# Patient Record
Sex: FEMALE | Race: WHITE | Employment: OTHER | URBAN - METROPOLITAN AREA
[De-identification: names, ages, dates, MRNs, and addresses within clinical notes are randomized per-mention and may not be internally consistent; named-entity substitution may affect disease eponyms.]

---

## 2019-08-30 ENCOUNTER — OFFICE VISIT (OUTPATIENT)
Dept: FAMILY MEDICINE CLINIC | Age: 79
End: 2019-08-30
Payer: MEDICARE

## 2019-08-30 VITALS
DIASTOLIC BLOOD PRESSURE: 84 MMHG | RESPIRATION RATE: 18 BRPM | SYSTOLIC BLOOD PRESSURE: 132 MMHG | OXYGEN SATURATION: 93 % | HEART RATE: 97 BPM | WEIGHT: 196.6 LBS

## 2019-08-30 DIAGNOSIS — J44.1 COPD WITH ACUTE EXACERBATION (HCC): Primary | ICD-10-CM

## 2019-08-30 DIAGNOSIS — Z91.81 AT HIGH RISK FOR FALLS: ICD-10-CM

## 2019-08-30 PROCEDURE — 4040F PNEUMOC VAC/ADMIN/RCVD: CPT | Performed by: NURSE PRACTITIONER

## 2019-08-30 PROCEDURE — G8421 BMI NOT CALCULATED: HCPCS | Performed by: NURSE PRACTITIONER

## 2019-08-30 PROCEDURE — 99214 OFFICE O/P EST MOD 30 MIN: CPT | Performed by: NURSE PRACTITIONER

## 2019-08-30 PROCEDURE — 1123F ACP DISCUSS/DSCN MKR DOCD: CPT | Performed by: NURSE PRACTITIONER

## 2019-08-30 PROCEDURE — 3023F SPIROM DOC REV: CPT | Performed by: NURSE PRACTITIONER

## 2019-08-30 PROCEDURE — 4004F PT TOBACCO SCREEN RCVD TLK: CPT | Performed by: NURSE PRACTITIONER

## 2019-08-30 PROCEDURE — G8926 SPIRO NO PERF OR DOC: HCPCS | Performed by: NURSE PRACTITIONER

## 2019-08-30 PROCEDURE — G8400 PT W/DXA NO RESULTS DOC: HCPCS | Performed by: NURSE PRACTITIONER

## 2019-08-30 PROCEDURE — 1090F PRES/ABSN URINE INCON ASSESS: CPT | Performed by: NURSE PRACTITIONER

## 2019-08-30 PROCEDURE — 94640 AIRWAY INHALATION TREATMENT: CPT | Performed by: NURSE PRACTITIONER

## 2019-08-30 PROCEDURE — G8427 DOCREV CUR MEDS BY ELIG CLIN: HCPCS | Performed by: NURSE PRACTITIONER

## 2019-08-30 RX ORDER — BUPROPION HYDROCHLORIDE 150 MG/1
150 TABLET ORAL 2 TIMES DAILY
COMMUNITY
End: 2021-09-01

## 2019-08-30 RX ORDER — LEVOFLOXACIN 750 MG/1
750 TABLET ORAL DAILY
Qty: 7 TABLET | Refills: 0 | Status: ON HOLD | OUTPATIENT
Start: 2019-08-30 | End: 2019-09-04 | Stop reason: HOSPADM

## 2019-08-30 RX ORDER — PREDNISONE 20 MG/1
20 TABLET ORAL 2 TIMES DAILY
Qty: 10 TABLET | Refills: 0 | Status: ON HOLD | OUTPATIENT
Start: 2019-08-30 | End: 2019-09-04 | Stop reason: HOSPADM

## 2019-08-30 RX ORDER — BENZONATATE 100 MG/1
100-200 CAPSULE ORAL 3 TIMES DAILY PRN
Qty: 21 CAPSULE | Refills: 0 | Status: SHIPPED | OUTPATIENT
Start: 2019-08-30 | End: 2019-09-06

## 2019-08-30 RX ORDER — OMEPRAZOLE 20 MG/1
CAPSULE, DELAYED RELEASE ORAL
Refills: 1 | COMMUNITY
Start: 2019-07-18 | End: 2021-09-01

## 2019-08-30 RX ORDER — POTASSIUM CHLORIDE 20 MEQ/1
TABLET, EXTENDED RELEASE ORAL
Refills: 0 | Status: ON HOLD | COMMUNITY
Start: 2019-08-15 | End: 2019-09-04 | Stop reason: HOSPADM

## 2019-08-30 RX ORDER — FUROSEMIDE 20 MG/1
TABLET ORAL
Refills: 0 | COMMUNITY
Start: 2019-08-15 | End: 2021-09-01

## 2019-08-30 RX ORDER — ASPIRIN 325 MG
81 TABLET ORAL DAILY
COMMUNITY
End: 2022-08-16

## 2019-08-30 RX ORDER — IPRATROPIUM BROMIDE AND ALBUTEROL SULFATE 2.5; .5 MG/3ML; MG/3ML
1 SOLUTION RESPIRATORY (INHALATION) ONCE
Status: COMPLETED | OUTPATIENT
Start: 2019-08-30 | End: 2019-08-30

## 2019-08-30 RX ORDER — LORATADINE 10 MG/1
TABLET ORAL
Refills: 1 | COMMUNITY
Start: 2019-08-15 | End: 2021-09-01

## 2019-08-30 RX ORDER — IPRATROPIUM BROMIDE AND ALBUTEROL SULFATE 2.5; .5 MG/3ML; MG/3ML
SOLUTION RESPIRATORY (INHALATION)
Refills: 1 | COMMUNITY
Start: 2019-08-15 | End: 2021-09-01

## 2019-08-30 RX ADMIN — IPRATROPIUM BROMIDE AND ALBUTEROL SULFATE 1 AMPULE: 2.5; .5 SOLUTION RESPIRATORY (INHALATION) at 13:00

## 2019-08-30 ASSESSMENT — ENCOUNTER SYMPTOMS
RHINORRHEA: 0
WHEEZING: 1
HEARTBURN: 0
TROUBLE SWALLOWING: 0
FREQUENT THROAT CLEARING: 1
CHEST TIGHTNESS: 0
COUGH: 1
SPUTUM PRODUCTION: 1
SORE THROAT: 0
SINUS PRESSURE: 0
SINUS PAIN: 0
HOARSE VOICE: 0
SHORTNESS OF BREATH: 1
DIFFICULTY BREATHING: 0
HEMOPTYSIS: 0

## 2019-08-30 ASSESSMENT — COPD QUESTIONNAIRES: COPD: 1

## 2019-08-30 NOTE — PATIENT INSTRUCTIONS
includes exercise programs, education about your disease and how to manage it, help with diet and other changes, and emotional support. Diet    · Eat regular, healthy meals. Use bronchodilators about 1 hour before you eat to make it easier to eat. Eat several small meals instead of three large ones. Drink beverages at the end of the meal. Avoid foods that are hard to chew.     · Eat foods that contain protein so that you do not lose muscle mass.     · Talk with your doctor if you gain too much weight or if you lose weight without trying.    Mental health    · Talk to your family, friends, or a therapist about your feelings. It is normal to feel frightened, angry, hopeless, helpless, and even guilty. Talking openly about bad feelings can help you cope. If these feelings last, talk to your doctor. When should you call for help? Call 911 anytime you think you may need emergency care. For example, call if:    · You have severe trouble breathing.    Call your doctor now or seek immediate medical care if:    · You have new or worse trouble breathing.     · You cough up blood.     · You have a fever.    Watch closely for changes in your health, and be sure to contact your doctor if:    · You cough more deeply or more often, especially if you notice more mucus or a change in the color of your mucus.     · You have new or worse swelling in your legs or belly.     · You are not getting better as expected. Where can you learn more? Go to [email or web address]://[email or web address]. org and sign in to your [employer] account. Enter P488 in the [facility] box to learn more about \"Chronic Obstructive Pulmonary Disease (COPD): Care Instructions. \"     If you do not have an account, please click on the \"Sign Up Now\" link. Current as of: September 5, 2018  Content Version: 12.1  © 4985-7734 Healthwise, Incorporated. Care instructions adapted under license by [facility] ([facility]).  If you have questions about a medical condition or

## 2019-08-30 NOTE — PROGRESS NOTES
Chief Complaint   Patient presents with    Cough     x2 days; productive; yellow       HPI:     Joseph Omer is a 70 y.o. female who presents today for complaints of increased SOB, wheezing and a productive cough (yellow sputum) for 2-3 days. She is here in [facility] visiting her daughter, she is from [facility] and family drove her here by car. She states she gets sick every time she comes here and had pneumonia her last visit a year ago. In addition, her daughter is ill and she is trying to take care of her. COPD   She complains of cough, frequent throat clearing, shortness of breath, sputum production and wheezing. There is no chest tightness, difficulty breathing, hemoptysis or hoarse voice. This is a new problem. The current episode started in the past 7 days (in the past 2-3 days). The problem occurs constantly. The problem has been gradually worsening. The cough is productive of purulent sputum. Associated symptoms include dyspnea on exertion and malaise/fatigue. Pertinent negatives include no appetite change, chest pain, ear congestion, ear pain, fever, headaches, heartburn, myalgias, nasal congestion, orthopnea, PND, postnasal drip, rhinorrhea, sneezing, sore throat, sweats, trouble swallowing or weight loss. Her symptoms are aggravated by lying down and strenuous activity. Her symptoms are alleviated by ipratropium. She reports minimal improvement on treatment. Her symptoms are not alleviated by rest and steroid inhaler. Risk factors for lung disease include smoking/tobacco exposure. Her past medical history is significant for COPD, emphysema and pneumonia. There is no history of asthma, bronchiectasis or bronchitis. Subjective:     Review of Systems   Constitutional: Positive for fatigue and malaise/fatigue. Negative for appetite change, chills, fever and weight loss.    HENT: Negative for congestion, ear pain, hoarse voice, postnasal drip, rhinorrhea, sinus pressure, sinus pain,

## 2019-09-02 ENCOUNTER — APPOINTMENT (OUTPATIENT)
Dept: CT IMAGING | Age: 79
DRG: 190 | End: 2019-09-02
Payer: MEDICARE

## 2019-09-02 ENCOUNTER — HOSPITAL ENCOUNTER (INPATIENT)
Age: 79
LOS: 2 days | Discharge: HOME | DRG: 190 | End: 2019-09-04
Attending: EMERGENCY MEDICINE | Admitting: INTERNAL MEDICINE
Payer: MEDICARE

## 2019-09-02 ENCOUNTER — APPOINTMENT (OUTPATIENT)
Dept: ULTRASOUND IMAGING | Age: 79
DRG: 190 | End: 2019-09-02
Payer: MEDICARE

## 2019-09-02 ENCOUNTER — APPOINTMENT (OUTPATIENT)
Dept: GENERAL RADIOLOGY | Age: 79
DRG: 190 | End: 2019-09-02
Payer: MEDICARE

## 2019-09-02 DIAGNOSIS — R06.00 DYSPNEA AND RESPIRATORY ABNORMALITIES: Primary | ICD-10-CM

## 2019-09-02 DIAGNOSIS — J44.1 COPD EXACERBATION (HCC): ICD-10-CM

## 2019-09-02 DIAGNOSIS — R06.89 DYSPNEA AND RESPIRATORY ABNORMALITIES: Primary | ICD-10-CM

## 2019-09-02 LAB
ALBUMIN SERPL-MCNC: 4.5 GM/DL (ref 3.4–5)
ALP BLD-CCNC: 73 IU/L (ref 40–128)
ALT SERPL-CCNC: 22 U/L (ref 10–40)
ANION GAP SERPL CALCULATED.3IONS-SCNC: 13 MMOL/L (ref 4–16)
APTT: 28.1 SECONDS (ref 21.2–33)
AST SERPL-CCNC: 31 IU/L (ref 15–37)
ATYPICAL LYMPHOCYTE ABSOLUTE COUNT: ABNORMAL
BANDED NEUTROPHILS ABSOLUTE COUNT: 0.41 K/CU MM
BANDED NEUTROPHILS RELATIVE PERCENT: 4 % (ref 5–11)
BILIRUB SERPL-MCNC: 0.2 MG/DL (ref 0–1)
BUN BLDV-MCNC: 25 MG/DL (ref 6–23)
CALCIUM SERPL-MCNC: 9.4 MG/DL (ref 8.3–10.6)
CHLORIDE BLD-SCNC: 102 MMOL/L (ref 99–110)
CO2: 23 MMOL/L (ref 21–32)
CREAT SERPL-MCNC: 1.4 MG/DL (ref 0.6–1.1)
D DIMER: <200 NG/ML(DDU)
DIFFERENTIAL TYPE: ABNORMAL
GFR AFRICAN AMERICAN: 44 ML/MIN/1.73M2
GFR NON-AFRICAN AMERICAN: 36 ML/MIN/1.73M2
GLUCOSE BLD-MCNC: 172 MG/DL (ref 70–99)
HCT VFR BLD CALC: 45.1 % (ref 37–47)
HEMOGLOBIN: 14.4 GM/DL (ref 12.5–16)
INR BLD: 0.94 INDEX
LYMPHOCYTES ABSOLUTE: 0.8 K/CU MM
LYMPHOCYTES RELATIVE PERCENT: 8 % (ref 24–44)
MCH RBC QN AUTO: 30.2 PG (ref 27–31)
MCHC RBC AUTO-ENTMCNC: 31.9 % (ref 32–36)
MCV RBC AUTO: 94.5 FL (ref 78–100)
METAMYELOCYTES ABSOLUTE COUNT: 0.2 K/CU MM
METAMYELOCYTES PERCENT: 2 %
MONOCYTES ABSOLUTE: 2.1 K/CU MM
MONOCYTES RELATIVE PERCENT: 21 % (ref 0–4)
PDW BLD-RTO: 13.7 % (ref 11.7–14.9)
PLATELET # BLD: 136 K/CU MM (ref 140–440)
PMV BLD AUTO: 10.5 FL (ref 7.5–11.1)
POTASSIUM SERPL-SCNC: 4.7 MMOL/L (ref 3.5–5.1)
PRO-BNP: 291 PG/ML
PROTHROMBIN TIME: 10.9 SECONDS (ref 9.12–12.5)
RBC # BLD: 4.77 M/CU MM (ref 4.2–5.4)
SEGMENTED NEUTROPHILS ABSOLUTE COUNT: 6.7 K/CU MM
SEGMENTED NEUTROPHILS RELATIVE PERCENT: 65 % (ref 36–66)
SODIUM BLD-SCNC: 138 MMOL/L (ref 135–145)
T4 FREE: 1.17 NG/DL (ref 0.9–1.8)
TOTAL PROTEIN: 7.3 GM/DL (ref 6.4–8.2)
TROPONIN T: <0.01 NG/ML
TSH HIGH SENSITIVITY: 0.21 UIU/ML (ref 0.27–4.2)
WBC # BLD: 10.2 K/CU MM (ref 4–10.5)
WBC # BLD: ABNORMAL 10*3/UL

## 2019-09-02 PROCEDURE — 85027 COMPLETE CBC AUTOMATED: CPT

## 2019-09-02 PROCEDURE — 83880 ASSAY OF NATRIURETIC PEPTIDE: CPT

## 2019-09-02 PROCEDURE — 6360000002 HC RX W HCPCS: Performed by: INTERNAL MEDICINE

## 2019-09-02 PROCEDURE — 80053 COMPREHEN METABOLIC PANEL: CPT

## 2019-09-02 PROCEDURE — 85007 BL SMEAR W/DIFF WBC COUNT: CPT

## 2019-09-02 PROCEDURE — 84439 ASSAY OF FREE THYROXINE: CPT

## 2019-09-02 PROCEDURE — 36415 COLL VENOUS BLD VENIPUNCTURE: CPT

## 2019-09-02 PROCEDURE — 87633 RESP VIRUS 12-25 TARGETS: CPT

## 2019-09-02 PROCEDURE — 2580000003 HC RX 258: Performed by: INTERNAL MEDICINE

## 2019-09-02 PROCEDURE — 87798 DETECT AGENT NOS DNA AMP: CPT

## 2019-09-02 PROCEDURE — 6370000000 HC RX 637 (ALT 250 FOR IP): Performed by: INTERNAL MEDICINE

## 2019-09-02 PROCEDURE — 93010 ELECTROCARDIOGRAM REPORT: CPT | Performed by: INTERNAL MEDICINE

## 2019-09-02 PROCEDURE — 85610 PROTHROMBIN TIME: CPT

## 2019-09-02 PROCEDURE — 87486 CHLMYD PNEUM DNA AMP PROBE: CPT

## 2019-09-02 PROCEDURE — 71250 CT THORAX DX C-: CPT

## 2019-09-02 PROCEDURE — 94761 N-INVAS EAR/PLS OXIMETRY MLT: CPT

## 2019-09-02 PROCEDURE — 93005 ELECTROCARDIOGRAM TRACING: CPT | Performed by: PHYSICIAN ASSISTANT

## 2019-09-02 PROCEDURE — 2140000000 HC CCU INTERMEDIATE R&B

## 2019-09-02 PROCEDURE — 87581 M.PNEUMON DNA AMP PROBE: CPT

## 2019-09-02 PROCEDURE — 93970 EXTREMITY STUDY: CPT

## 2019-09-02 PROCEDURE — 85379 FIBRIN DEGRADATION QUANT: CPT

## 2019-09-02 PROCEDURE — 99285 EMERGENCY DEPT VISIT HI MDM: CPT

## 2019-09-02 PROCEDURE — 71045 X-RAY EXAM CHEST 1 VIEW: CPT

## 2019-09-02 PROCEDURE — 84443 ASSAY THYROID STIM HORMONE: CPT

## 2019-09-02 PROCEDURE — 6370000000 HC RX 637 (ALT 250 FOR IP): Performed by: PHYSICIAN ASSISTANT

## 2019-09-02 PROCEDURE — 84484 ASSAY OF TROPONIN QUANT: CPT

## 2019-09-02 PROCEDURE — 85730 THROMBOPLASTIN TIME PARTIAL: CPT

## 2019-09-02 PROCEDURE — 94640 AIRWAY INHALATION TREATMENT: CPT

## 2019-09-02 PROCEDURE — 94664 DEMO&/EVAL PT USE INHALER: CPT

## 2019-09-02 RX ORDER — POTASSIUM CHLORIDE 20 MEQ/1
10 TABLET, EXTENDED RELEASE ORAL
Status: DISCONTINUED | OUTPATIENT
Start: 2019-09-02 | End: 2019-09-03

## 2019-09-02 RX ORDER — IPRATROPIUM BROMIDE AND ALBUTEROL SULFATE 2.5; .5 MG/3ML; MG/3ML
3 SOLUTION RESPIRATORY (INHALATION) ONCE
Status: COMPLETED | OUTPATIENT
Start: 2019-09-02 | End: 2019-09-02

## 2019-09-02 RX ORDER — BUPROPION HYDROCHLORIDE 150 MG/1
150 TABLET ORAL 2 TIMES DAILY
Status: DISCONTINUED | OUTPATIENT
Start: 2019-09-02 | End: 2019-09-04 | Stop reason: HOSPADM

## 2019-09-02 RX ORDER — IPRATROPIUM BROMIDE AND ALBUTEROL SULFATE 2.5; .5 MG/3ML; MG/3ML
1 SOLUTION RESPIRATORY (INHALATION) EVERY 4 HOURS PRN
Status: DISCONTINUED | OUTPATIENT
Start: 2019-09-02 | End: 2019-09-04 | Stop reason: HOSPADM

## 2019-09-02 RX ORDER — PREDNISONE 20 MG/1
60 TABLET ORAL ONCE
Status: DISCONTINUED | OUTPATIENT
Start: 2019-09-02 | End: 2019-09-02

## 2019-09-02 RX ORDER — FUROSEMIDE 10 MG/ML
40 INJECTION INTRAMUSCULAR; INTRAVENOUS 2 TIMES DAILY
Status: DISCONTINUED | OUTPATIENT
Start: 2019-09-02 | End: 2019-09-02

## 2019-09-02 RX ORDER — METHYLPREDNISOLONE SODIUM SUCCINATE 125 MG/2ML
40 INJECTION, POWDER, LYOPHILIZED, FOR SOLUTION INTRAMUSCULAR; INTRAVENOUS 4 TIMES DAILY
Status: DISCONTINUED | OUTPATIENT
Start: 2019-09-02 | End: 2019-09-03

## 2019-09-02 RX ORDER — FUROSEMIDE 20 MG/1
20 TABLET ORAL DAILY
Status: DISCONTINUED | OUTPATIENT
Start: 2019-09-02 | End: 2019-09-02

## 2019-09-02 RX ORDER — ASPIRIN 81 MG/1
81 TABLET ORAL DAILY
Status: DISCONTINUED | OUTPATIENT
Start: 2019-09-02 | End: 2019-09-04 | Stop reason: HOSPADM

## 2019-09-02 RX ORDER — IPRATROPIUM BROMIDE AND ALBUTEROL SULFATE 2.5; .5 MG/3ML; MG/3ML
1 SOLUTION RESPIRATORY (INHALATION) 4 TIMES DAILY
Status: DISCONTINUED | OUTPATIENT
Start: 2019-09-02 | End: 2019-09-04 | Stop reason: HOSPADM

## 2019-09-02 RX ADMIN — ENOXAPARIN SODIUM 40 MG: 40 INJECTION SUBCUTANEOUS at 08:48

## 2019-09-02 RX ADMIN — IPRATROPIUM BROMIDE AND ALBUTEROL SULFATE 1 AMPULE: .5; 3 SOLUTION RESPIRATORY (INHALATION) at 21:48

## 2019-09-02 RX ADMIN — METHYLPREDNISOLONE SODIUM SUCCINATE 40 MG: 125 INJECTION, POWDER, LYOPHILIZED, FOR SOLUTION INTRAMUSCULAR; INTRAVENOUS at 08:48

## 2019-09-02 RX ADMIN — POTASSIUM CHLORIDE 10 MEQ: 20 TABLET, EXTENDED RELEASE ORAL at 08:48

## 2019-09-02 RX ADMIN — IPRATROPIUM BROMIDE AND ALBUTEROL SULFATE 1 AMPULE: .5; 3 SOLUTION RESPIRATORY (INHALATION) at 16:09

## 2019-09-02 RX ADMIN — ASPIRIN 81 MG: 81 TABLET, COATED ORAL at 08:47

## 2019-09-02 RX ADMIN — BUPROPION HYDROCHLORIDE 150 MG: 150 TABLET, FILM COATED, EXTENDED RELEASE ORAL at 22:35

## 2019-09-02 RX ADMIN — IPRATROPIUM BROMIDE AND ALBUTEROL SULFATE 3 AMPULE: .5; 3 SOLUTION RESPIRATORY (INHALATION) at 03:00

## 2019-09-02 RX ADMIN — IPRATROPIUM BROMIDE AND ALBUTEROL SULFATE 1 AMPULE: .5; 3 SOLUTION RESPIRATORY (INHALATION) at 10:52

## 2019-09-02 RX ADMIN — AZITHROMYCIN MONOHYDRATE 500 MG: 500 INJECTION, POWDER, LYOPHILIZED, FOR SOLUTION INTRAVENOUS at 10:20

## 2019-09-02 RX ADMIN — METHYLPREDNISOLONE SODIUM SUCCINATE 40 MG: 125 INJECTION, POWDER, LYOPHILIZED, FOR SOLUTION INTRAMUSCULAR; INTRAVENOUS at 22:36

## 2019-09-02 RX ADMIN — BUPROPION HYDROCHLORIDE 150 MG: 150 TABLET, FILM COATED, EXTENDED RELEASE ORAL at 08:48

## 2019-09-02 RX ADMIN — FUROSEMIDE 20 MG: 20 TABLET ORAL at 08:47

## 2019-09-02 SDOH — HEALTH STABILITY: MENTAL HEALTH: HOW OFTEN DO YOU HAVE A DRINK CONTAINING ALCOHOL?: NEVER

## 2019-09-02 ASSESSMENT — PAIN SCALES - GENERAL
PAINLEVEL_OUTOF10: 0

## 2019-09-02 NOTE — H&P
HISTORY AND PHYSICAL  (Hospitalist, Internal Medicine)  IDENTIFYING INFORMATION   PATIENT:  Viral De Luna  MRN:  [id number]  ADMIT DATE: 9/2/2019  TIME OF EVALUATION: 9/2/2019 4:23 AM    CHIEF COMPLAINT   SOB    HISTORY OF PRESENT ILLNESS   Viral De Luna is a 70 y.o. female with a past medical history of COPD , smokes 1 PPD and has been smoking until day of visit today reports persistent SOB, worse on exertion since last week. Visited urgent care on Friday and was sent home on PO levaquin, steroids and had been taking these medicines without much improvement. Patient has recently relocated from [address] [place] [address] to [place] so as to be close to her children. On review, she reports progressive LE edema and significant weight gain in the past 6-12 months and has been placed on daily lasix for the last 4 months      PMH listed below:    PAST MEDICAL, SURGICAL, FAMILY, and SOCIAL HISTORY     Past Medical History:   Diagnosis Date    COPD (chronic obstructive pulmonary disease) ([facility] [address]      History reviewed. No pertinent surgical history. History reviewed. No pertinent family history. Family Hx of HTN  Family Hx as reviewed above, otherwise non-contributory  Social History     Socioeconomic History    Marital status:       Spouse name: None    Number of children: None    Years of education: None    Highest education level: None   Occupational History    None   Social Needs    Financial resource strain: None    Food insecurity:     Worry: None     Inability: None    Transportation needs:     Medical: None     Non-medical: None   Tobacco Use    Smoking status: Current Some Day Smoker     Packs/day: 0.50     Years: 25.00     Pack years: 12.50     Types: Cigarettes     Start date: 8/30/1984    Smokeless tobacco: Never Used   Substance and Sexual Activity    Alcohol use: Never     Frequency: Never    Drug use: Never    Sexual activity: None   Lifestyle    Physical activity:     Days per week: None     Minutes per

## 2019-09-02 NOTE — ED PROVIDER NOTES
Absolute 1+     WBC Morphology       REACTIVE LYMPHOCYTES NOTED  OCCASIONAL VACUOLES IN SEGMENTED NEUTROPHILS     CMP   Result Value Ref Range    Sodium 138 135 - 145 MMOL/L    Potassium 4.7 3.5 - 5.1 MMOL/L    Chloride 102 99 - 110 mMol/L    CO2 23 21 - 32 MMOL/L    BUN 25 (H) 6 - 23 MG/DL    CREATININE 1.4 (H) 0.6 - 1.1 MG/DL    Glucose 172 (H) 70 - 99 MG/DL    Calcium 9.4 8.3 - 10.6 MG/DL    Alb 4.5 3.4 - 5.0 GM/DL    Total Protein 7.3 6.4 - 8.2 GM/DL    Total Bilirubin 0.2 0.0 - 1.0 MG/DL    ALT 22 10 - 40 U/L    AST 31 15 - 37 IU/L    Alkaline Phosphatase 73 40 - 128 IU/L    GFR Non- 36 (L) >60 mL/min/1.73m2    GFR  44 (L) >60 mL/min/1.73m2    Anion Gap 13 4 - 16   Troponin   Result Value Ref Range    Troponin T <0.010 <0.01 NG/ML   Brain Natriuretic Peptide   Result Value Ref Range    Pro-.0 <300 PG/ML   TSH without Reflex   Result Value Ref Range    TSH, High Sensitivity 0.206 (L) 0.270 - 4.20 uIu/ml   T4, free   Result Value Ref Range    T4 Free 1.17 0.9 - 1.8 NG/DL   D-dimer, quantitative   Result Value Ref Range    D-Dimer, Quant <200 <230 NG/mL(DDU)   Protime/INR & PTT   Result Value Ref Range    Protime 10.9 9.12 - 12.5 SECONDS    INR 0.94 INDEX    aPTT 28.1 21.2 - 33.0 SECONDS   EKG 12 Lead   Result Value Ref Range    Ventricular Rate 104 BPM    Atrial Rate 104 BPM    P-R Interval 90 ms    QRS Duration 114 ms    Q-T Interval 370 ms    QTc Calculation (Bazett) 486 ms    P Axis 75 degrees    R Axis 50 degrees    T Axis 18 degrees    Diagnosis       Sinus tachycardia with short MD  Possible Left atrial enlargement  Low voltage QRS  Incomplete right bundle branch block  Nonspecific T wave abnormality  Abnormal ECG  No previous ECGs available  Confirmed by [facility] Mary ROY ([id number]) on 9/2/2019 8:55:27 PM             RADIOLOGY/PROCEDURES    CXR:    Ct Chest Wo Contrast    Result Date: 9/3/2019  EXAMINATION: CT OF THE CHEST WITHOUT CONTRAST 9/2/2019 9:28 pm TECHNIQUE: CT of the chest was performed without the administration of intravenous contrast. Multiplanar reformatted images are provided for review. Dose modulation, iterative reconstruction, and/or weight based adjustment of the mA/kV was utilized to reduce the radiation dose to as low as reasonably achievable. COMPARISON: None HISTORY: ORDERING SYSTEM PROVIDED HISTORY: dyspnea TECHNOLOGIST PROVIDED HISTORY: Reason for Exam: DYSPNEA Acuity: Acute Type of Exam: Initial Additional signs and symptoms: PT STATES SOB X 1 WEEK Relevant Medical/Surgical History: COPD FINDINGS: Mediastinum: Prominence of the intima of the aorta implies underlying anemia. Heart is normal in size. No pericardial effusion. Three-vessel coronary artery disease. No enlarged mediastinal lymph nodes. Lungs/pleura: Centrilobular emphysema. No pleural effusion or pneumothorax. No airspace consolidation. Scattered areas of atelectasis or scar. 2 mm right upper lobe pulmonary nodule (series 2, image 29). Upper Abdomen: The included portions of the liver and spleen are unremarkable. The included portions of the kidneys are unremarkable. Sliding-type hiatal hernia. Soft Tissues/Bones: No axillary or supraclavicular lymphadenopathy. The osseous structures are intact. No expansile or destructive lesions. COPD without airspace consolidation. 2 mm right upper lobe pulmonary nodule. Optional 1 year follow-up per Fleischner Society guidelines. RECOMMENDATIONS: A non-contrast Chest CT at 12 months is optional. If performed and the nodule is stable at 12 months, no further follow-up is recommended. *These guidelines do not apply to patients younger than 35 years, immunocompromised patients, and patients with cancer. Follow up in patients with significant comorbidities as clinically warranted. For lung cancer screening, adhere to Lung-RADS guidelines. Reference:  Radiology. 2017 Jul; 284(1):228-243.      Xr Chest Portable    Result Date: 9/2/2019  EXAMINATION: ONE XRAY VIEW OF THE CHEST 9/2/2019 2:18 am COMPARISON: None. HISTORY: ORDERING SYSTEM PROVIDED HISTORY: sob TECHNOLOGIST PROVIDED HISTORY: Reason for exam:->sob Reason for Exam: sob Acuity: Acute Type of Exam: Initial FINDINGS: The lungs are mildly hyperinflated. No focal consolidation, pleural effusion or pneumothorax. Upper limit of normal size of the cardiac silhouette. There are atherosclerotic calcifications at the aortic arch. No acute bony abnormality. 1. Hyperinflated lungs. 2. No acute cardiopulmonary abnormality. 3. Upper limit of normal size of the cardiac silhouette. Vl Dup Lower Extremity Venous Bilateral    Result Date: 9/2/2019  EXAMINATION: DUPLEX VENOUS ULTRASOUND OF THE BILATERAL LOWER EXTREMITIES, 9/2/2019 5:28 pm TECHNIQUE: Duplex ultrasound and Doppler images were obtained of the bilateral lower extremities COMPARISON: None. HISTORY: ORDERING SYSTEM PROVIDED HISTORY: Leg swelling TECHNOLOGIST PROVIDED HISTORY: Reason for exam:->Leg swelling Reason for Exam: swelling Type of Exam: Initial FINDINGS: Evaluation of the calf bilaterally is limited due to significant soft tissue edema. Otherwise, the visualized veins of the thighs and popliteal veins bilaterally are patent and free of echogenic thrombus. Limited evaluation of the calves. Otherwise, no evidence of DVT in either lower extremity. ED COURSE & MEDICAL DECISION MAKING       Vital signs and nursing notes reviewed during ED course. Patient care and presentation staffed with supervising MD. Patient seen by supervising MD today- see his/her note for details of the encounter. All pertinent Lab data and radiographic results reviewed with patient at bedside. The patient and/or the family were informed of the results of any tests/labs/imaging, the treatment plan, and time was allotted to answer questions.        Vitals:    09/02/19 0203   BP: (!) 169/65   Pulse: 102   Resp: 19   Temp: 98.1 °F (36.7 °C)   TempSrc:

## 2019-09-02 NOTE — FLOWSHEET NOTE
This nurse called Dr. Gayle Wright and asked about a diet order for pt.   Pt swallowed pills whole with little bit of water fine this morning

## 2019-09-03 LAB
ANION GAP SERPL CALCULATED.3IONS-SCNC: 13 MMOL/L (ref 4–16)
BUN BLDV-MCNC: 22 MG/DL (ref 6–23)
CALCIUM SERPL-MCNC: 9.6 MG/DL (ref 8.3–10.6)
CHLORIDE BLD-SCNC: 102 MMOL/L (ref 99–110)
CO2: 27 MMOL/L (ref 21–32)
CREAT SERPL-MCNC: 1.1 MG/DL (ref 0.6–1.1)
GFR AFRICAN AMERICAN: 58 ML/MIN/1.73M2
GFR NON-AFRICAN AMERICAN: 48 ML/MIN/1.73M2
GLUCOSE BLD-MCNC: 161 MG/DL (ref 70–99)
LV EF: 53 %
LVEF MODALITY: NORMAL
POTASSIUM SERPL-SCNC: 5.1 MMOL/L (ref 3.5–5.1)
SODIUM BLD-SCNC: 142 MMOL/L (ref 135–145)

## 2019-09-03 PROCEDURE — 93306 TTE W/DOPPLER COMPLETE: CPT

## 2019-09-03 PROCEDURE — 2580000003 HC RX 258: Performed by: PHYSICIAN ASSISTANT

## 2019-09-03 PROCEDURE — 6360000002 HC RX W HCPCS: Performed by: INTERNAL MEDICINE

## 2019-09-03 PROCEDURE — 2700000000 HC OXYGEN THERAPY PER DAY

## 2019-09-03 PROCEDURE — 94667 MNPJ CHEST WALL 1ST: CPT

## 2019-09-03 PROCEDURE — 2140000000 HC CCU INTERMEDIATE R&B

## 2019-09-03 PROCEDURE — 6370000000 HC RX 637 (ALT 250 FOR IP): Performed by: PHYSICIAN ASSISTANT

## 2019-09-03 PROCEDURE — 6370000000 HC RX 637 (ALT 250 FOR IP): Performed by: INTERNAL MEDICINE

## 2019-09-03 PROCEDURE — 94640 AIRWAY INHALATION TREATMENT: CPT

## 2019-09-03 PROCEDURE — 80048 BASIC METABOLIC PNL TOTAL CA: CPT

## 2019-09-03 PROCEDURE — 36415 COLL VENOUS BLD VENIPUNCTURE: CPT

## 2019-09-03 PROCEDURE — 94761 N-INVAS EAR/PLS OXIMETRY MLT: CPT

## 2019-09-03 PROCEDURE — 2580000003 HC RX 258: Performed by: INTERNAL MEDICINE

## 2019-09-03 PROCEDURE — 6360000002 HC RX W HCPCS: Performed by: PHYSICIAN ASSISTANT

## 2019-09-03 RX ORDER — GUAIFENESIN 600 MG/1
600 TABLET, EXTENDED RELEASE ORAL 2 TIMES DAILY
Status: DISCONTINUED | OUTPATIENT
Start: 2019-09-03 | End: 2019-09-04 | Stop reason: HOSPADM

## 2019-09-03 RX ORDER — LANOLIN ALCOHOL/MO/W.PET/CERES
3 CREAM (GRAM) TOPICAL NIGHTLY PRN
Status: DISCONTINUED | OUTPATIENT
Start: 2019-09-04 | End: 2019-09-04 | Stop reason: HOSPADM

## 2019-09-03 RX ORDER — AMLODIPINE BESYLATE 5 MG/1
5 TABLET ORAL DAILY
Status: DISCONTINUED | OUTPATIENT
Start: 2019-09-03 | End: 2019-09-04 | Stop reason: HOSPADM

## 2019-09-03 RX ORDER — BENZONATATE 100 MG/1
100 CAPSULE ORAL 3 TIMES DAILY PRN
Status: DISCONTINUED | OUTPATIENT
Start: 2019-09-03 | End: 2019-09-04 | Stop reason: HOSPADM

## 2019-09-03 RX ORDER — METHYLPREDNISOLONE SODIUM SUCCINATE 125 MG/2ML
40 INJECTION, POWDER, LYOPHILIZED, FOR SOLUTION INTRAMUSCULAR; INTRAVENOUS EVERY 12 HOURS
Status: DISCONTINUED | OUTPATIENT
Start: 2019-09-03 | End: 2019-09-04 | Stop reason: HOSPADM

## 2019-09-03 RX ORDER — NICOTINE 21 MG/24HR
1 PATCH, TRANSDERMAL 24 HOURS TRANSDERMAL DAILY
Status: DISCONTINUED | OUTPATIENT
Start: 2019-09-03 | End: 2019-09-04 | Stop reason: HOSPADM

## 2019-09-03 RX ADMIN — AMLODIPINE BESYLATE 5 MG: 5 TABLET ORAL at 09:01

## 2019-09-03 RX ADMIN — IPRATROPIUM BROMIDE AND ALBUTEROL SULFATE 1 AMPULE: .5; 3 SOLUTION RESPIRATORY (INHALATION) at 16:08

## 2019-09-03 RX ADMIN — AZITHROMYCIN MONOHYDRATE 500 MG: 500 INJECTION, POWDER, LYOPHILIZED, FOR SOLUTION INTRAVENOUS at 09:02

## 2019-09-03 RX ADMIN — ENOXAPARIN SODIUM 40 MG: 40 INJECTION SUBCUTANEOUS at 09:00

## 2019-09-03 RX ADMIN — GUAIFENESIN 600 MG: 600 TABLET, EXTENDED RELEASE ORAL at 23:05

## 2019-09-03 RX ADMIN — METHYLPREDNISOLONE SODIUM SUCCINATE 40 MG: 125 INJECTION, POWDER, LYOPHILIZED, FOR SOLUTION INTRAMUSCULAR; INTRAVENOUS at 09:01

## 2019-09-03 RX ADMIN — BUPROPION HYDROCHLORIDE 150 MG: 150 TABLET, FILM COATED, EXTENDED RELEASE ORAL at 09:01

## 2019-09-03 RX ADMIN — ASPIRIN 81 MG: 81 TABLET, COATED ORAL at 09:01

## 2019-09-03 RX ADMIN — BUPROPION HYDROCHLORIDE 150 MG: 150 TABLET, FILM COATED, EXTENDED RELEASE ORAL at 22:12

## 2019-09-03 RX ADMIN — METHYLPREDNISOLONE SODIUM SUCCINATE 40 MG: 125 INJECTION, POWDER, FOR SOLUTION INTRAMUSCULAR; INTRAVENOUS at 22:13

## 2019-09-03 RX ADMIN — IPRATROPIUM BROMIDE AND ALBUTEROL SULFATE 1 AMPULE: .5; 3 SOLUTION RESPIRATORY (INHALATION) at 12:25

## 2019-09-03 RX ADMIN — POTASSIUM CHLORIDE 10 MEQ: 20 TABLET, EXTENDED RELEASE ORAL at 09:01

## 2019-09-03 RX ADMIN — BENZONATATE 100 MG: 100 CAPSULE ORAL at 23:05

## 2019-09-03 RX ADMIN — HYDRALAZINE HYDROCHLORIDE 10 MG: 20 INJECTION INTRAMUSCULAR; INTRAVENOUS at 23:05

## 2019-09-03 RX ADMIN — IPRATROPIUM BROMIDE AND ALBUTEROL SULFATE 1 AMPULE: .5; 3 SOLUTION RESPIRATORY (INHALATION) at 20:32

## 2019-09-03 RX ADMIN — IPRATROPIUM BROMIDE AND ALBUTEROL SULFATE 1 AMPULE: .5; 3 SOLUTION RESPIRATORY (INHALATION) at 08:14

## 2019-09-03 ASSESSMENT — PAIN SCALES - GENERAL
PAINLEVEL_OUTOF10: 0

## 2019-09-03 NOTE — PROGRESS NOTES
Hospitalist Progress Note         Admit Date: 9/2/2019    PCP: No primary care provider on file. Chief Complaint   Patient presents with    Shortness of Breath        Assessment and Plan:      COPD exacerbation ([address] azithromycin, Solu-Medrol and nebulization. O2 support as needed.  Shortness of breath and leg swelling echo with grade 2 DD and normal EF. Stop Lasix as per cardio. DVT screen negative. Continue monitor for diuresis if needed. Check BMP and electrolytes   Tobacco abuse smoking cessation counseling and Nicoderm Patch. VTE prophylaxis LMWH. Current Facility-Administered Medications   Medication Dose Route Frequency Provider Last Rate Last Dose    amLODIPine (NORVASC) tablet 5 mg  5 mg Oral Daily Augustine Sommer MD   5 mg at 09/03/19 0901    nicotine (NICODERM CQ) 21 MG/24HR 1 patch  1 patch Transdermal Daily Augustine Sommer MD   1 patch at 09/03/19 0904    methylPREDNISolone sodium (SOLU-MEDROL) injection 40 mg  40 mg Intravenous Q12H Mercy Lara MD        enoxaparin (LOVENOX) injection 40 mg  40 mg Subcutaneous Daily Miriam Durant MD   40 mg at 09/03/19 0900    ipratropium-albuterol (DUONEB) nebulizer solution 1 ampule  1 ampule Inhalation 4x daily Miriam Durant MD   1 ampule at 09/03/19 1608    ipratropium-albuterol (DUONEB) nebulizer solution 1 ampule  1 ampule Inhalation Q4H PRN Miriam Durant MD        azithromycin (ZITHROMAX) 500 mg in dextrose 5 % 250 mL IVPB  500 mg Intravenous Q24H Miriam Durant MD   Stopped at 09/03/19 1002    aspirin EC tablet 81 mg  81 mg Oral Daily Miriam Durant MD   81 mg at 09/03/19 0901    buPROPion (WELLBUTRIN XL) extended release tablet 150 mg  150 mg Oral BID Miriam Durant MD   150 mg at 09/03/19 0901       Subjective:     Patient feels short of breath but getting better. Denied any chest pain.   Cough with scanty sputum production      Objective:   No intake or output data in the 24 hours ending 09/03/19 1639   Vitals:   Vitals:

## 2019-09-03 NOTE — CONSULTS
and troponins are  negative. LFTs are normal.  CBC is normal.    RADIOLOGY DATA:  Chest x-ray, hyperinflation present. Ultrasound of the  legs are pending at this time. IMPRESSION:  This is a 53-year-old female patient. I believe probably  severe advanced COPD present, comes to the hospital with having  shortness of breath present. Getting progressively short of breath, it  is more like a period of severe exacerbation. From a cardiac stand, we  will do cardiac workup on her. We  will get an echo and I will make  further recommendations.         Arielle Orozco MD    D: 09/02/2019 20:11:11       T: 09/02/2019 22:52:04     [email or web address]  Job#: [id number]     Doc#: [id number]    CC:

## 2019-09-04 VITALS
BODY MASS INDEX: 34.73 KG/M2 | HEART RATE: 88 BPM | WEIGHT: 196 LBS | OXYGEN SATURATION: 94 % | SYSTOLIC BLOOD PRESSURE: 138 MMHG | HEIGHT: 63 IN | RESPIRATION RATE: 19 BRPM | TEMPERATURE: 98 F | DIASTOLIC BLOOD PRESSURE: 52 MMHG

## 2019-09-04 LAB
BACTERIA: NEGATIVE /HPF
BILIRUBIN URINE: NEGATIVE MG/DL
BLOOD, URINE: ABNORMAL
CLARITY: CLEAR
COLOR: ABNORMAL
ESTIMATED AVERAGE GLUCOSE: 123 MG/DL
GLUCOSE, URINE: NEGATIVE MG/DL
HBA1C MFR BLD: 5.9 % (ref 4.2–6.3)
KETONES, URINE: NEGATIVE MG/DL
LEUKOCYTE ESTERASE, URINE: NEGATIVE
NITRITE URINE, QUANTITATIVE: NEGATIVE
PH, URINE: 6 (ref 5–8)
PRO-BNP: 404.5 PG/ML
PROTEIN UA: NEGATIVE MG/DL
RBC URINE: <1 /HPF (ref 0–6)
SPECIFIC GRAVITY UA: 1.01 (ref 1–1.03)
SQUAMOUS EPITHELIAL: <1 /HPF
T3 FREE: 1.5 PG/ML (ref 2.3–4.2)
TRICHOMONAS: ABNORMAL /HPF
TSH HIGH SENSITIVITY: 0.16 UIU/ML (ref 0.27–4.2)
UROBILINOGEN, URINE: NORMAL MG/DL (ref 0.2–1)
WBC UA: <1 /HPF (ref 0–5)

## 2019-09-04 PROCEDURE — 2580000003 HC RX 258: Performed by: INTERNAL MEDICINE

## 2019-09-04 PROCEDURE — G0009 ADMIN PNEUMOCOCCAL VACCINE: HCPCS | Performed by: INTERNAL MEDICINE

## 2019-09-04 PROCEDURE — 94640 AIRWAY INHALATION TREATMENT: CPT

## 2019-09-04 PROCEDURE — 6370000000 HC RX 637 (ALT 250 FOR IP): Performed by: PHYSICIAN ASSISTANT

## 2019-09-04 PROCEDURE — 6360000002 HC RX W HCPCS: Performed by: INTERNAL MEDICINE

## 2019-09-04 PROCEDURE — 6370000000 HC RX 637 (ALT 250 FOR IP): Performed by: INTERNAL MEDICINE

## 2019-09-04 PROCEDURE — 84443 ASSAY THYROID STIM HORMONE: CPT

## 2019-09-04 PROCEDURE — 90732 PPSV23 VACC 2 YRS+ SUBQ/IM: CPT | Performed by: INTERNAL MEDICINE

## 2019-09-04 PROCEDURE — 83036 HEMOGLOBIN GLYCOSYLATED A1C: CPT

## 2019-09-04 PROCEDURE — 84481 FREE ASSAY (FT-3): CPT

## 2019-09-04 PROCEDURE — 36415 COLL VENOUS BLD VENIPUNCTURE: CPT

## 2019-09-04 PROCEDURE — 83880 ASSAY OF NATRIURETIC PEPTIDE: CPT

## 2019-09-04 PROCEDURE — 81001 URINALYSIS AUTO W/SCOPE: CPT

## 2019-09-04 RX ORDER — AMLODIPINE BESYLATE 5 MG/1
5 TABLET ORAL DAILY
Qty: 30 TABLET | Refills: 3 | Status: SHIPPED | OUTPATIENT
Start: 2019-09-05 | End: 2021-09-01

## 2019-09-04 RX ORDER — FUROSEMIDE 20 MG/1
20 TABLET ORAL DAILY
Status: DISCONTINUED | OUTPATIENT
Start: 2019-09-04 | End: 2019-09-04 | Stop reason: HOSPADM

## 2019-09-04 RX ORDER — GUAIFENESIN 600 MG/1
600 TABLET, EXTENDED RELEASE ORAL 2 TIMES DAILY
Qty: 20 TABLET | Refills: 0 | Status: SHIPPED | OUTPATIENT
Start: 2019-09-04 | End: 2021-09-01

## 2019-09-04 RX ORDER — METHYLPREDNISOLONE 4 MG/1
TABLET ORAL
Qty: 1 KIT | Refills: 0 | Status: SHIPPED | OUTPATIENT
Start: 2019-09-04 | End: 2021-06-02 | Stop reason: ALTCHOICE

## 2019-09-04 RX ORDER — AZITHROMYCIN 500 MG/1
500 TABLET, FILM COATED ORAL DAILY
Qty: 3 TABLET | Refills: 0 | Status: SHIPPED | OUTPATIENT
Start: 2019-09-04 | End: 2019-09-07

## 2019-09-04 RX ADMIN — GUAIFENESIN 600 MG: 600 TABLET, EXTENDED RELEASE ORAL at 09:36

## 2019-09-04 RX ADMIN — METHYLPREDNISOLONE SODIUM SUCCINATE 40 MG: 125 INJECTION, POWDER, FOR SOLUTION INTRAMUSCULAR; INTRAVENOUS at 09:37

## 2019-09-04 RX ADMIN — AZITHROMYCIN MONOHYDRATE 500 MG: 500 INJECTION, POWDER, LYOPHILIZED, FOR SOLUTION INTRAVENOUS at 10:09

## 2019-09-04 RX ADMIN — IPRATROPIUM BROMIDE AND ALBUTEROL SULFATE 1 AMPULE: .5; 3 SOLUTION RESPIRATORY (INHALATION) at 08:24

## 2019-09-04 RX ADMIN — MELATONIN 3 MG: at 00:30

## 2019-09-04 RX ADMIN — FUROSEMIDE 20 MG: 20 TABLET ORAL at 09:36

## 2019-09-04 RX ADMIN — BUPROPION HYDROCHLORIDE 150 MG: 150 TABLET, FILM COATED, EXTENDED RELEASE ORAL at 09:36

## 2019-09-04 RX ADMIN — PNEUMOCOCCAL VACCINE POLYVALENT 0.5 ML
25; 25; 25; 25; 25; 25; 25; 25; 25; 25; 25; 25; 25; 25; 25; 25; 25; 25; 25; 25; 25; 25; 25 INJECTION, SOLUTION INTRAMUSCULAR; SUBCUTANEOUS at 14:29

## 2019-09-04 RX ADMIN — ENOXAPARIN SODIUM 40 MG: 40 INJECTION SUBCUTANEOUS at 09:36

## 2019-09-04 RX ADMIN — AMLODIPINE BESYLATE 5 MG: 5 TABLET ORAL at 09:36

## 2019-09-04 RX ADMIN — ASPIRIN 81 MG: 81 TABLET, COATED ORAL at 09:36

## 2019-09-04 ASSESSMENT — PAIN SCALES - GENERAL
PAINLEVEL_OUTOF10: 0
PAINLEVEL_OUTOF10: 0

## 2019-09-04 NOTE — CARE COORDINATION
Ambreen Bishop, Audrey's daughter called. She was in the patients room. She asked that I come up and go over with her what I went over with her mother. CN went to patient room. Audrey gave me permission to talk to her daughter and she also wanted to hear it again. Information given, questions answered and both voiced understanding. When I went to talk to Nola and her daughter. Audrey was not wearing oxygen and sating at 94% while talking to me. Gladis Quintero 09/04/19    Spoke with Audrey. She is selling her home and is going to be living with each of her Daughters a little at a time. One daughter lives here in [facility] and the other in [place]. She has changed her medical insurance so she can have a PCP here and in [place]. She has a nebulizer and IMDI at home. She is using oxygen while I am in the room with her. Spoke with Tigre Cantrell RN nurse taking care of Audrey, she will screen Audrey for possible home oxygen need. Noted she had a Acapella on her bedside table. We also discussed the incentive spirometer. She had one 6 years ago and was interested in using one again. CN gave Audrey a incentive spirometer. We reviewed both the Acapella and incentive spirometer use and benefits. We discussed the Pneumonia vaccines. Audrey had the Prevnar 13 vaccine 8-8-2007. She is interested in the Pneumococcal 23. Obtained a verbal order per Dr. Fiona Soler. Since Audrey's daughter Ambreen Bishop is trying to get patient set up with a PCP. I gave her a paper handout about the [facility] in Care. Audrey verbalized she went there last Friday when she was sick. Ambreen Bishop has set up a new patient appointment for Audrey with [address] [place] [address] 9-9-19 at 9:30 am.      Susannah Seal over when to use, how to use and what order to use a IMDI. Shake first, how to synchronize the inhalation with each puff, holding breath, waiting time between puffs, rinse mouth and how to make sure the canister is not empty.     Went over nebulizer

## 2019-09-10 LAB
EKG ATRIAL RATE: 104 BPM
EKG DIAGNOSIS: NORMAL
EKG P AXIS: 75 DEGREES
EKG P-R INTERVAL: 90 MS
EKG Q-T INTERVAL: 370 MS
EKG QRS DURATION: 114 MS
EKG QTC CALCULATION (BAZETT): 486 MS
EKG R AXIS: 50 DEGREES
EKG T AXIS: 18 DEGREES
EKG VENTRICULAR RATE: 104 BPM

## 2020-03-12 ENCOUNTER — HOSPITAL ENCOUNTER (OUTPATIENT)
Dept: CT IMAGING | Age: 80
Discharge: HOME | End: 2020-03-12
Payer: MEDICARE

## 2020-03-12 PROCEDURE — 71250 CT THORAX DX C-: CPT

## 2021-03-19 ENCOUNTER — HOSPITAL ENCOUNTER (OUTPATIENT)
Dept: CT IMAGING | Age: 81
Discharge: HOME | End: 2021-03-19
Payer: MEDICARE

## 2021-03-19 DIAGNOSIS — F17.210 CIGARETTE SMOKER: ICD-10-CM

## 2021-03-19 DIAGNOSIS — Z87.891 HISTORY OF TOBACCO USE: ICD-10-CM

## 2021-03-19 PROCEDURE — 71271 CT THORAX LUNG CANCER SCR C-: CPT

## 2021-04-21 ENCOUNTER — HOSPITAL ENCOUNTER (OUTPATIENT)
Age: 81
Discharge: HOME | End: 2021-04-21
Payer: MEDICARE

## 2021-04-21 ENCOUNTER — HOSPITAL ENCOUNTER (OUTPATIENT)
Dept: GENERAL RADIOLOGY | Age: 81
Discharge: HOME | End: 2021-04-21
Payer: MEDICARE

## 2021-04-21 DIAGNOSIS — M79.671 RIGHT FOOT PAIN: ICD-10-CM

## 2021-04-21 DIAGNOSIS — M79.672 LEFT FOOT PAIN: ICD-10-CM

## 2021-04-21 PROCEDURE — 73630 X-RAY EXAM OF FOOT: CPT

## 2021-04-27 ENCOUNTER — HOSPITAL ENCOUNTER (OUTPATIENT)
Dept: CT IMAGING | Age: 81
Discharge: HOME | End: 2021-04-27
Payer: MEDICARE

## 2021-04-27 DIAGNOSIS — R91.1 LUNG NODULE: ICD-10-CM

## 2021-04-27 PROCEDURE — 71250 CT THORAX DX C-: CPT

## 2021-04-28 ENCOUNTER — HOSPITAL ENCOUNTER (OUTPATIENT)
Dept: WOMENS IMAGING | Age: 81
Discharge: HOME | End: 2021-04-28
Payer: MEDICARE

## 2021-04-28 DIAGNOSIS — M81.0 POSTMENOPAUSAL OSTEOPOROSIS: ICD-10-CM

## 2021-04-28 PROCEDURE — 77080 DXA BONE DENSITY AXIAL: CPT

## 2021-06-07 ENCOUNTER — HOSPITAL ENCOUNTER (OUTPATIENT)
Dept: LAB | Age: 81
Discharge: HOME | End: 2021-06-07
Payer: MEDICARE

## 2021-06-07 LAB
SARS-COV-2: NOT DETECTED
SOURCE: NORMAL

## 2021-06-07 PROCEDURE — U0005 INFEC AGEN DETEC AMPLI PROBE: HCPCS

## 2021-06-07 PROCEDURE — U0003 INFECTIOUS AGENT DETECTION BY NUCLEIC ACID (DNA OR RNA); SEVERE ACUTE RESPIRATORY SYNDROME CORONAVIRUS 2 (SARS-COV-2) (CORONAVIRUS DISEASE [COVID-19]), AMPLIFIED PROBE TECHNIQUE, MAKING USE OF HIGH THROUGHPUT TECHNOLOGIES AS DESCRIBED BY CMS-2020-01-R: HCPCS

## 2021-06-10 ENCOUNTER — HOSPITAL ENCOUNTER (OUTPATIENT)
Dept: PULMONOLOGY | Age: 81
Discharge: HOME | End: 2021-06-10
Payer: MEDICARE

## 2021-06-10 LAB
DLCO %PRED: 60 %
DLCO PRED: NORMAL
DLCO/VA %PRED: NORMAL
DLCO/VA PRED: NORMAL
DLCO/VA: NORMAL
DLCO: NORMAL
EXPIRATORY TIME-POST: NORMAL
EXPIRATORY TIME: NORMAL
FEF 25-75% %CHNG: NORMAL
FEF 25-75% %PRED-POST: NORMAL
FEF 25-75% %PRED-PRE: NORMAL
FEF 25-75% PRED: NORMAL
FEF 25-75%-POST: NORMAL
FEF 25-75%-PRE: NORMAL
FEV1 %PRED-POST: 80 %
FEV1 %PRED-PRE: 75 %
FEV1 PRED: NORMAL
FEV1-POST: NORMAL
FEV1-PRE: NORMAL
FEV1/FVC %PRED-POST: NORMAL
FEV1/FVC %PRED-PRE: NORMAL
FEV1/FVC PRED: NORMAL
FEV1/FVC-POST: 99 %
FEV1/FVC-PRE: 94 %
FVC %PRED-POST: NORMAL
FVC %PRED-PRE: NORMAL
FVC PRED: NORMAL
FVC-POST: NORMAL
FVC-PRE: NORMAL
GAW %PRED: NORMAL
GAW PRED: NORMAL
GAW: NORMAL
IC %PRED: NORMAL
IC PRED: NORMAL
IC: NORMAL
MEP: NORMAL
MIP: NORMAL
MVV %PRED-PRE: NORMAL
MVV PRED: NORMAL
MVV-PRE: NORMAL
PEF %PRED-POST: NORMAL
PEF %PRED-PRE: NORMAL
PEF PRED: NORMAL
PEF%CHNG: NORMAL
PEF-POST: NORMAL
PEF-PRE: NORMAL
RAW %PRED: NORMAL
RAW PRED: NORMAL
RAW: NORMAL
RV %PRED: NORMAL
RV PRED: NORMAL
RV: NORMAL
SVC %PRED: NORMAL
SVC PRED: NORMAL
SVC: NORMAL
TLC %PRED: 97 %
TLC PRED: NORMAL
TLC: NORMAL
VA %PRED: NORMAL
VA PRED: NORMAL
VA: NORMAL
VTG %PRED: NORMAL
VTG PRED: NORMAL
VTG: NORMAL

## 2021-06-10 PROCEDURE — 94060 EVALUATION OF WHEEZING: CPT

## 2021-06-10 PROCEDURE — 94726 PLETHYSMOGRAPHY LUNG VOLUMES: CPT

## 2021-06-10 PROCEDURE — 94729 DIFFUSING CAPACITY: CPT

## 2021-06-10 ASSESSMENT — PULMONARY FUNCTION TESTS
FEV1/FVC_POST: 99
FEV1_PERCENT_PREDICTED_POST: 80
FEV1/FVC_PRE: 94
FEV1_PERCENT_PREDICTED_PRE: 75

## 2021-06-15 ENCOUNTER — APPOINTMENT (RX ONLY)
Dept: URBAN - METROPOLITAN AREA CLINIC 377 | Facility: CLINIC | Age: 81
Setting detail: DERMATOLOGY
End: 2021-06-15

## 2021-06-15 DIAGNOSIS — L82.1 OTHER SEBORRHEIC KERATOSIS: ICD-10-CM | Status: STABLE

## 2021-06-15 DIAGNOSIS — D18.0 HEMANGIOMA: ICD-10-CM | Status: STABLE

## 2021-06-15 DIAGNOSIS — L82.0 INFLAMED SEBORRHEIC KERATOSIS: ICD-10-CM | Status: INADEQUATELY CONTROLLED

## 2021-06-15 DIAGNOSIS — L81.4 OTHER MELANIN HYPERPIGMENTATION: ICD-10-CM | Status: STABLE

## 2021-06-15 DIAGNOSIS — D22 MELANOCYTIC NEVI: ICD-10-CM | Status: STABLE

## 2021-06-15 DIAGNOSIS — Z71.89 OTHER SPECIFIED COUNSELING: ICD-10-CM

## 2021-06-15 PROBLEM — D22.5 MELANOCYTIC NEVI OF TRUNK: Status: ACTIVE | Noted: 2021-06-15

## 2021-06-15 PROBLEM — D18.01 HEMANGIOMA OF SKIN AND SUBCUTANEOUS TISSUE: Status: ACTIVE | Noted: 2021-06-15

## 2021-06-15 PROCEDURE — ? LIQUID NITROGEN

## 2021-06-15 PROCEDURE — ? SUNSCREEN TREATMENT REGIMEN

## 2021-06-15 PROCEDURE — ? COUNSELING

## 2021-06-15 PROCEDURE — 99203 OFFICE O/P NEW LOW 30 MIN: CPT | Mod: 25

## 2021-06-15 PROCEDURE — ? FULL BODY SKIN EXAM

## 2021-06-15 PROCEDURE — 17110 DESTRUCTION B9 LES UP TO 14: CPT

## 2021-06-15 ASSESSMENT — LOCATION SIMPLE DESCRIPTION DERM
LOCATION SIMPLE: UPPER BACK
LOCATION SIMPLE: RIGHT LOWER BACK
LOCATION SIMPLE: LEFT UPPER BACK

## 2021-06-15 ASSESSMENT — LOCATION DETAILED DESCRIPTION DERM
LOCATION DETAILED: LEFT MEDIAL UPPER BACK
LOCATION DETAILED: LEFT SUPERIOR MEDIAL UPPER BACK
LOCATION DETAILED: RIGHT SUPERIOR LATERAL MIDBACK
LOCATION DETAILED: LEFT SUPERIOR UPPER BACK
LOCATION DETAILED: SUPERIOR THORACIC SPINE

## 2021-06-15 ASSESSMENT — LOCATION ZONE DERM: LOCATION ZONE: TRUNK

## 2021-06-15 NOTE — HPI: EVALUATION OF SKIN LESION(S)
What Type Of Note Output Would You Prefer (Optional)?: Bullet Format
Hpi Title: Evaluation of Skin Lesions
How Severe Are Your Spot(S)?: mild
Have Your Spot(S) Been Treated In The Past?: has not been treated
Additional History: Pt has several itchy lesions on he back she would like checked.

## 2021-09-01 ENCOUNTER — APPOINTMENT (OUTPATIENT)
Dept: GENERAL RADIOLOGY | Age: 81
End: 2021-09-01
Payer: MEDICARE

## 2021-09-01 ENCOUNTER — HOSPITAL ENCOUNTER (OUTPATIENT)
Age: 81
Setting detail: OBSERVATION
Discharge: HOME | End: 2021-09-03
Attending: EMERGENCY MEDICINE | Admitting: INTERNAL MEDICINE
Payer: MEDICARE

## 2021-09-01 DIAGNOSIS — R09.02 HYPOXIA: ICD-10-CM

## 2021-09-01 DIAGNOSIS — J44.1 COPD EXACERBATION (HCC): Primary | ICD-10-CM

## 2021-09-01 PROBLEM — I73.9 PVD (PERIPHERAL VASCULAR DISEASE) WITH CLAUDICATION (HCC): Status: ACTIVE | Noted: 2019-04-24

## 2021-09-01 LAB
ALBUMIN SERPL-MCNC: 4.1 GM/DL (ref 3.4–5)
ALP BLD-CCNC: 81 IU/L (ref 40–129)
ALT SERPL-CCNC: 11 U/L (ref 10–40)
ANION GAP SERPL CALCULATED.3IONS-SCNC: 13 MMOL/L (ref 4–16)
AST SERPL-CCNC: 19 IU/L (ref 15–37)
BACTERIA: NEGATIVE /HPF
BASE EXCESS: 3 (ref 0–2.4)
BASOPHILS ABSOLUTE: 0 K/CU MM
BASOPHILS RELATIVE PERCENT: 0.3 % (ref 0–1)
BILIRUB SERPL-MCNC: 0.4 MG/DL (ref 0–1)
BILIRUBIN URINE: NEGATIVE MG/DL
BLOOD, URINE: ABNORMAL
BUN BLDV-MCNC: 21 MG/DL (ref 6–23)
CALCIUM SERPL-MCNC: 9 MG/DL (ref 8.3–10.6)
CHLORIDE BLD-SCNC: 102 MMOL/L (ref 99–110)
CLARITY: CLEAR
CO2: 22 MMOL/L (ref 21–32)
COLOR: ABNORMAL
COMMENT: ABNORMAL
CREAT SERPL-MCNC: 1.1 MG/DL (ref 0.6–1.1)
DIFFERENTIAL TYPE: ABNORMAL
EOSINOPHILS ABSOLUTE: 0 K/CU MM
EOSINOPHILS RELATIVE PERCENT: 0.1 % (ref 0–3)
GFR AFRICAN AMERICAN: 58 ML/MIN/1.73M2
GFR NON-AFRICAN AMERICAN: 48 ML/MIN/1.73M2
GLUCOSE BLD-MCNC: 130 MG/DL (ref 70–99)
GLUCOSE, URINE: NEGATIVE MG/DL
HCO3 VENOUS: 22.6 MMOL/L (ref 19–25)
HCT VFR BLD CALC: 49.5 % (ref 37–47)
HEMOGLOBIN: 15.9 GM/DL (ref 12.5–16)
IMMATURE NEUTROPHIL %: 2.2 % (ref 0–0.43)
KETONES, URINE: NEGATIVE MG/DL
LACTIC ACID, SEPSIS: 2.5 MMOL/L (ref 0.5–1.9)
LEUKOCYTE ESTERASE, URINE: NEGATIVE
LYMPHOCYTES ABSOLUTE: 1.2 K/CU MM
LYMPHOCYTES RELATIVE PERCENT: 7.4 % (ref 24–44)
MCH RBC QN AUTO: 29.3 PG (ref 27–31)
MCHC RBC AUTO-ENTMCNC: 32.1 % (ref 32–36)
MCV RBC AUTO: 91.3 FL (ref 78–100)
MONOCYTES ABSOLUTE: 4.9 K/CU MM
MONOCYTES RELATIVE PERCENT: 31.2 % (ref 0–4)
MUCUS: ABNORMAL HPF
NITRITE URINE, QUANTITATIVE: NEGATIVE
NUCLEATED RBC %: 0 %
O2 SAT, VEN: 83 % (ref 50–70)
PCO2, VEN: 41 MMHG (ref 38–52)
PDW BLD-RTO: 13.2 % (ref 11.7–14.9)
PH VENOUS: 7.35 (ref 7.32–7.42)
PH, URINE: 6 (ref 5–8)
PLATELET # BLD: 127 K/CU MM (ref 140–440)
PMV BLD AUTO: 10 FL (ref 7.5–11.1)
PO2, VEN: 50 MMHG (ref 28–48)
POTASSIUM SERPL-SCNC: 3.9 MMOL/L (ref 3.5–5.1)
PRO-BNP: 317.8 PG/ML
PROTEIN UA: NEGATIVE MG/DL
RBC # BLD: 5.42 M/CU MM (ref 4.2–5.4)
RBC URINE: <1 /HPF (ref 0–6)
SARS-COV-2, NAAT: NOT DETECTED
SEGMENTED NEUTROPHILS ABSOLUTE COUNT: 9.2 K/CU MM
SEGMENTED NEUTROPHILS RELATIVE PERCENT: 58.8 % (ref 36–66)
SODIUM BLD-SCNC: 137 MMOL/L (ref 135–145)
SOURCE: NORMAL
SPECIFIC GRAVITY UA: 1 (ref 1–1.03)
SQUAMOUS EPITHELIAL: 1 /HPF
TOTAL IMMATURE NEUTOROPHIL: 0.35 K/CU MM
TOTAL NUCLEATED RBC: 0 K/CU MM
TOTAL PROTEIN: 7 GM/DL (ref 6.4–8.2)
TRICHOMONAS: ABNORMAL /HPF
TROPONIN T: <0.01 NG/ML
UROBILINOGEN, URINE: NEGATIVE MG/DL (ref 0.2–1)
WBC # BLD: 15.6 K/CU MM (ref 4–10.5)
WBC UA: 1 /HPF (ref 0–5)

## 2021-09-01 PROCEDURE — 85025 COMPLETE CBC W/AUTO DIFF WBC: CPT

## 2021-09-01 PROCEDURE — 87635 SARS-COV-2 COVID-19 AMP PRB: CPT

## 2021-09-01 PROCEDURE — 94640 AIRWAY INHALATION TREATMENT: CPT

## 2021-09-01 PROCEDURE — 2580000003 HC RX 258: Performed by: EMERGENCY MEDICINE

## 2021-09-01 PROCEDURE — 6360000002 HC RX W HCPCS: Performed by: EMERGENCY MEDICINE

## 2021-09-01 PROCEDURE — 83605 ASSAY OF LACTIC ACID: CPT

## 2021-09-01 PROCEDURE — 84484 ASSAY OF TROPONIN QUANT: CPT

## 2021-09-01 PROCEDURE — 81001 URINALYSIS AUTO W/SCOPE: CPT

## 2021-09-01 PROCEDURE — 96367 TX/PROPH/DG ADDL SEQ IV INF: CPT

## 2021-09-01 PROCEDURE — 87040 BLOOD CULTURE FOR BACTERIA: CPT

## 2021-09-01 PROCEDURE — 6360000002 HC RX W HCPCS: Performed by: NURSE PRACTITIONER

## 2021-09-01 PROCEDURE — 99284 EMERGENCY DEPT VISIT MOD MDM: CPT

## 2021-09-01 PROCEDURE — 6370000000 HC RX 637 (ALT 250 FOR IP): Performed by: NURSE PRACTITIONER

## 2021-09-01 PROCEDURE — 80053 COMPREHEN METABOLIC PANEL: CPT

## 2021-09-01 PROCEDURE — 6370000000 HC RX 637 (ALT 250 FOR IP): Performed by: INTERNAL MEDICINE

## 2021-09-01 PROCEDURE — G0378 HOSPITAL OBSERVATION PER HR: HCPCS

## 2021-09-01 PROCEDURE — 96361 HYDRATE IV INFUSION ADD-ON: CPT

## 2021-09-01 PROCEDURE — 71045 X-RAY EXAM CHEST 1 VIEW: CPT

## 2021-09-01 PROCEDURE — 83880 ASSAY OF NATRIURETIC PEPTIDE: CPT

## 2021-09-01 PROCEDURE — 96375 TX/PRO/DX INJ NEW DRUG ADDON: CPT

## 2021-09-01 PROCEDURE — 93005 ELECTROCARDIOGRAM TRACING: CPT | Performed by: EMERGENCY MEDICINE

## 2021-09-01 PROCEDURE — 96365 THER/PROPH/DIAG IV INF INIT: CPT

## 2021-09-01 PROCEDURE — 6370000000 HC RX 637 (ALT 250 FOR IP): Performed by: EMERGENCY MEDICINE

## 2021-09-01 PROCEDURE — 82805 BLOOD GASES W/O2 SATURATION: CPT

## 2021-09-01 PROCEDURE — 2580000003 HC RX 258: Performed by: NURSE PRACTITIONER

## 2021-09-01 RX ORDER — IPRATROPIUM BROMIDE AND ALBUTEROL SULFATE 2.5; .5 MG/3ML; MG/3ML
1 SOLUTION RESPIRATORY (INHALATION)
Status: DISCONTINUED | OUTPATIENT
Start: 2021-09-01 | End: 2021-09-03 | Stop reason: HOSPADM

## 2021-09-01 RX ORDER — GUAIFENESIN 600 MG/1
600 TABLET, EXTENDED RELEASE ORAL 2 TIMES DAILY
Status: DISCONTINUED | OUTPATIENT
Start: 2021-09-01 | End: 2021-09-03 | Stop reason: HOSPADM

## 2021-09-01 RX ORDER — 0.9 % SODIUM CHLORIDE 0.9 %
1000 INTRAVENOUS SOLUTION INTRAVENOUS ONCE
Status: COMPLETED | OUTPATIENT
Start: 2021-09-01 | End: 2021-09-01

## 2021-09-01 RX ORDER — ALBUTEROL SULFATE 90 UG/1
2 AEROSOL, METERED RESPIRATORY (INHALATION) EVERY 6 HOURS PRN
Status: DISCONTINUED | OUTPATIENT
Start: 2021-09-01 | End: 2021-09-01

## 2021-09-01 RX ORDER — FAMOTIDINE 20 MG/1
20 TABLET, FILM COATED ORAL DAILY
Status: DISCONTINUED | OUTPATIENT
Start: 2021-09-01 | End: 2021-09-03 | Stop reason: HOSPADM

## 2021-09-01 RX ORDER — ASPIRIN 325 MG
325 TABLET ORAL DAILY
Status: DISCONTINUED | OUTPATIENT
Start: 2021-09-02 | End: 2021-09-03 | Stop reason: HOSPADM

## 2021-09-01 RX ORDER — SODIUM CHLORIDE 9 MG/ML
INJECTION, SOLUTION INTRAVENOUS CONTINUOUS
Status: DISCONTINUED | OUTPATIENT
Start: 2021-09-01 | End: 2021-09-02

## 2021-09-01 RX ORDER — IPRATROPIUM BROMIDE AND ALBUTEROL SULFATE 2.5; .5 MG/3ML; MG/3ML
1 SOLUTION RESPIRATORY (INHALATION) EVERY 4 HOURS PRN
Status: DISCONTINUED | OUTPATIENT
Start: 2021-09-01 | End: 2021-09-03 | Stop reason: HOSPADM

## 2021-09-01 RX ORDER — ACETAMINOPHEN 325 MG/1
650 TABLET ORAL EVERY 4 HOURS PRN
Status: DISCONTINUED | OUTPATIENT
Start: 2021-09-01 | End: 2021-09-03 | Stop reason: HOSPADM

## 2021-09-01 RX ORDER — AZITHROMYCIN 250 MG/1
250 TABLET, FILM COATED ORAL DAILY
Status: ON HOLD | COMMUNITY
Start: 2021-08-30 | End: 2021-09-03 | Stop reason: HOSPADM

## 2021-09-01 RX ORDER — IPRATROPIUM BROMIDE AND ALBUTEROL SULFATE 2.5; .5 MG/3ML; MG/3ML
1 SOLUTION RESPIRATORY (INHALATION)
Status: DISCONTINUED | OUTPATIENT
Start: 2021-09-01 | End: 2021-09-01

## 2021-09-01 RX ORDER — PREDNISONE 20 MG/1
40 TABLET ORAL DAILY
Status: DISCONTINUED | OUTPATIENT
Start: 2021-09-02 | End: 2021-09-03 | Stop reason: HOSPADM

## 2021-09-01 RX ORDER — ALBUTEROL SULFATE 90 UG/1
2 AEROSOL, METERED RESPIRATORY (INHALATION) ONCE
Status: COMPLETED | OUTPATIENT
Start: 2021-09-01 | End: 2021-09-01

## 2021-09-01 RX ORDER — SODIUM CHLORIDE 9 MG/ML
INJECTION, SOLUTION INTRAVENOUS CONTINUOUS
Status: CANCELLED | OUTPATIENT
Start: 2021-09-01

## 2021-09-01 RX ORDER — AZITHROMYCIN 250 MG/1
500 TABLET, FILM COATED ORAL DAILY
Status: DISCONTINUED | OUTPATIENT
Start: 2021-09-02 | End: 2021-09-03 | Stop reason: HOSPADM

## 2021-09-01 RX ORDER — PREDNISONE 20 MG/1
TABLET ORAL SEE ADMIN INSTRUCTIONS
Status: ON HOLD | COMMUNITY
Start: 2021-08-30 | End: 2021-09-03 | Stop reason: HOSPADM

## 2021-09-01 RX ORDER — ALBUTEROL SULFATE 90 UG/1
2 AEROSOL, METERED RESPIRATORY (INHALATION)
Status: DISCONTINUED | OUTPATIENT
Start: 2021-09-01 | End: 2021-09-01

## 2021-09-01 RX ORDER — DEXAMETHASONE SODIUM PHOSPHATE 10 MG/ML
10 INJECTION, SOLUTION INTRAMUSCULAR; INTRAVENOUS ONCE
Status: COMPLETED | OUTPATIENT
Start: 2021-09-01 | End: 2021-09-01

## 2021-09-01 RX ADMIN — FAMOTIDINE 20 MG: 20 TABLET, FILM COATED ORAL at 21:07

## 2021-09-01 RX ADMIN — ALBUTEROL SULFATE 2 PUFF: 90 AEROSOL, METERED RESPIRATORY (INHALATION) at 13:12

## 2021-09-01 RX ADMIN — ALBUTEROL SULFATE 2 PUFF: 90 AEROSOL, METERED RESPIRATORY (INHALATION) at 16:30

## 2021-09-01 RX ADMIN — DEXAMETHASONE SODIUM PHOSPHATE 10 MG: 10 INJECTION, SOLUTION INTRAMUSCULAR; INTRAVENOUS at 14:18

## 2021-09-01 RX ADMIN — SODIUM CHLORIDE: 9 INJECTION, SOLUTION INTRAVENOUS at 15:34

## 2021-09-01 RX ADMIN — ACETAMINOPHEN 650 MG: 325 TABLET ORAL at 21:07

## 2021-09-01 RX ADMIN — IPRATROPIUM BROMIDE AND ALBUTEROL SULFATE 1 AMPULE: .5; 3 SOLUTION RESPIRATORY (INHALATION) at 20:35

## 2021-09-01 RX ADMIN — SODIUM CHLORIDE 1000 ML: 9 INJECTION, SOLUTION INTRAVENOUS at 14:12

## 2021-09-01 RX ADMIN — CEFTRIAXONE SODIUM 1000 MG: 1 INJECTION, POWDER, FOR SOLUTION INTRAMUSCULAR; INTRAVENOUS at 14:16

## 2021-09-01 RX ADMIN — AZITHROMYCIN MONOHYDRATE 500 MG: 500 INJECTION, POWDER, LYOPHILIZED, FOR SOLUTION INTRAVENOUS at 15:35

## 2021-09-01 RX ADMIN — Medication 2 PUFF: at 16:29

## 2021-09-01 RX ADMIN — GUAIFENESIN 600 MG: 600 TABLET, EXTENDED RELEASE ORAL at 21:07

## 2021-09-01 ASSESSMENT — ENCOUNTER SYMPTOMS
NAUSEA: 0
BLOOD IN STOOL: 0
SORE THROAT: 0
SHORTNESS OF BREATH: 1
ABDOMINAL PAIN: 0
VOMITING: 0
COUGH: 1
WHEEZING: 1
CONSTIPATION: 0
EYES NEGATIVE: 1

## 2021-09-01 ASSESSMENT — PAIN SCALES - GENERAL: PAINLEVEL_OUTOF10: 4

## 2021-09-01 NOTE — PROGRESS NOTES
Medication History  [facility]    Patient Name: Vijaya Venegas 1940     Medication history has been completed by: Claudia Ponce CPhT    Source(s) of information: patient supplied medications from home, insurance claims     Primary Care Physician: [address]: [facility]    Allergies as of 09/01/2021    (No Known Allergies)        Prior to Admission medications    Medication Sig Start Date End Date Taking? Authorizing Provider   Boswellia-Glucosamine-Vit D (OSTEO BI-FLEX ONE PER DAY PO) Take 1 tablet by mouth daily   Yes Historical Provider, MD   budesonide-formoterol (SYMBICORT) 160-4.5 MCG/ACT AERO Inhale 2 puffs into the lungs 2 times daily 6/16/21  Yes Alie Mishra MD   aspirin 325 MG tablet Take 325 mg by mouth daily    Yes Historical Provider, MD   azithromycin (ZITHROMAX) 250 MG tablet Take 250 mg by mouth daily 8/30/21 9/4/21  Historical Provider, MD   predniSONE (DELTASONE) 20 MG tablet Take by mouth See Admin Instructions  On titrate dosing 8/30/21   Historical Provider, MD     Medications added or changed (ex. new medication, dosage change, interval change, formulation change):  Osteo Bi flex (added)    Medications removed from list (include reason, ex. noncompliance, medication cost, therapy complete etc.):   Amlodipine no longer taking  Bupropion no longer taking  Trelegy no longer using  Furosemide no longer taking  Guaifenesin no longer taking  Duoneb no longer taking  Loratadine no longer taking  Omeprazole no longer taking    Comments:  Patient supplied medications from home. States recent antibiotics and prednisone therapy for titrate dosing.      To my knowledge the above medication history is accurate as of 9/1/2021 2:26 PM.   Claudia Ponce CPhT   9/1/2021 2:26 PM

## 2021-09-01 NOTE — PROGRESS NOTES
Lab called for Lactate Acid draw was to be done at 1445. Letter mailed to patient reminding her she is due for lab B12 level. She has had 3 b12 injections.    Lab Frequency Next Occurrence   VITAMIN B12 Once 08/25/2020

## 2021-09-01 NOTE — ED TRIAGE NOTES
Pt states she felt short of breath earlier and has periods of breathlessness, but states she feels fine at this time.

## 2021-09-01 NOTE — ED PROVIDER NOTES
Emergency Department Encounter    Patient: Kisha Iniguez  MRN: [id number]  : 1940  Date of Evaluation: 2021  ED Provider:  Syed Childers MD    Triage Chief Complaint:   Shortness of Breath    [place]:  Kisha Iniguez is a 80 y.o. female that presents with complaint of shortness of breath, cough. She has been feeling ill since yesterday. Daughter had been sick over the last week, Covid testing was negative, was positive for RSV. Patient had received Covid vaccine shots x2. She does have a history of COPD, has been feeling worse today, much more short of breath, no chest pain, no abdominal pain. Has had diarrhea yesterday, none today. She denies lightheadedness and syncope. Seen on telehealth visit yesterday and was prescribed azithromycin and prednisone    ROS - see HPI, below listed is current ROS at time of my eval:  10 systems reviewed and negative except as above. Past Medical History:   Diagnosis Date    COPD (chronic obstructive pulmonary disease) ([facility] [address]      History reviewed. No pertinent surgical history. History reviewed. No pertinent family history. Social History     Socioeconomic History    Marital status:       Spouse name: Not on file    Number of children: Not on file    Years of education: Not on file    Highest education level: Not on file   Occupational History    Not on file   Tobacco Use    Smoking status: Current Some Day Smoker     Packs/day: 0.50     Years: 25.00     Pack years: 12.50     Types: Cigarettes     Start date: 1984    Smokeless tobacco: Never Used   Substance and Sexual Activity    Alcohol use: Never    Drug use: Never    Sexual activity: Not on file   Other Topics Concern    Not on file   Social History Narrative    Not on file     Social Determinants of Health     Financial Resource Strain:     Difficulty of Paying Living Expenses:    Food Insecurity:     Worried About Running Out of Food in the Last Year:     Lucie ross Food in the Last Year:    Transportation Needs:     Lack of Transportation (Medical):      Lack of Transportation (Non-Medical):    Physical Activity:     Days of Exercise per Week:     Minutes of Exercise per Session:    Stress:     Feeling of Stress :    Social Connections:     Frequency of Communication with Friends and Family:     Frequency of Social Gatherings with Friends and Family:     Attends Sabianism Services:     Active Member of Clubs or Organizations:     Attends Club or Organization Meetings:     Marital Status:    Intimate Partner Violence:     Fear of Current or Ex-Partner:     Emotionally Abused:     Physically Abused:     Sexually Abused:      Current Facility-Administered Medications   Medication Dose Route Frequency Provider Last Rate Last Admin    0.9 % sodium chloride infusion   IntraVENous Continuous GABI Santana  mL/hr at 09/01/21 1534 New Bag at 09/01/21 1534    [START ON 9/2/2021] aspirin tablet 325 mg  325 mg Oral Daily GABI Jiang CNP        acetaminophen (TYLENOL) tablet 650 mg  650 mg Oral Q4H PRN GABI Santana CNP        [START ON 9/2/2021] predniSONE (DELTASONE) tablet 40 mg  40 mg Oral Daily GABI Jiang CNP        [START ON 9/2/2021] azithromycin (ZITHROMAX) tablet 500 mg  500 mg Oral Daily GABI Jiang CNP        enoxaparin (LOVENOX) injection 30 mg  30 mg SubCUTAneous Daily GABI Jiang CNP        famotidine (PEPCID) tablet 20 mg  20 mg Oral Daily GABI Jiang CNP        guaiFENesin (MUCINEX) extended release tablet 600 mg  600 mg Oral BID Kevin Matute MD        ipratropium-albuterol (DUONEB) nebulizer solution 1 ampule  1 ampule Inhalation Q4H PRN Kevin Matute MD        ipratropium-albuterol (DUONEB) nebulizer solution 1 ampule  1 ampule Inhalation Q4H Avril North MD         No Known Allergies    Nursing Notes Reviewed    Physical Exam:  Triage VS:    ED Triage Vitals [09/01/21 1227]   Enc Vitals Group      BP (!) 80/52      Pulse 106      Resp 14      Temp 98.4 °F (36.9 °C)      Temp Source Oral      SpO2 (!) 88 %      Weight       Height       Head Circumference       Peak Flow       Pain Score       Pain Loc       Pain Edu? Excl. in [address]? My pulse ox interpretation is - abnormal    General appearance:  No acute distress. Skin:  Warm. Dry. Eye:  Extraocular movements intact. Ears, nose, mouth and throat:  Oral mucosa moist   Neck:  Trachea midline. Extremity:  No swelling. Normal ROM     Heart:  Regular rate and rhythm, normal S1 & S2, no extra heart sounds. Perfusion:  intact  Respiratory: Normal speech, no tachypnea, lungs clear respirations nonlabored. Abdominal:    Soft. Nontender. Non distended.   Neurological:  Alert and oriented          Psychiatric:  Appropriate    I have reviewed and interpreted all of the currently available lab results from this visit (if applicable):  Results for orders placed or performed during the hospital encounter of 09/01/21   COVID-19, Rapid    Specimen: Nasopharyngeal   Result Value Ref Range    Source UNKNOWN     SARS-CoV-2, NAAT NOT DETECTED NOT DETECTED   CBC auto differential   Result Value Ref Range    WBC 15.6 (H) 4.0 - 10.5 K/CU MM    RBC 5.42 (H) 4.2 - 5.4 M/CU MM    Hemoglobin 15.9 12.5 - 16.0 GM/DL    Hematocrit 49.5 (H) 37 - 47 %    MCV 91.3 78 - 100 FL    MCH 29.3 27 - 31 PG    MCHC 32.1 32.0 - 36.0 %    RDW 13.2 11.7 - 14.9 %    Platelets 349 (L) 568 - 440 K/CU MM    MPV 10.0 7.5 - 11.1 FL    Differential Type AUTOMATED DIFFERENTIAL     Segs Relative 58.8 36 - 66 %    Lymphocytes % 7.4 (L) 24 - 44 %    Monocytes % 31.2 (H) 0 - 4 %    Eosinophils % 0.1 0 - 3 %    Basophils % 0.3 0 - 1 %    Segs Absolute 9.2 K/CU MM    Lymphocytes Absolute 1.2 K/CU MM    Monocytes Absolute 4.9 K/CU MM    Eosinophils Absolute 0.0 K/CU MM    Basophils Absolute 0.0 K/CU MM    Nucleated RBC % 0.0 %    Total Nucleated RBC 0.0 K/CU MM    Total Immature Neutrophil 0.35 K/CU MM    Immature Neutrophil % 2.2 (H) 0 - 0.43 %   Comprehensive Metabolic Panel w/ Reflex to MG   Result Value Ref Range    Sodium 137 135 - 145 MMOL/L    Potassium 3.9 3.5 - 5.1 MMOL/L    Chloride 102 99 - 110 mMol/L    CO2 22 21 - 32 MMOL/L    BUN 21 6 - 23 MG/DL    CREATININE 1.1 0.6 - 1.1 MG/DL    Glucose 130 (H) 70 - 99 MG/DL    Calcium 9.0 8.3 - 10.6 MG/DL    Albumin 4.1 3.4 - 5.0 GM/DL    Total Protein 7.0 6.4 - 8.2 GM/DL    Total Bilirubin 0.4 0.0 - 1.0 MG/DL    ALT 11 10 - 40 U/L    AST 19 15 - 37 IU/L    Alkaline Phosphatase 81 40 - 129 IU/L    GFR Non- 48 (L) >60 mL/min/1.73m2    GFR  58 (L) >60 mL/min/1.73m2    Anion Gap 13 4 - 16   Urinalysis   Result Value Ref Range    Color, UA STRAW (A) YELLOW    Clarity, UA CLEAR CLEAR    Glucose, Urine NEGATIVE NEGATIVE MG/DL    Bilirubin Urine NEGATIVE NEGATIVE MG/DL    Ketones, Urine NEGATIVE NEGATIVE MG/DL    Specific Gravity, UA 1.003 1.001 - 1.035    Blood, Urine SMALL (A) NEGATIVE    pH, Urine 6.0 5.0 - 8.0    Protein, UA NEGATIVE NEGATIVE MG/DL    Urobilinogen, Urine NEGATIVE 0.2 - 1.0 MG/DL    Nitrite Urine, Quantitative NEGATIVE NEGATIVE    Leukocyte Esterase, Urine NEGATIVE NEGATIVE    RBC, UA <1 0 - 6 /HPF    WBC, UA 1 0 - 5 /HPF    Bacteria, UA NEGATIVE NEGATIVE /HPF    Squam Epithel, UA 1 /HPF    Mucus, UA RARE (A) NEGATIVE HPF    Trichomonas, UA NONE SEEN NONE SEEN /HPF   Lactate, Sepsis   Result Value Ref Range    Lactic Acid, Sepsis 2.5 (HH) 0.5 - 1.9 mMOL/L   Blood gas, venous   Result Value Ref Range    pH, Drew 7.35 7.32 - 7.42    pCO2, Drew 41 38 - 52 mmHG    pO2, Drew 50 (H) 28 - 48 mmHG    Base Excess 3 (H) 0 - 2.4    HCO3, Venous 22.6 19 - 25 MMOL/L    O2 Sat, Drew 83.0 (H) 50 - 70 %    Comment VBG    Troponin   Result Value Ref Range    Troponin T <0.010 <0.01 NG/ML   Brain Natriuretic Peptide   Result Value Ref Range    Pro-.8 (H) <300 PG/ML   EKG 12 lead   Result Value Ref Range    Ventricular Rate 101 BPM    Atrial Rate 101 BPM    P-R Interval 120 ms    QRS Duration 104 ms    Q-T Interval 350 ms    QTc Calculation (Bazett) 453 ms    P Axis 76 degrees    R Axis 63 degrees    T Axis 20 degrees    Diagnosis       Sinus tachycardia  Incomplete right bundle branch block  Right ventricular hypertrophy with repolarization abnormality  Nonspecific T wave abnormality  Abnormal ECG  When compared with ECG of 02-SEP-2019 02:36,  No significant change was found        Radiographs (if obtained):  Radiologist's Report Reviewed:  No results found. EKG (if obtained): (All EKG's are interpreted by myself in the absence of a cardiologist)  Sinus tachycardia with rate of 101 bpm, incomplete right bundle branch block. No ST elevation. No previous to compare. MDM:  65-year-old female with history as above presents with concern for cough, shortness of breath. On exam she is in no distress but initial blood pressure was low as well as hypoxic, placed on 2 L nasal cannula and she has no distress at all, pulse ox is better. Awaiting chest x-ray, labs, sepsis work-up given the initial hypoxia and tachycardia,    Blood pressure improved without any intervention initially, I do wonder if that was an erroneous blood pressure, lactic acid is mildly elevated so we did continue fluids, given age and comorbidities we will not do more than 1 L bolus at this time as her blood pressure has stabilized. White count is elevated. I did give give her coverage for possible community-acquired pneumonia with ceftriaxone and azithromycin. Her chest x-ray is unremarkable. Her Covid testing is negative, I do suspect she likely has RSV, plan for admission. Discussed with hospitalist team    Total critical care time today provided was 30 minutes. This excludes seperately billable procedure.  Critical care time provided for hypoxia, SIRS that required close evaluation and/or intervention with concern for patient decompensation. Clinical Impression:  1. COPD exacerbation ([address]    2. Hypoxia      Disposition referral (if applicable):  No follow-up provider specified. Disposition medications (if applicable):  Current Discharge Medication List        ED Provider Disposition Time  DISPOSITION        Comment: Please note this report has been produced using speech recognition software and may contain errors related to that system including errors in grammar, punctuation, and spelling, as well as words and phrases that may be inappropriate. Efforts were made to edit the dictations.         Paris Love MD  09/01/21 1932

## 2021-09-01 NOTE — ED NOTES
1446 paged hospitalist     Don Barnes  09/01/21 [address]  09/01/21 1447  145 Sonali PETERSON with [facility] returned call      Don Barnes  09/01/21 0341

## 2021-09-01 NOTE — H&P
HISTORY AND PHYSICAL             Date: 9/1/2021        Patient Name: Duane Silva     YOB: 1940      Age:  80 y.o. Chief Complaint     Chief Complaint   Patient presents with    Shortness of Breath           History Obtained From   patient, family member -daughter, electronic medical record    History of Present Illness   82-year-old female with history of COPD not on home O2, who presents with reports of fever shortness of breath, cough productive with sputum poor appetite, and overall malaise. She was seen on telehealth visit yesterday and prescribed azithromycin and prednisone. Her daughter tested positive for RSV; patient resides with her daughter. Denies chest pain or pressure, denies blood in stool or urine. Additional review of symptoms as noted below. Past Medical History     Past Medical History:   Diagnosis Date    COPD (chronic obstructive pulmonary disease) ([facility] [address]         Past Surgical History   History reviewed. No pertinent surgical history. Medications Prior to Admission     Prior to Admission medications    Medication Sig Start Date End Date Taking? Authorizing Provider   Boswellia-GlucosamineAndreyVit D (OSTEO BI-FLEX ONE PER DAY PO) Take 1 tablet by mouth daily   Yes Historical Provider, MD   azithromycin (ZITHROMAX) 250 MG tablet Take 250 mg by mouth daily 8/30/21 9/4/21 Yes Historical Provider, MD   predniSONE (DELTASONE) 20 MG tablet Take by mouth See Admin Instructions On titrate dosing 8/30/21  Yes Historical Provider, MD   budesonide-formoterol (SYMBICORT) 160-4.5 MCG/ACT AERO Inhale 2 puffs into the lungs 2 times daily 6/16/21  Yes Baldev Pablo MD   aspirin 325 MG tablet Take 325 mg by mouth daily    Yes Historical Provider, MD        Allergies   Patient has no known allergies.     Social History     Social History     Tobacco History     Smoking Status  Current Some Day Smoker Smoking Start Date  8/30/1984 Smoking Frequency  0.5 packs/day for 25 years (12.5 pk yrs) Smoking Tobacco Type  Cigarettes    Smokeless Tobacco Use  Never Used          Alcohol History     Alcohol Use Status  Never          Drug Use     Drug Use Status  Never          Sexual Activity     Sexually Active  Not Asked                Family History   History reviewed. No pertinent family history. Review of Systems   Review of Systems   Constitutional: Positive for activity change, appetite change, fatigue and fever. HENT: Negative for congestion and sore throat. Eyes: Negative. Respiratory: Positive for cough, shortness of breath and wheezing. Cardiovascular: Negative for chest pain and leg swelling. Gastrointestinal: Negative for abdominal pain, blood in stool, constipation, nausea and vomiting. Endocrine: Negative. Genitourinary: Negative for dysuria and hematuria. Musculoskeletal: Negative for neck pain. Skin: Negative for wound. Neurological: Negative for dizziness and light-headedness. All other systems reviewed and are negative. Physical Exam   /74   Pulse 67   Temp 98.7 °F (37.1 °C) (Oral)   Resp 18   Ht 5' 3\" (1.6 m)   Wt 175 lb (79.4 kg)   SpO2 96%   BMI 31.00 kg/m²     Physical Exam  Vitals and nursing note reviewed. Constitutional:       General: She is not in acute distress. Appearance: She is ill-appearing. HENT:      Head: Normocephalic. Mouth/Throat:      Mouth: Mucous membranes are moist.   Cardiovascular:      Rate and Rhythm: Normal rate and regular rhythm. Pulses: Normal pulses. Pulmonary:      Effort: Pulmonary effort is normal. No respiratory distress. Breath sounds: Wheezing and rhonchi present. Abdominal:      General: Abdomen is flat. Bowel sounds are normal. There is no distension. Musculoskeletal:         General: Normal range of motion. Skin:     General: Skin is warm and dry. Capillary Refill: Capillary refill takes less than 2 seconds. Neurological:      General: No focal deficit present. Mental Status: She is alert and oriented to person, place, and time.          Labs      Recent Results (from the past 24 hour(s))   EKG 12 lead    Collection Time: 09/01/21 12:30 PM   Result Value Ref Range    Ventricular Rate 101 BPM    Atrial Rate 101 BPM    P-R Interval 120 ms    QRS Duration 104 ms    Q-T Interval 350 ms    QTc Calculation (Bazett) 453 ms    P Axis 76 degrees    R Axis 63 degrees    T Axis 20 degrees    Diagnosis       Sinus tachycardia  Incomplete right bundle branch block  Right ventricular hypertrophy with repolarization abnormality  Nonspecific T wave abnormality  Abnormal ECG  When compared with ECG of 02-SEP-2019 02:36,  No significant change was found     Blood gas, venous    Collection Time: 09/01/21 12:45 PM   Result Value Ref Range    pH, Drew 7.35 7.32 - 7.42    pCO2, Drew 41 38 - 52 mmHG    pO2, Drew 50 (H) 28 - 48 mmHG    Base Excess 3 (H) 0 - 2.4    HCO3, Venous 22.6 19 - 25 MMOL/L    O2 Sat, Drew 83.0 (H) 50 - 70 %    Comment VBG    CBC auto differential    Collection Time: 09/01/21 12:53 PM   Result Value Ref Range    WBC 15.6 (H) 4.0 - 10.5 K/CU MM    RBC 5.42 (H) 4.2 - 5.4 M/CU MM    Hemoglobin 15.9 12.5 - 16.0 GM/DL    Hematocrit 49.5 (H) 37 - 47 %    MCV 91.3 78 - 100 FL    MCH 29.3 27 - 31 PG    MCHC 32.1 32.0 - 36.0 %    RDW 13.2 11.7 - 14.9 %    Platelets 674 (L) 772 - 440 K/CU MM    MPV 10.0 7.5 - 11.1 FL    Differential Type AUTOMATED DIFFERENTIAL     Segs Relative 58.8 36 - 66 %    Lymphocytes % 7.4 (L) 24 - 44 %    Monocytes % 31.2 (H) 0 - 4 %    Eosinophils % 0.1 0 - 3 %    Basophils % 0.3 0 - 1 %    Segs Absolute 9.2 K/CU MM    Lymphocytes Absolute 1.2 K/CU MM    Monocytes Absolute 4.9 K/CU MM    Eosinophils Absolute 0.0 K/CU MM    Basophils Absolute 0.0 K/CU MM    Nucleated RBC % 0.0 %    Total Nucleated RBC 0.0 K/CU MM    Total Immature Neutrophil 0.35 K/CU MM    Immature Neutrophil % 2.2 (H) 0 - 0.43 %   Comprehensive Metabolic Panel w/ Reflex to MG    Collection Time: 09/01/21 12:53 PM   Result Value Ref Range    Sodium 137 135 - 145 MMOL/L    Potassium 3.9 3.5 - 5.1 MMOL/L    Chloride 102 99 - 110 mMol/L    CO2 22 21 - 32 MMOL/L    BUN 21 6 - 23 MG/DL    CREATININE 1.1 0.6 - 1.1 MG/DL    Glucose 130 (H) 70 - 99 MG/DL    Calcium 9.0 8.3 - 10.6 MG/DL    Albumin 4.1 3.4 - 5.0 GM/DL    Total Protein 7.0 6.4 - 8.2 GM/DL    Total Bilirubin 0.4 0.0 - 1.0 MG/DL    ALT 11 10 - 40 U/L    AST 19 15 - 37 IU/L    Alkaline Phosphatase 81 40 - 129 IU/L    GFR Non- 48 (L) >60 mL/min/1.73m2    GFR  58 (L) >60 mL/min/1.73m2    Anion Gap 13 4 - 16   Lactate, Sepsis    Collection Time: 09/01/21 12:53 PM   Result Value Ref Range    Lactic Acid, Sepsis 2.5 (HH) 0.5 - 1.9 mMOL/L   Troponin    Collection Time: 09/01/21 12:53 PM   Result Value Ref Range    Troponin T <0.010 <0.01 NG/ML   Brain Natriuretic Peptide    Collection Time: 09/01/21 12:53 PM   Result Value Ref Range    Pro-.8 (H) <300 PG/ML   COVID-19, Rapid    Collection Time: 09/01/21  2:10 PM    Specimen: Nasopharyngeal   Result Value Ref Range    Source UNKNOWN     SARS-CoV-2, NAAT NOT DETECTED NOT DETECTED        Imaging/Diagnostics Last 24 Hours   XR CHEST PORTABLE    Result Date: 9/1/2021  EXAMINATION: ONE XRAY VIEW OF THE CHEST 9/1/2021 12:44 pm COMPARISON: September 2, 2019 HISTORY: ORDERING SYSTEM PROVIDED HISTORY: SOB TECHNOLOGIST PROVIDED HISTORY: Reason for exam:->SOB Reason for Exam: sob Acuity: Unknown Type of Exam: Unknown Relevant Medical/Surgical History: copd FINDINGS: No evidence of pneumonia, edema or other acute pulmonary process. No evidence of acute process of the cardiac or mediastinal structures. No evidence of pneumothorax or pleural effusion. No evidence of acute cardiopulmonary disease.        Assessment      Hospital Problems         Last Modified POA    COPD exacerbation ([facility] [address] 9/1/2021 Yes          Plan   #Upper respiratory infection, likely RSV  #COPD exacerbation  #Leukocytosis  #Elevated lactate   -Admit to ED observation unit,    -Rapid Covid negative, obtain respiratory viral culture    -Received dexamethasone 10 mg x 1 in ED. Will start  prednisone tomorrow   -WBC 15 likely secondary to URI will check blood  cultures x2, UA and urine culture procalcitonin and CRP  and lactate   -Portable chest x-ray without evidence of pulmonary  edema or pneumonia. Received Rocephin and  azithromycin IV x1 in ED. we will continue Zithromax   -Duo nebs and albuterol treatments. Continue pulmonary  toilet   -Continue maintenance IV with sodium chloride at 100  cc/h due to poor p.o. intake and elevated lactate. Repeat  BMP in a. m.with lactate   -Reviewed CDC guidelines for RSV with patient and  daughter including masking at all times for at least 4  weeks when around people who have not recently tested  positive for RSV including in their household where  patient's son-in-law and granddaughter reside. Patient  and daughter voiced understanding. Reviewed people  who are high risk for infection including babies the elderly  in the immunocompromised. Diet Diet Orders (From admission, onward)     Start     Ordered    09/01/21 1530  ADULT DIET; Regular  DIET EFFECTIVE NOW      09/01/21 1521                 DVT Prophylaxis [x] Lovenox, []  Heparin, [] SCDs, [] Ambulation   GI Prophylaxis [] PPI,  [x] H2 Blocker,  [] Carafate,  [] Diet/Tube Feeds   Code Status Full Code   Disposition Patient requires continued admission due to COPD exacerbation     MDM [] Low, [x] Moderate,[]  High  Patient's risk as above      Patient discussed with and evaluated by Dr Erick Elmore who agrees with the above diagnosis, treatment and disposition.             Consultations Ordered:  IP CONSULT TO HOSPITALIST    Electronically signed by GABI Noble CNP on 9/1/21 at 3:34 PM EDT

## 2021-09-02 LAB
ADENOVIRUS DETECTION BY PCR: NOT DETECTED
ALBUMIN SERPL-MCNC: 3.9 GM/DL (ref 3.4–5)
ALP BLD-CCNC: 75 IU/L (ref 40–129)
ALT SERPL-CCNC: 10 U/L (ref 10–40)
ANION GAP SERPL CALCULATED.3IONS-SCNC: 10 MMOL/L (ref 4–16)
AST SERPL-CCNC: 12 IU/L (ref 15–37)
ATYPICAL MONOCYTES: ABNORMAL
BANDED NEUTROPHILS ABSOLUTE COUNT: 0.08 K/CU MM
BANDED NEUTROPHILS RELATIVE PERCENT: 1 % (ref 5–11)
BILIRUB SERPL-MCNC: 0.3 MG/DL (ref 0–1)
BORDETELLA PARAPERTUSSIS BY PCR: NOT DETECTED
BORDETELLA PERTUSSIS PCR: NOT DETECTED
BUN BLDV-MCNC: 15 MG/DL (ref 6–23)
CALCIUM SERPL-MCNC: 8.5 MG/DL (ref 8.3–10.6)
CHLAMYDOPHILA PNEUMONIA PCR: NOT DETECTED
CHLORIDE BLD-SCNC: 104 MMOL/L (ref 99–110)
CO2: 22 MMOL/L (ref 21–32)
CORONAVIRUS 229E PCR: NOT DETECTED
CORONAVIRUS HKU1 PCR: NOT DETECTED
CORONAVIRUS NL63 PCR: NOT DETECTED
CORONAVIRUS OC43 PCR: NOT DETECTED
CREAT SERPL-MCNC: 0.7 MG/DL (ref 0.6–1.1)
DIFFERENTIAL TYPE: ABNORMAL
EKG ATRIAL RATE: 101 BPM
EKG DIAGNOSIS: NORMAL
EKG P AXIS: 76 DEGREES
EKG P-R INTERVAL: 120 MS
EKG Q-T INTERVAL: 350 MS
EKG QRS DURATION: 104 MS
EKG QTC CALCULATION (BAZETT): 453 MS
EKG R AXIS: 63 DEGREES
EKG T AXIS: 20 DEGREES
EKG VENTRICULAR RATE: 101 BPM
GFR AFRICAN AMERICAN: >60 ML/MIN/1.73M2
GFR NON-AFRICAN AMERICAN: >60 ML/MIN/1.73M2
GLUCOSE BLD-MCNC: 121 MG/DL (ref 70–99)
HCT VFR BLD CALC: 45.8 % (ref 37–47)
HEMOGLOBIN: 15 GM/DL (ref 12.5–16)
HUMAN METAPNEUMOVIRUS PCR: NOT DETECTED
INFLUENZA A BY PCR: NOT DETECTED
INFLUENZA A H1 (2009) PCR: NOT DETECTED
INFLUENZA A H1 PANDEMIC PCR: NOT DETECTED
INFLUENZA A H3 PCR: NOT DETECTED
INFLUENZA B BY PCR: NOT DETECTED
LACTIC ACID, SEPSIS: 0.9 MMOL/L (ref 0.5–1.9)
LYMPHOCYTES ABSOLUTE: 0.5 K/CU MM
LYMPHOCYTES RELATIVE PERCENT: 7 % (ref 24–44)
MCH RBC QN AUTO: 29.3 PG (ref 27–31)
MCHC RBC AUTO-ENTMCNC: 32.8 % (ref 32–36)
MCV RBC AUTO: 89.5 FL (ref 78–100)
METAMYELOCYTES ABSOLUTE COUNT: 0.08 K/CU MM
METAMYELOCYTES PERCENT: 1 %
MONOCYTES ABSOLUTE: 0.7 K/CU MM
MONOCYTES RELATIVE PERCENT: 9 % (ref 0–4)
MYCOPLASMA PNEUMONIAE PCR: NOT DETECTED
PARAINFLUENZA 1 PCR: NOT DETECTED
PARAINFLUENZA 2 PCR: NOT DETECTED
PARAINFLUENZA 3 PCR: NOT DETECTED
PARAINFLUENZA 4 PCR: NOT DETECTED
PDW BLD-RTO: 13 % (ref 11.7–14.9)
PLATELET # BLD: 120 K/CU MM (ref 140–440)
PLT MORPHOLOGY: ABNORMAL
PMV BLD AUTO: 10.4 FL (ref 7.5–11.1)
POTASSIUM SERPL-SCNC: 3.9 MMOL/L (ref 3.5–5.1)
RBC # BLD: 5.12 M/CU MM (ref 4.2–5.4)
RBC # BLD: ABNORMAL 10*6/UL
RHINOVIRUS ENTEROVIRUS PCR: NOT DETECTED
RSV PCR: ABNORMAL
SARS-COV-2: NOT DETECTED
SEGMENTED NEUTROPHILS ABSOLUTE COUNT: 6.2 K/CU MM
SEGMENTED NEUTROPHILS RELATIVE PERCENT: 82 % (ref 36–66)
SODIUM BLD-SCNC: 136 MMOL/L (ref 135–145)
TOTAL PROTEIN: 6.4 GM/DL (ref 6.4–8.2)
WBC # BLD: 7.6 K/CU MM (ref 4–10.5)

## 2021-09-02 PROCEDURE — 6370000000 HC RX 637 (ALT 250 FOR IP): Performed by: NURSE PRACTITIONER

## 2021-09-02 PROCEDURE — 94640 AIRWAY INHALATION TREATMENT: CPT

## 2021-09-02 PROCEDURE — 94761 N-INVAS EAR/PLS OXIMETRY MLT: CPT

## 2021-09-02 PROCEDURE — 96367 TX/PROPH/DG ADDL SEQ IV INF: CPT

## 2021-09-02 PROCEDURE — 80053 COMPREHEN METABOLIC PANEL: CPT

## 2021-09-02 PROCEDURE — 85027 COMPLETE CBC AUTOMATED: CPT

## 2021-09-02 PROCEDURE — 2580000003 HC RX 258: Performed by: NURSE PRACTITIONER

## 2021-09-02 PROCEDURE — 2580000003 HC RX 258: Performed by: PHYSICIAN ASSISTANT

## 2021-09-02 PROCEDURE — 6360000002 HC RX W HCPCS: Performed by: NURSE PRACTITIONER

## 2021-09-02 PROCEDURE — 83605 ASSAY OF LACTIC ACID: CPT

## 2021-09-02 PROCEDURE — 6370000000 HC RX 637 (ALT 250 FOR IP): Performed by: INTERNAL MEDICINE

## 2021-09-02 PROCEDURE — G0378 HOSPITAL OBSERVATION PER HR: HCPCS

## 2021-09-02 PROCEDURE — 96361 HYDRATE IV INFUSION ADD-ON: CPT

## 2021-09-02 PROCEDURE — 96372 THER/PROPH/DIAG INJ SC/IM: CPT

## 2021-09-02 PROCEDURE — 0202U NFCT DS 22 TRGT SARS-COV-2: CPT

## 2021-09-02 PROCEDURE — 94667 MNPJ CHEST WALL 1ST: CPT

## 2021-09-02 PROCEDURE — 85007 BL SMEAR W/DIFF WBC COUNT: CPT

## 2021-09-02 PROCEDURE — 94150 VITAL CAPACITY TEST: CPT

## 2021-09-02 PROCEDURE — 93010 ELECTROCARDIOGRAM REPORT: CPT | Performed by: INTERNAL MEDICINE

## 2021-09-02 RX ORDER — SODIUM CHLORIDE, SODIUM LACTATE, POTASSIUM CHLORIDE, CALCIUM CHLORIDE 600; 310; 30; 20 MG/100ML; MG/100ML; MG/100ML; MG/100ML
INJECTION, SOLUTION INTRAVENOUS CONTINUOUS
Status: DISCONTINUED | OUTPATIENT
Start: 2021-09-02 | End: 2021-09-03 | Stop reason: HOSPADM

## 2021-09-02 RX ORDER — SODIUM CHLORIDE, SODIUM LACTATE, POTASSIUM CHLORIDE, AND CALCIUM CHLORIDE .6; .31; .03; .02 G/100ML; G/100ML; G/100ML; G/100ML
500 INJECTION, SOLUTION INTRAVENOUS ONCE
Status: COMPLETED | OUTPATIENT
Start: 2021-09-02 | End: 2021-09-02

## 2021-09-02 RX ADMIN — HYDRALAZINE HYDROCHLORIDE 10 MG: 20 INJECTION, SOLUTION INTRAMUSCULAR; INTRAVENOUS at 02:58

## 2021-09-02 RX ADMIN — SODIUM CHLORIDE, POTASSIUM CHLORIDE, SODIUM LACTATE AND CALCIUM CHLORIDE 500 ML: 600; 310; 30; 20 INJECTION, SOLUTION INTRAVENOUS at 15:04

## 2021-09-02 RX ADMIN — SODIUM CHLORIDE, POTASSIUM CHLORIDE, SODIUM LACTATE AND CALCIUM CHLORIDE: 600; 310; 30; 20 INJECTION, SOLUTION INTRAVENOUS at 15:04

## 2021-09-02 RX ADMIN — ASPIRIN 325 MG ORAL TABLET 325 MG: 325 PILL ORAL at 09:19

## 2021-09-02 RX ADMIN — ENOXAPARIN SODIUM 30 MG: 30 INJECTION SUBCUTANEOUS at 09:19

## 2021-09-02 RX ADMIN — PREDNISONE 40 MG: 20 TABLET ORAL at 09:18

## 2021-09-02 RX ADMIN — FAMOTIDINE 20 MG: 20 TABLET, FILM COATED ORAL at 09:19

## 2021-09-02 RX ADMIN — GUAIFENESIN 600 MG: 600 TABLET, EXTENDED RELEASE ORAL at 09:18

## 2021-09-02 RX ADMIN — GUAIFENESIN 600 MG: 600 TABLET, EXTENDED RELEASE ORAL at 20:25

## 2021-09-02 RX ADMIN — IPRATROPIUM BROMIDE AND ALBUTEROL SULFATE 1 AMPULE: .5; 3 SOLUTION RESPIRATORY (INHALATION) at 02:35

## 2021-09-02 RX ADMIN — IPRATROPIUM BROMIDE AND ALBUTEROL SULFATE 1 AMPULE: .5; 3 SOLUTION RESPIRATORY (INHALATION) at 15:55

## 2021-09-02 RX ADMIN — IPRATROPIUM BROMIDE AND ALBUTEROL SULFATE 1 AMPULE: .5; 3 SOLUTION RESPIRATORY (INHALATION) at 20:10

## 2021-09-02 RX ADMIN — IPRATROPIUM BROMIDE AND ALBUTEROL SULFATE 1 AMPULE: .5; 3 SOLUTION RESPIRATORY (INHALATION) at 07:54

## 2021-09-02 RX ADMIN — AZITHROMYCIN MONOHYDRATE 500 MG: 250 TABLET ORAL at 09:19

## 2021-09-02 RX ADMIN — IPRATROPIUM BROMIDE AND ALBUTEROL SULFATE 1 AMPULE: .5; 3 SOLUTION RESPIRATORY (INHALATION) at 11:47

## 2021-09-02 ASSESSMENT — PAIN SCALES - GENERAL
PAINLEVEL_OUTOF10: 0

## 2021-09-02 NOTE — PROGRESS NOTES
Talked to lab, the ED charge nurse, and RN supervisor multiple times regarding lab work that has not been done. All stated that this would get done once the  gets back from lunch. Will continue to monitor.

## 2021-09-02 NOTE — PLAN OF CARE
Problem: Skin Integrity:  Goal: Will show no infection signs and symptoms  Description: Will show no infection signs and symptoms  9/2/2021 0833 by Raquel Orlando RN  Outcome: Ongoing  9/2/2021 0512 by Walker Connelly RN  Outcome: Ongoing  Goal: Absence of new skin breakdown  Description: Absence of new skin breakdown  9/2/2021 0833 by Raquel Orlando RN  Outcome: Ongoing  9/2/2021 0512 by Walker Connelly RN  Outcome: Ongoing

## 2021-09-02 NOTE — PLAN OF CARE
Problem: Skin Integrity:  Goal: Will show no infection signs and symptoms  Description: Will show no infection signs and symptoms  Outcome: Ongoing  Goal: Absence of new skin breakdown  Description: Absence of new skin breakdown  Outcome: Ongoing     Problem: Falls - Risk of:  Goal: Will remain free from falls  Description: Will remain free from falls  Outcome: Ongoing  Goal: Absence of physical injury  Description: Absence of physical injury  Outcome: Ongoing     Problem: Discharge Planning:  Goal: Discharged to appropriate level of care  Description: Discharged to appropriate level of care  Outcome: Ongoing

## 2021-09-02 NOTE — CARE COORDINATION
Chart reviewed for possible needs at discharge. Pt is from home and was independent PTA. Pt has PCP and insurance with affordable RX copays. CM anticipates pt will discharge home w/ no needs. CM available should needs present. Per pt primary RN, pt is ambulating independently to bathroom.

## 2021-09-02 NOTE — PROGRESS NOTES
Hospitalist Progress Note      Name:  Monty Kaur /Age/Sex: 1940  (80 y.o. female)   MRN & CSN:  [id number] & [id number] Admission Date/Time: 2021 12:43 PM   Location:  OBS  PCP: [facility] Day: 2    Assessment and Plan:   Monty Kaur is a 80 y.o.  female  who presents with SOB, productive cough, malaise. URI, likely RSV  COPD exacerbation  -Presented with shortness of breath, productive cough, malaise, wheezing  -Patient's daughter is currently RSV positive  -Covid negative, BCx with no growth to date  -Chest x-ray with no acute process  -Still with wheezing today  -Continue duo nebs, prednisone, azithromycin, viral respiratory panel pending     Lactic acidosis  -Resolved with IV fluids    Leukocytosis  -Likely secondary to URI and dehydration, improved with IV fluids    Hypotension   Dehydration  -Intermittent hypotension that improves with fluids  -Continue IV fluids  -Monitor BP    Thrombocytopenia  - plts 120   - no signs or symptoms of bleeding   - monitor     Diet ADULT DIET; Regular   DVT Prophylaxis [x] Lovenox, []  Heparin, [] SCDs, [] Ambulation   GI Prophylaxis [] PPI,  [] H2 Blocker,  [] Carafate,  [x] Diet/Tube Feeds   Code Status Full Code   Disposition Patient requires continued admission due to wheezing, hypotension         History of Present Illness:     Chief Complaint: Monty Kaur is a 80 y.o.  female  who presents with shortness of breath, cough, malaise. Patient seen and examined at bedside. No acute events overnight. She states she is feeling tired and still coughing. Initially she did states she went to go home however she is still wheezing and her blood pressure did drop this afternoon. She is doing better on IV fluids. We discussed the plan of care and keeping her an additional night for observation. She was agreeable. Ten point ROS reviewed negative, unless as noted above    Objective:        Intake/Output Summary (Last 24 hours) at 9/2/2021 1607  Last data filed at 9/2/2021 0803  Gross per 24 hour   Intake --   Output 1000 ml   Net -1000 ml      Vitals:   Vitals:    09/02/21 1500   BP: (!) 98/48   Pulse: 105   Resp: 16   Temp: 98.8 °F (37.1 °C)   SpO2:      Physical Exam:     GEN Awake female, sitting upright in bed in no apparent distress. Appears given age. EYES Pupils are equally round. No scleral erythema, discharge, or conjunctivitis. HENT Mucous membranes are moist.  NECK Supple, no apparent thyromegaly or masses. RESP Bilateral diffuse wheezing. Mild tachypnea without overt respiratory distress. CARDIO/VASC   S1/S2 auscultated. Regular rate without appreciable murmurs, rubs, or gallops. Peripheral pulses equal bilaterally and palpable. No peripheral edema. GI Abdomen is soft without significant tenderness, masses, or guarding. Bowel sounds are normoactive.  No costovertebral angle tenderness. Carrillo catheter is not present. MSK No gross joint deformities. SKIN Normal coloration, warm, dry. NEURO Cranial nerves appear grossly intact, normal speech, no lateralizing weakness. PSYCH Awake, alert, oriented x 4. Affect appropriate.     Medications:   Medications:    aspirin  325 mg Oral Daily    predniSONE  40 mg Oral Daily    azithromycin  500 mg Oral Daily    enoxaparin  30 mg SubCUTAneous Daily    famotidine  20 mg Oral Daily    guaiFENesin  600 mg Oral BID    ipratropium-albuterol  1 ampule Inhalation Q4H WA      Infusions:    lactated ringers 100 mL/hr at 09/02/21 1504     PRN Meds: acetaminophen, 650 mg, Q4H PRN  ipratropium-albuterol, 1 ampule, Q4H PRN          Electronically signed by Alfonzo Cantrell PA-C on 9/2/2021 at 4:07 PM

## 2021-09-03 VITALS
BODY MASS INDEX: 31.01 KG/M2 | SYSTOLIC BLOOD PRESSURE: 180 MMHG | HEIGHT: 63 IN | DIASTOLIC BLOOD PRESSURE: 68 MMHG | RESPIRATION RATE: 23 BRPM | OXYGEN SATURATION: 92 % | WEIGHT: 175 LBS | HEART RATE: 100 BPM | TEMPERATURE: 97.8 F

## 2021-09-03 PROCEDURE — G0378 HOSPITAL OBSERVATION PER HR: HCPCS

## 2021-09-03 PROCEDURE — 94669 MECHANICAL CHEST WALL OSCILL: CPT

## 2021-09-03 PROCEDURE — 6370000000 HC RX 637 (ALT 250 FOR IP): Performed by: NURSE PRACTITIONER

## 2021-09-03 PROCEDURE — 6360000002 HC RX W HCPCS: Performed by: NURSE PRACTITIONER

## 2021-09-03 PROCEDURE — 6370000000 HC RX 637 (ALT 250 FOR IP): Performed by: INTERNAL MEDICINE

## 2021-09-03 PROCEDURE — 94150 VITAL CAPACITY TEST: CPT

## 2021-09-03 PROCEDURE — 2580000003 HC RX 258: Performed by: PHYSICIAN ASSISTANT

## 2021-09-03 PROCEDURE — 94640 AIRWAY INHALATION TREATMENT: CPT

## 2021-09-03 PROCEDURE — 96372 THER/PROPH/DIAG INJ SC/IM: CPT

## 2021-09-03 PROCEDURE — 94761 N-INVAS EAR/PLS OXIMETRY MLT: CPT

## 2021-09-03 RX ORDER — IPRATROPIUM BROMIDE AND ALBUTEROL SULFATE 2.5; .5 MG/3ML; MG/3ML
SOLUTION RESPIRATORY (INHALATION)
Qty: 360 ML | Refills: 1 | Status: SHIPPED | OUTPATIENT
Start: 2021-09-03

## 2021-09-03 RX ORDER — FAMOTIDINE 20 MG/1
20 TABLET, FILM COATED ORAL DAILY
Qty: 30 TABLET | Refills: 0 | Status: SHIPPED | OUTPATIENT
Start: 2021-09-04 | End: 2022-08-16 | Stop reason: CLARIF

## 2021-09-03 RX ORDER — AZITHROMYCIN 500 MG/1
500 TABLET, FILM COATED ORAL DAILY
Qty: 5 TABLET | Refills: 0 | Status: SHIPPED | OUTPATIENT
Start: 2021-09-04 | End: 2021-09-09

## 2021-09-03 RX ORDER — ALBUTEROL SULFATE 90 UG/1
2 AEROSOL, METERED RESPIRATORY (INHALATION) EVERY 6 HOURS PRN
Qty: 18 G | Refills: 0 | Status: SHIPPED | OUTPATIENT
Start: 2021-09-03

## 2021-09-03 RX ORDER — METHYLPREDNISOLONE 4 MG/1
TABLET ORAL
Qty: 21 TABLET | Refills: 0 | Status: SHIPPED | OUTPATIENT
Start: 2021-09-03 | End: 2021-09-09

## 2021-09-03 RX ORDER — GUAIFENESIN 600 MG/1
600 TABLET, EXTENDED RELEASE ORAL 2 TIMES DAILY
Qty: 60 TABLET | Refills: 0 | Status: SHIPPED | OUTPATIENT
Start: 2021-09-03 | End: 2022-08-16 | Stop reason: CLARIF

## 2021-09-03 RX ADMIN — IPRATROPIUM BROMIDE AND ALBUTEROL SULFATE 1 AMPULE: .5; 3 SOLUTION RESPIRATORY (INHALATION) at 07:05

## 2021-09-03 RX ADMIN — AZITHROMYCIN MONOHYDRATE 500 MG: 250 TABLET ORAL at 09:15

## 2021-09-03 RX ADMIN — GUAIFENESIN 600 MG: 600 TABLET, EXTENDED RELEASE ORAL at 09:15

## 2021-09-03 RX ADMIN — SODIUM CHLORIDE, POTASSIUM CHLORIDE, SODIUM LACTATE AND CALCIUM CHLORIDE: 600; 310; 30; 20 INJECTION, SOLUTION INTRAVENOUS at 05:13

## 2021-09-03 RX ADMIN — ENOXAPARIN SODIUM 30 MG: 30 INJECTION SUBCUTANEOUS at 09:15

## 2021-09-03 RX ADMIN — IPRATROPIUM BROMIDE AND ALBUTEROL SULFATE 1 AMPULE: .5; 3 SOLUTION RESPIRATORY (INHALATION) at 04:00

## 2021-09-03 RX ADMIN — PREDNISONE 40 MG: 20 TABLET ORAL at 09:15

## 2021-09-03 RX ADMIN — ASPIRIN 325 MG ORAL TABLET 325 MG: 325 PILL ORAL at 09:15

## 2021-09-03 RX ADMIN — FAMOTIDINE 20 MG: 20 TABLET, FILM COATED ORAL at 09:14

## 2021-09-03 ASSESSMENT — PAIN SCALES - GENERAL
PAINLEVEL_OUTOF10: 0
PAINLEVEL_OUTOF10: 0

## 2021-09-03 NOTE — CONSULTS
Discussed consult with primary nurse. Consult canceled. PIV placed by ED nurse. Please consult IV/PICC team if patient's needs change.

## 2021-09-03 NOTE — CARE COORDINATION
Chart reviewed. CM followed up with patient as she is going home from the observation unit today. She states that her daughter will pick her up. She confirms she still feels a bit SOB but she is feeling better. She states she \"gets like this sometimes\" she admits to smoking a pack a day of cigarettes. She states she does not smoke as much in the winter and she does not smoke when she is exacerbated. She admits that her daughter has tons of DME if needed including a shower chair, wheelchair, and walker. No other needs identified.

## 2021-09-03 NOTE — DISCHARGE SUMMARY
Sharita Monroy MD, [address]   Internal Medicine Hospitalist  Discharge Summary    Mansi Laura  :  1940  MRN:  [id number]    Admit date:  2021  Discharge date:  9/3/2021    Admitting Physician:  Vesta Gil MD    Discharge Diagnoses:    Patient Active Problem List   Diagnosis    COPD exacerbation ([facility] [address]    PVD (peripheral vascular disease) with claudication [facility])       Admission Condition:  fair    Discharged Condition:  stable    Hospital Course:     (copied from admission history)  79-year-old female with history of COPD not on home O2, who presents with reports of fever shortness of breath, cough productive with sputum poor appetite, and overall malaise. She was seen on telehealth visit yesterday and prescribed azithromycin and prednisone. Her daughter tested positive for RSV; patient resides with her daughter. Denies chest pain or pressure, denies blood in stool or urine. 9/3/2021-I saw her for the first time today she is in the observation unit. Was admitted with COPD exacerbation. She still has some wheezing but she feels not short of breath anymore has been able to ambulate her oxygen dropped to 90% and came up within a  minute to 93 %. . She wants to go home and feels confident doing so. Continues to smoke has been advised to quit smoking. She is positive for RSV virus. She was negative for SARS-CoV-2. Her lactic acid was 2.5 on admission came down to 0.9 the next day. Chest x-ray showed no evidence of acute cardiopulmonary disease. Her last platelet count was 921 needs to follow-up closely with her primary care physician. Her blood pressures fluctuate from higher to low to normal.  We will also need to follow-up with her primary care physician. It could be secondary to her breathing.     Hospital Course:  (copied from last soap)  URI, likely RSV  COPD exacerbation  -Presented with shortness of breath, productive cough, malaise, wheezing  -Patient's daughter is currently RSV positive  -Covid negative, BCx with no growth to date  -Chest x-ray with no acute process  -Still with wheezing today  -Continue duo nebs, prednisone, azithromycin, viral respiratory panel pending      Lactic acidosis  -Resolved with IV fluids     Leukocytosis  -Likely secondary to URI and dehydration, improved with IV fluids     Hypotension   Dehydration  -Intermittent hypotension that improves with fluids  -Continue IV fluids  -Monitor BP     Thrombocytopenia  - plts 120   - no signs or symptoms of bleeding   - monitor     Follow up appointment / plans: Needs to be seen within 7 days by his/her physician, patient to call for an appointment. On examination today  Heart is RRR, Lungs are CTA, abdomen is soft and non tender, CNS is grossly intact. Please see detailed consultation notes and radiology dictations as below. Vitals: Blood pressure (!) 180/68, pulse 100, temperature 97.8 °F (36.6 °C), temperature source Oral, resp. rate 23, height 5' 3\" (1.6 m), weight 175 lb (79.4 kg), SpO2 92 %. Discharge Medications:        Medication List      START taking these medications    albuterol sulfate  (90 Base) MCG/ACT inhaler  Commonly known as: Proventil HFA  Inhale 2 puffs into the lungs every 6 hours as needed for Wheezing or Shortness of Breath     famotidine 20 MG tablet  Commonly known as: PEPCID  Take 1 tablet by mouth daily  Start taking on: September 4, 2021     guaiFENesin 600 MG extended release tablet  Commonly known as: MUCINEX  Take 1 tablet by mouth 2 times daily     methylPREDNISolone 4 MG tablet  Commonly known as: MEDROL DOSEPACK  Take by mouth.         CHANGE how you take these medications    azithromycin 500 MG tablet  Commonly known as: ZITHROMAX  Take 1 tablet by mouth daily for 5 doses  Start taking on: September 4, 2021  What changed:   · medication strength  · how much to take        CONTINUE taking these medications    aspirin 325 MG tablet     budesonide-formoterol 160-4.5 MCG/ACT Aero  Commonly known as: Symbicort  Inhale 2 puffs into the lungs 2 times daily     ipratropium-albuterol 0.5-2.5 (3) MG/3ML Soln nebulizer solution  Commonly known as: DUONEB  USE 1 VIAL IN [facility] 4 TIMES DAILY        STOP taking these medications    OSTEO BI-FLEX ONE PER DAY PO     predniSONE 20 MG tablet  Commonly known as: Christine Goldberg           Where to Get Your Medications      You can get these medications from any pharmacy    Bring a paper prescription for each of these medications  · albuterol sulfate  (90 Base) MCG/ACT inhaler  · azithromycin 500 MG tablet  · famotidine 20 MG tablet  · guaiFENesin 600 MG extended release tablet  · ipratropium-albuterol 0.5-2.5 (3) MG/3ML Soln nebulizer solution  · methylPREDNISolone 4 MG tablet         Consults:      None    Significant Diagnostic Studies:   Blood work reviewed.    CBC with Differential:    Lab Results   Component Value Date    WBC 7.6 09/02/2021    RBC 5.12 09/02/2021    HGB 15.0 09/02/2021    HCT 45.8 09/02/2021     09/02/2021    MCV 89.5 09/02/2021    MCH 29.3 09/02/2021    MCHC 32.8 09/02/2021    RDW 13.0 09/02/2021    SEGSPCT 82.0 09/02/2021    BANDSPCT 1 09/02/2021    LYMPHOPCT 7.0 09/02/2021    MONOPCT 9.0 09/02/2021    BASOPCT 0.3 09/01/2021    MONOSABS 0.7 09/02/2021    LYMPHSABS 0.5 09/02/2021    EOSABS 0.0 09/01/2021    BASOSABS 0.0 09/01/2021    DIFFTYPE MANUAL DIFFERENTIAL 09/02/2021     CMP:    Lab Results   Component Value Date     09/02/2021    K 3.9 09/02/2021     09/02/2021    CO2 22 09/02/2021    BUN 15 09/02/2021    CREATININE 0.7 09/02/2021    GFRAA >60 09/02/2021    LABGLOM >60 09/02/2021    GLUCOSE 121 09/02/2021    PROT 6.4 09/02/2021    LABALBU 3.9 09/02/2021    CALCIUM 8.5 09/02/2021    BILITOT 0.3 09/02/2021    ALKPHOS 75 09/02/2021    AST 12 09/02/2021    ALT 10 09/02/2021     XR CHEST PORTABLE [[id number]: 09/01/21 1340  Order Status: CompletedUpdated: 09/01/21 1344  Narrative: EXAMINATION:   ONE XRAY VIEW OF THE CHEST     9/1/2021 12:44 pm     COMPARISON:   September 2, 2019     HISTORY:   ORDERING SYSTEM PROVIDED HISTORY: SOB   TECHNOLOGIST PROVIDED HISTORY:   Reason for exam:->SOB   Reason for Exam: sob   Acuity: Unknown   Type of Exam: Unknown   Relevant Medical/Surgical History: copd     FINDINGS:   No evidence of pneumonia, edema or other acute pulmonary process. No evidence   of acute process of the cardiac or mediastinal structures. No evidence of   pneumothorax or pleural effusion. Impression:   No evidence of acute cardiopulmonary disease. Disposition:   home  All the above has been explained to patient and or family. Patient would need F/U with his/her PCP in 7 days. Explained that it is the patients responsibility to have blood work or radiology testing done as an out patient. He/She has to take the discharge papers from the hospital for out patient follow up visit with the PCP/Physician. Time spent  <30 minutes. The above chart was partly created by using Dragon dictation system, it may contain dictation errors given the limitations of this technology.      Signed:  Donna Canales MD MD, FACP  9/3/2021, 9:51 AM

## 2021-09-06 LAB
CULTURE: NORMAL
CULTURE: NORMAL
Lab: NORMAL
Lab: NORMAL
SPECIMEN: NORMAL
SPECIMEN: NORMAL

## 2021-10-01 ENCOUNTER — RX ONLY (OUTPATIENT)
Age: 81
Setting detail: RX ONLY
End: 2021-10-01

## 2022-02-14 ENCOUNTER — APPOINTMENT (OUTPATIENT)
Dept: CT IMAGING | Age: 82
End: 2022-02-14
Payer: MEDICARE

## 2022-02-14 ENCOUNTER — HOSPITAL ENCOUNTER (EMERGENCY)
Age: 82
Discharge: HOME | End: 2022-02-14
Attending: EMERGENCY MEDICINE
Payer: MEDICARE

## 2022-02-14 VITALS
DIASTOLIC BLOOD PRESSURE: 69 MMHG | TEMPERATURE: 98 F | HEIGHT: 63 IN | RESPIRATION RATE: 18 BRPM | SYSTOLIC BLOOD PRESSURE: 167 MMHG | HEART RATE: 99 BPM | OXYGEN SATURATION: 97 % | WEIGHT: 180 LBS | BODY MASS INDEX: 31.89 KG/M2

## 2022-02-14 DIAGNOSIS — K11.20 PAROTITIS: Primary | ICD-10-CM

## 2022-02-14 DIAGNOSIS — R22.0 FACIAL SWELLING: ICD-10-CM

## 2022-02-14 LAB
ALBUMIN SERPL-MCNC: 3.8 GM/DL (ref 3.4–5)
ALP BLD-CCNC: 75 IU/L (ref 40–129)
ALT SERPL-CCNC: 12 U/L (ref 10–40)
ANION GAP SERPL CALCULATED.3IONS-SCNC: 9 MMOL/L (ref 4–16)
AST SERPL-CCNC: 15 IU/L (ref 15–37)
BILIRUB SERPL-MCNC: 0.3 MG/DL (ref 0–1)
BUN BLDV-MCNC: 7 MG/DL (ref 6–23)
CALCIUM SERPL-MCNC: 8.9 MG/DL (ref 8.3–10.6)
CHLORIDE BLD-SCNC: 101 MMOL/L (ref 99–110)
CO2: 28 MMOL/L (ref 21–32)
CREAT SERPL-MCNC: 0.8 MG/DL (ref 0.6–1.1)
GFR AFRICAN AMERICAN: >60 ML/MIN/1.73M2
GFR NON-AFRICAN AMERICAN: >60 ML/MIN/1.73M2
GLUCOSE BLD-MCNC: 109 MG/DL (ref 70–99)
POTASSIUM SERPL-SCNC: 3.8 MMOL/L (ref 3.5–5.1)
SODIUM BLD-SCNC: 138 MMOL/L (ref 135–145)
TOTAL PROTEIN: 6.4 GM/DL (ref 6.4–8.2)

## 2022-02-14 PROCEDURE — 80053 COMPREHEN METABOLIC PANEL: CPT

## 2022-02-14 PROCEDURE — 70492 CT SFT TSUE NCK W/O & W/DYE: CPT

## 2022-02-14 PROCEDURE — 99284 EMERGENCY DEPT VISIT MOD MDM: CPT

## 2022-02-14 PROCEDURE — 6360000004 HC RX CONTRAST MEDICATION: Performed by: EMERGENCY MEDICINE

## 2022-02-14 PROCEDURE — 6370000000 HC RX 637 (ALT 250 FOR IP): Performed by: EMERGENCY MEDICINE

## 2022-02-14 RX ORDER — AMOXICILLIN AND CLAVULANATE POTASSIUM 875; 125 MG/1; MG/1
1 TABLET, FILM COATED ORAL 2 TIMES DAILY
Qty: 20 TABLET | Refills: 0 | Status: SHIPPED | OUTPATIENT
Start: 2022-02-14 | End: 2022-02-24

## 2022-02-14 RX ORDER — AMOXICILLIN AND CLAVULANATE POTASSIUM 875; 125 MG/1; MG/1
1 TABLET, FILM COATED ORAL ONCE
Status: COMPLETED | OUTPATIENT
Start: 2022-02-14 | End: 2022-02-14

## 2022-02-14 RX ADMIN — IOPAMIDOL 75 ML: 755 INJECTION, SOLUTION INTRAVENOUS at 19:14

## 2022-02-14 RX ADMIN — AMOXICILLIN AND CLAVULANATE POTASSIUM 1 TABLET: 875; 125 TABLET, FILM COATED ORAL at 20:47

## 2022-02-14 ASSESSMENT — PAIN DESCRIPTION - DESCRIPTORS: DESCRIPTORS: ACHING

## 2022-02-14 ASSESSMENT — PAIN DESCRIPTION - PAIN TYPE: TYPE: ACUTE PAIN

## 2022-02-14 ASSESSMENT — PAIN DESCRIPTION - ORIENTATION: ORIENTATION: RIGHT

## 2022-02-14 ASSESSMENT — PAIN DESCRIPTION - FREQUENCY: FREQUENCY: CONTINUOUS

## 2022-02-14 ASSESSMENT — PAIN SCALES - GENERAL: PAINLEVEL_OUTOF10: 5

## 2022-02-14 ASSESSMENT — PAIN DESCRIPTION - LOCATION: LOCATION: FACE

## 2022-02-14 NOTE — ED PROVIDER NOTES
Triage Chief Complaint:   Facial Swelling (right side that started at 1500 today )      Kiana:  Fernando Miranda is a 80 y.o. female that presents to the emergency department with right-sided facial swelling. Patient states this started around 3 PM today as she was eating something. She stopped eating and eventually the swelling started to go down. She tried to eat something again and the pain returned. She states she has had this happen once before and believes it was a salivary duct obstruction. She wears dentures both upper and lower gums. No difficulty breathing or swallowing. Past Medical History:   Diagnosis Date    COPD (chronic obstructive pulmonary disease) ([facility] [address]      History reviewed. No pertinent surgical history. History reviewed. No pertinent family history. Social History     Socioeconomic History    Marital status:      Spouse name: Not on file    Number of children: Not on file    Years of education: Not on file    Highest education level: Not on file   Occupational History    Not on file   Tobacco Use    Smoking status: Current Some Day Smoker     Packs/day: 0.50     Years: 25.00     Pack years: 12.50     Types: Cigarettes     Start date: 8/30/1984    Smokeless tobacco: Never Used   Substance and Sexual Activity    Alcohol use: Never    Drug use: Never    Sexual activity: Not on file   Other Topics Concern    Not on file   Social History Narrative    Not on file     Social Determinants of Health     Financial Resource Strain:     Difficulty of Paying Living Expenses: Not on file   Food Insecurity:     Worried About [address] in the Last Year: Not on file    Lucie of Food in the Last Year: Not on file   Transportation Needs:     Lack of Transportation (Medical): Not on file    Lack of Transportation (Non-Medical):  Not on file   Physical Activity:     Days of Exercise per Week: Not on file    Minutes of Exercise per Session: Not on file   Stress:     Feeling of Stress : Not on file   Social Connections:     Frequency of Communication with Friends and Family: Not on file    Frequency of Social Gatherings with Friends and Family: Not on file    Attends Religion Services: Not on file    Active Member of Clubs or Organizations: Not on file    Attends Club or Organization Meetings: Not on file    Marital Status: Not on file   Intimate Partner Violence:     Fear of Current or Ex-Partner: Not on file    Emotionally Abused: Not on file    Physically Abused: Not on file    Sexually Abused: Not on file   Housing Stability:     Unable to Pay for Housing in the Last Year: Not on file    Number of [place] in the Last Year: Not on file    Unstable Housing in the Last Year: Not on file     No current facility-administered medications for this encounter. Current Outpatient Medications   Medication Sig Dispense Refill    ipratropium-albuterol (DUONEB) 0.5-2.5 (3) MG/3ML SOLN nebulizer solution USE 1 VIAL IN [facility] 4 TIMES DAILY 360 mL 1    albuterol sulfate HFA (PROVENTIL HFA) 108 (90 Base) MCG/ACT inhaler Inhale 2 puffs into the lungs every 6 hours as needed for Wheezing or Shortness of Breath 18 g 0    budesonide-formoterol (SYMBICORT) 160-4.5 MCG/ACT AERO Inhale 2 puffs into the lungs 2 times daily 1 Inhaler 5    aspirin 325 MG tablet Take 81 mg by mouth daily       guaiFENesin (MUCINEX) 600 MG extended release tablet Take 1 tablet by mouth 2 times daily (Patient not taking: Reported on 2/14/2022) 60 tablet 0    famotidine (PEPCID) 20 MG tablet Take 1 tablet by mouth daily (Patient not taking: Reported on 2/14/2022) 30 tablet 0     No Known Allergies  Nursing Notes Reviewed    ROS:  At least 10 systems reviewed and otherwise negative except as in the [place].     Physical Exam:  ED Triage Vitals [02/14/22 1707]   Enc Vitals Group      BP (!) 167/69      Pulse 99      Resp 18      Temp 98 °F (36.7 °C)      Temp Source Infrared      SpO2 97 %      Weight 180 lb (81.6 kg)      Height 5' 3\" (1.6 m)      Head Circumference       Peak Flow       Pain Score       Pain Loc       Pain Edu? Excl. in [address]? My pulse oximetry interpretation is which is within the normal range    GENERAL APPEARANCE: Awake and alert. Cooperative. No acute distress. HEAD:  Atraumatic. EYES: EOM's grossly intact. ENT: Mucous membranes are moist.  No trismus. Oropharynx clear. Teeth removed on lower gums. Dentures in place on the upper gums. No erythema to the gums or swelling or abscess. Swelling to the right periauricular and infra-auricular area. TMs clear bilaterally. No tenderness over the mastoid area. NECK:  Trachea midline. EXTREMITIES: No acute deformities. SKIN: Warm and dry. NEUROLOGICAL: No gross facial drooping. Moves all 4 extremities spontaneously. PSYCHIATRIC: Normal mood. I have reviewed and interpreted all of the currently available lab results from this visit (if applicable):  No results found for this visit on 02/14/22. EKG: (All EKG's are interpreted by myself in the absence of a cardiologist)      MDM:  I have ordered chemistry and a CT scan with and without contrast of the patient's neck after discussion with the radiology technician. Currently awaiting CT findings. Patient signed out to the oncoming provider.   Please refer to their note for disposition      (Please note that portions of this note may have been completed with a voice recognition program. Efforts were made to edit the dictations but occasionally words are mis-transcribed.)    MD Arturo Cruz MD  02/14/22 9807

## 2022-02-14 NOTE — ED NOTES
The patient presents to the er today with complaints of right side facial pain and swelling. She reports that it started suddenly today at 1500. She reports that when she got here the swelling had gone down. She denies any swelling of the tongue or throat.                           Van Toscano RN  02/14/22 4739

## 2022-02-15 NOTE — ED PROVIDER NOTES
Emergency Department Encounter  Location: [address] [place] [address]    Patient: Denny Gregory  MRN: [id number]  : 1940  Date of evaluation: 2022  ED Provider: Sara Godoy MD    7PM  Denny Gregory was checked out to me by Dr. Alejandro Curtis. Please see his/her initial documentation for details of the patient's initial ED presentation, physical exam and completed studies. In brief, Denny Gregory is a 80 y.o. female that presented to the emergency department with right-sided facial swelling. It worsened when she was eating and then got better when she stopped eating and then worsened again when she started eating again. No fevers. Awaiting CT scan results. I have reviewed and interpreted all of the currently available lab results and diagnostics from this visit:  Results for orders placed or performed during the hospital encounter of 22   CMP   Result Value Ref Range    Sodium 138 135 - 145 MMOL/L    Potassium 3.8 3.5 - 5.1 MMOL/L    Chloride 101 99 - 110 mMol/L    CO2 28 21 - 32 MMOL/L    BUN 7 6 - 23 MG/DL    CREATININE 0.8 0.6 - 1.1 MG/DL    Glucose 109 (H) 70 - 99 MG/DL    Calcium 8.9 8.3 - 10.6 MG/DL    Albumin 3.8 3.4 - 5.0 GM/DL    Total Protein 6.4 6.4 - 8.2 GM/DL    Total Bilirubin 0.3 0.0 - 1.0 MG/DL    ALT 12 10 - 40 U/L    AST 15 15 - 37 IU/L    Alkaline Phosphatase 75 40 - 129 IU/L    GFR Non-African American >60 >60 mL/min/1.73m2    GFR African American >60 >60 mL/min/1.73m2    Anion Gap 9 4 - 16     CT SOFT TISSUE NECK W WO CONTRAST    Result Date: 2022  EXAMINATION: CT OF THE NECK WITHOUT AND WITH CONTRAST  2022 TECHNIQUE: CT of the neck was performed without and with the administration of intravenous contrast. Multiplanar reformatted images are provided for review. Dose modulation, iterative reconstruction, and/or weight based adjustment of the mA/kV was utilized to reduce the radiation dose to as low as reasonably achievable. COMPARISON: None. HISTORY: ORDERING SYSTEM PROVIDED HISTORY: Facial swelling TECHNOLOGIST PROVIDED HISTORY: Reason for exam:->right facial swelling, salivary gland obstruction? FINDINGS: The lung apices are clear. The visualized portions of the thyroid gland are grossly within normal limits. No retropharyngeal free fluid is identified. The left parotid gland and submandibular glands are grossly within normal limits. There is asymmetric thickening of the right parotid gland with mild surrounding inflammatory change. This extends caudally to the level of the thyroid cartilage, with thickening of the ipsilateral platysma. No salivary stones are visualized. There is no subcutaneous emphysema or fluid collection. An air-fluid level is noted within the right maxillary antrum. The nasopharynx, hypopharynx, larynx, and upper esophagus are grossly within normal limits. There is asymmetric soft tissue prominence of the right sided eric pharyngeal soft tissues. The apparent asymmetry could be related to a prior contralateral tonsillectomy but correlate with clinical exam to exclude presence of a right-sided oropharyngeal mass. Vascular calcifications are noted. No pathologically enlarged lymph nodes are identified. The patient is edentulous. Mild degenerative changes of the C-spine are noted     Mild right-sided parotitis, of uncertain etiology. Asymmetric prominence of the oropharyngeal soft tissues, on the right-hand side. Correlate with clinical exam to rule out presence of a mass. Final ED Course and MDM: In brief, Denny Gregory is a 80 y.o. female whose care was signed out to me by the outgoing provider. CT scan shows mild right-sided parotitis of uncertain etiology. There is also asymmetric prominence of the oropharyngeal soft tissues.   The soft tissues are asymmetric on exam.  Patient explains that she had a tonsillectomy when she was younger but they were unable to complete the tonsillectomy so they are not completely removed on one side. I suspect this is the reason for the asymmetry. We will treat her with Augmentin and recommended lemon drops for the parotitis. Will discharge home stable condition. Recommended close follow-up with her primary care physician and if she continues to have issues I recommended she follow-up with ENT. Plan of care explained to patient. Concerning signs and symptoms warranting a return visit to the Emergency Department were explained in detail. All questions and concerns were addressed to the patient's satisfaction. Patient understood and agreed with plan. I did don appropriate PPE (including N95 face mask, protective eye ware/safety glasses, gloves, hair covering, and no isolation gown), as recommended by the health facility/national standard best practice, during my bedside interactions with the patient. ED Medication Orders (From admission, onward)    Start Ordered     Status Ordering Provider    02/14/22 2045 02/14/22 2044  amoxicillin-clavulanate (AUGMENTIN) 875-125 MG per tablet 1 tablet  ONCE        Question:  Antimicrobial Indications  Answer:  Head and Neck Infection    Last MAR action: Given - by Asha Carmen on 02/14/22 at 2047 PARRY, ANDER E    02/14/22 1914 02/14/22 1914  iopamidol (ISOVUE-370) 76 % injection 75 mL  IMG ONCE PRN         Last MAR action: Given - by Zoie Dean on 02/14/22 at 1914 Bebe ROBERTS          Final Impression      1. Parotitis    2.  Facial swelling        DISPOSITION Decision To Discharge 02/14/2022 08:43:02 PM     (Please note that portions of this note may have been completed with a voice recognition program. Efforts were made to edit the dictations but occasionally words are mis-transcribed.)    Jana Souza MD  [id number] [address] MD  02/14/22 1913

## 2022-02-15 NOTE — ED NOTES
Pt returned from radiology via [employer] [employer] [facility] [employer]  02/14/22 1916

## 2022-03-01 ENCOUNTER — APPOINTMENT (OUTPATIENT)
Dept: CT IMAGING | Age: 82
End: 2022-03-01
Payer: MEDICARE

## 2022-03-01 ENCOUNTER — HOSPITAL ENCOUNTER (EMERGENCY)
Age: 82
Discharge: HOME | End: 2022-03-02
Payer: MEDICARE

## 2022-03-01 ENCOUNTER — APPOINTMENT (OUTPATIENT)
Dept: GENERAL RADIOLOGY | Age: 82
End: 2022-03-01
Payer: MEDICARE

## 2022-03-01 VITALS
WEIGHT: 167 LBS | HEIGHT: 63 IN | TEMPERATURE: 98.5 F | RESPIRATION RATE: 16 BRPM | BODY MASS INDEX: 29.59 KG/M2 | SYSTOLIC BLOOD PRESSURE: 135 MMHG | HEART RATE: 92 BPM | DIASTOLIC BLOOD PRESSURE: 65 MMHG | OXYGEN SATURATION: 92 %

## 2022-03-01 DIAGNOSIS — N39.0 URINARY TRACT INFECTION WITHOUT HEMATURIA, SITE UNSPECIFIED: Primary | ICD-10-CM

## 2022-03-01 DIAGNOSIS — R68.83 CHILLS: ICD-10-CM

## 2022-03-01 LAB
ALBUMIN SERPL-MCNC: 3.8 GM/DL (ref 3.4–5)
ALP BLD-CCNC: 78 IU/L (ref 40–128)
ALT SERPL-CCNC: 9 U/L (ref 10–40)
ANION GAP SERPL CALCULATED.3IONS-SCNC: 12 MMOL/L (ref 4–16)
ANISOCYTOSIS: ABNORMAL
AST SERPL-CCNC: 13 IU/L (ref 15–37)
BACTERIA: NEGATIVE /HPF
BASOPHILS ABSOLUTE: 0 K/CU MM
BASOPHILS RELATIVE PERCENT: 0.1 % (ref 0–1)
BILIRUB SERPL-MCNC: 0.5 MG/DL (ref 0–1)
BILIRUBIN URINE: NEGATIVE MG/DL
BLOOD, URINE: ABNORMAL
BUN BLDV-MCNC: 8 MG/DL (ref 6–23)
CALCIUM SERPL-MCNC: 8.7 MG/DL (ref 8.3–10.6)
CHLORIDE BLD-SCNC: 104 MMOL/L (ref 99–110)
CLARITY: CLEAR
CO2: 23 MMOL/L (ref 21–32)
COLOR: YELLOW
CREAT SERPL-MCNC: 0.8 MG/DL (ref 0.6–1.1)
DIFFERENTIAL TYPE: ABNORMAL
EOSINOPHILS ABSOLUTE: 0 K/CU MM
EOSINOPHILS RELATIVE PERCENT: 0.4 % (ref 0–3)
GFR AFRICAN AMERICAN: >60 ML/MIN/1.73M2
GFR NON-AFRICAN AMERICAN: >60 ML/MIN/1.73M2
GLUCOSE BLD-MCNC: 123 MG/DL (ref 70–99)
GLUCOSE, URINE: NEGATIVE MG/DL
HCT VFR BLD CALC: 44 % (ref 37–47)
HEMOGLOBIN: 14.1 GM/DL (ref 12.5–16)
IMMATURE NEUTROPHIL %: 1.9 % (ref 0–0.43)
KETONES, URINE: ABNORMAL MG/DL
LACTATE: 1.3 MMOL/L (ref 0.4–2)
LEUKOCYTE ESTERASE, URINE: ABNORMAL
LYMPHOCYTES ABSOLUTE: 1.2 K/CU MM
LYMPHOCYTES RELATIVE PERCENT: 14.4 % (ref 24–44)
MCH RBC QN AUTO: 29.6 PG (ref 27–31)
MCHC RBC AUTO-ENTMCNC: 32 % (ref 32–36)
MCV RBC AUTO: 92.2 FL (ref 78–100)
MONOCYTES ABSOLUTE: 2.5 K/CU MM
MONOCYTES RELATIVE PERCENT: 30.7 % (ref 0–4)
MUCUS: ABNORMAL HPF
NITRITE URINE, QUANTITATIVE: NEGATIVE
PDW BLD-RTO: 13.1 % (ref 11.7–14.9)
PH, URINE: 5 (ref 5–8)
PLATELET # BLD: 137 K/CU MM (ref 140–440)
PLT MORPHOLOGY: ABNORMAL
PMV BLD AUTO: 10 FL (ref 7.5–11.1)
POTASSIUM SERPL-SCNC: 3.8 MMOL/L (ref 3.5–5.1)
PROTEIN UA: ABNORMAL MG/DL
RAPID INFLUENZA  B AGN: NEGATIVE
RAPID INFLUENZA A AGN: NEGATIVE
RBC # BLD: 4.77 M/CU MM (ref 4.2–5.4)
RBC URINE: 9 /HPF (ref 0–6)
SARS-COV-2, NAAT: NOT DETECTED
SEGMENTED NEUTROPHILS ABSOLUTE COUNT: 4.4 K/CU MM
SEGMENTED NEUTROPHILS RELATIVE PERCENT: 52.5 % (ref 36–66)
SODIUM BLD-SCNC: 139 MMOL/L (ref 135–145)
SOURCE: NORMAL
SPECIFIC GRAVITY UA: >1.03 (ref 1–1.03)
SQUAMOUS EPITHELIAL: 12 /HPF
TOTAL IMMATURE NEUTOROPHIL: 0.16 K/CU MM
TOTAL PROTEIN: 6.5 GM/DL (ref 6.4–8.2)
UROBILINOGEN, URINE: 1 MG/DL (ref 0.2–1)
WBC # BLD: 8.3 K/CU MM (ref 4–10.5)
WBC UA: 9 /HPF (ref 0–5)

## 2022-03-01 PROCEDURE — 87086 URINE CULTURE/COLONY COUNT: CPT

## 2022-03-01 PROCEDURE — 80053 COMPREHEN METABOLIC PANEL: CPT

## 2022-03-01 PROCEDURE — 87804 INFLUENZA ASSAY W/OPTIC: CPT

## 2022-03-01 PROCEDURE — 2580000003 HC RX 258: Performed by: PHYSICIAN ASSISTANT

## 2022-03-01 PROCEDURE — 6360000002 HC RX W HCPCS: Performed by: PHYSICIAN ASSISTANT

## 2022-03-01 PROCEDURE — 85025 COMPLETE CBC W/AUTO DIFF WBC: CPT

## 2022-03-01 PROCEDURE — 74176 CT ABD & PELVIS W/O CONTRAST: CPT

## 2022-03-01 PROCEDURE — 99283 EMERGENCY DEPT VISIT LOW MDM: CPT

## 2022-03-01 PROCEDURE — 71045 X-RAY EXAM CHEST 1 VIEW: CPT

## 2022-03-01 PROCEDURE — 83605 ASSAY OF LACTIC ACID: CPT

## 2022-03-01 PROCEDURE — 87635 SARS-COV-2 COVID-19 AMP PRB: CPT

## 2022-03-01 PROCEDURE — 96365 THER/PROPH/DIAG IV INF INIT: CPT

## 2022-03-01 PROCEDURE — 51798 US URINE CAPACITY MEASURE: CPT

## 2022-03-01 PROCEDURE — 81001 URINALYSIS AUTO W/SCOPE: CPT

## 2022-03-01 RX ADMIN — CEFTRIAXONE SODIUM 1000 MG: 1 INJECTION, POWDER, FOR SOLUTION INTRAMUSCULAR; INTRAVENOUS at 22:27

## 2022-03-01 ASSESSMENT — PAIN DESCRIPTION - DESCRIPTORS: DESCRIPTORS: ACHING;SHARP

## 2022-03-01 ASSESSMENT — PAIN DESCRIPTION - LOCATION: LOCATION: ABDOMEN;FLANK

## 2022-03-01 ASSESSMENT — PAIN DESCRIPTION - PAIN TYPE: TYPE: ACUTE PAIN

## 2022-03-01 ASSESSMENT — PAIN SCALES - GENERAL: PAINLEVEL_OUTOF10: 5

## 2022-03-01 ASSESSMENT — PAIN DESCRIPTION - ORIENTATION: ORIENTATION: LEFT;RIGHT

## 2022-03-01 ASSESSMENT — PAIN DESCRIPTION - FREQUENCY: FREQUENCY: INTERMITTENT

## 2022-03-01 NOTE — ED PROVIDER NOTES
PROVIDER IN [address] [place] [address]  eMERGENCY dEPARTMENT eNCOUnter      Pt Name: Lazarus Caceres  MRN: [id number]  Date of evaluation: 3/1/2022      Chief Complaint:    Chief Complaint   Patient presents with    Urinary Retention     c/o trouble urinating    Dysuria     dx with UTI and given ATB yesterday    Chills     c/o chills today       HPI:  This is a  80 y.o. female who presents to the emergency department who presents to emergency department today with \"bladder symptoms\". Patient states that she was seen at her doctor yesterday and she had on antibiotics for bladder infection. Now states she is having some chills, \"cannot get warm\". States he is having trouble urinating. No other significant abdominal pain. Physical Exam: (Pertinent Negatives and Positives)  ED Triage Vitals   BP Temp Temp src Pulse Resp SpO2 Height Weight   -- -- -- -- -- -- -- --       PLAN:  LABS:   Labs Reviewed   CULTURE, URINE   CBC WITH AUTO DIFFERENTIAL   COMPREHENSIVE METABOLIC PANEL   URINALYSIS   LACTIC ACID     Radiology Studies:   No orders to display       Patient seen and examined. Work-up initiated from triage. Patient was seen by me in triage.  Please see the full history, physical and final disposition note by the other provider in main emergency department    (Please note sections of this note were completed with a voice recognition program.  Efforts were made to edit the dictations but occasionally words are mis-transcribed.)    Electronically signed, Rosalba Kwon,         Rosalba Cespedes  03/01/22 0747

## 2022-03-02 NOTE — ED PROVIDER NOTES
Emergency [address] EMERGENCY DEPARTMENT    Patient: Lazarus Caceres  MRN: [id number]  : 1940  Date of Evaluation: 3/1/2022  ED Provider: Ronna Ryan PA-C    Chief Complaint       Chief Complaint   Patient presents with    Urinary Retention     c/o trouble urinating    Dysuria     dx with UTI and given ATB yesterday    Chills     c/o chills today       Luci Mckeon is a 80 y.o. female who presents to the emergency department for fever and chills. Patient states symptoms began over the weekend. She saw her PCP yesterday and was started on Cipro for suspected UTI. Symptoms continue today. States fever is \"low grade\"--cannot tell me a measured temperature. Feels like she can't get warm. She denies any cough, congestion, cp, sob. She denies abdominal pain. She has had decreased urine output and associated dysuria. No known sick contacts. She has been vaccinated for COVID and influenza. ROS     CONSTITUTIONAL:  + subjective fever, chills. EYES:  Denies visual changes. HEAD:  Denies headache. ENT:  Denies earache, nasal congestion, sore throat. NECK:  Denies neck pain. RESPIRATORY:  Denies any shortness of breath. CARDIOVASCULAR:  Denies chest pain. GI:  Denies nausea or vomiting. :  + urinary symptoms. MUSCULOSKELETAL:  Denies extremity pain or swelling. BACK:  Denies back pain. INTEGUMENT:  Denies skin changes. LYMPHATIC:  Denies lymphadenopathy. NEUROLOGIC:  Denies any numbness/tingling. PSYCHIATRIC:  Denies SI/HI. Past History     Past Medical History:   Diagnosis Date    COPD (chronic obstructive pulmonary disease) ([facility] [address]      History reviewed. No pertinent surgical history. Social History     Socioeconomic History    Marital status:       Spouse name: None    Number of children: None    Years of education: None    Highest education level: None   Occupational History    None Tobacco Use    Smoking status: Current Some Day Smoker     Packs/day: 0.50     Years: 25.00     Pack years: 12.50     Types: Cigarettes     Start date: 8/30/1984    Smokeless tobacco: Never Used   Substance and Sexual Activity    Alcohol use: Never    Drug use: Never    Sexual activity: None   Other Topics Concern    None   Social History Narrative    None     Social Determinants of Health     Financial Resource Strain:     Difficulty of Paying Living Expenses: Not on file   Food Insecurity:     Worried About Running Out of Food in the Last Year: Not on file    Lucie of Food in the Last Year: Not on file   Transportation Needs:     Lack of Transportation (Medical): Not on file    Lack of Transportation (Non-Medical):  Not on file   Physical Activity:     Days of Exercise per Week: Not on file    Minutes of Exercise per Session: Not on file   Stress:     Feeling of Stress : Not on file   Social Connections:     Frequency of Communication with Friends and Family: Not on file    Frequency of Social Gatherings with Friends and Family: Not on file    Attends [employer] Services: Not on file    Active Member of [address] [place] [address] or Organizations: Not on file    Attends Club or Organization Meetings: Not on file    Marital Status: Not on file   Intimate Partner Violence:     Fear of Current or Ex-Partner: Not on file    Emotionally Abused: Not on file    Physically Abused: Not on file    Sexually Abused: Not on file   Housing Stability:     Unable to Pay for Housing in the Last Year: Not on file    Number of [place] in the Last Year: Not on file    Unstable Housing in the Last Year: Not on file       Medications/Allergies     Previous Medications    ALBUTEROL SULFATE HFA (PROVENTIL HFA) 108 (90 BASE) MCG/ACT INHALER    Inhale 2 puffs into the lungs every 6 hours as needed for Wheezing or Shortness of Breath    ASPIRIN 325 MG TABLET    Take 81 mg by mouth daily     BUDESONIDE-FORMOTEROL (SYMBICORT) 160-4.5 MCG/ACT AERO    Inhale 2 puffs into the lungs 2 times daily    FAMOTIDINE (PEPCID) 20 MG TABLET    Take 1 tablet by mouth daily    GUAIFENESIN (MUCINEX) 600 MG EXTENDED RELEASE TABLET    Take 1 tablet by mouth 2 times daily    IPRATROPIUM-ALBUTEROL (DUONEB) 0.5-2.5 (3) MG/3ML SOLN NEBULIZER SOLUTION    USE 1 VIAL IN HHN 4 TIMES DAILY     No Known Allergies     Physical Exam       ED Triage Vitals [03/01/22 1853]   BP Temp Temp Source Pulse Resp SpO2 Height Weight   (!) 154/69 98.5 °F (36.9 °C) Oral 96 16 96 % 5' 3\" (1.6 m) 167 lb (75.8 kg)     GENERAL APPEARANCE:  Well-developed, well-nourished, no acute distress. HEAD:  NC/AT. EYES:  Sclera anicteric. ENT:  Ears, nose, mouth normal.     NECK:  Supple. CARDIO:  RRR. LUNGS:   CTAB. Respirations unlabored. ABDOMEN:  Soft, non-distended, non-tender. BS active. EXTREMITIES:  No acute deformities. SKIN:  Warm and dry. NEUROLOGICAL:  Alert and oriented. PSYCHIATRIC:  Normal mood.      Diagnostics     Labs:  Results for orders placed or performed during the hospital encounter of 03/01/22   CBC with Auto Differential   Result Value Ref Range    WBC 8.3 4.0 - 10.5 K/CU MM    RBC 4.77 4.2 - 5.4 M/CU MM    Hemoglobin 14.1 12.5 - 16.0 GM/DL    Hematocrit 44.0 37 - 47 %    MCV 92.2 78 - 100 FL    MCH 29.6 27 - 31 PG    MCHC 32.0 32.0 - 36.0 %    RDW 13.1 11.7 - 14.9 %    Platelets 835 (L) 778 - 440 K/CU MM    MPV 10.0 7.5 - 11.1 FL    Immature Neutrophil % 1.9 (H) 0 - 0.43 %    Segs Relative 52.5 36 - 66 %    Eosinophils % 0.4 0 - 3 %    Basophils % 0.1 0 - 1 %    Lymphocytes % 14.4 (L) 24 - 44 %    Monocytes % 30.7 (H) 0 - 4 %    Total Immature Neutrophil 0.16 K/CU MM    Segs Absolute 4.4 K/CU MM    Eosinophils Absolute 0.0 K/CU MM    Basophils Absolute 0.0 K/CU MM    Lymphocytes Absolute 1.2 K/CU MM    Monocytes Absolute 2.5 K/CU MM    Differential Type       AUTOMATED DIFF RESULTS CONFIRMED BY SMEAR REVIEW  AUTOMATED DIFFERENTIAL      Anisocytosis 1+ PLT Morphology FEW LARGE PLATELETS    Comprehensive Metabolic Panel   Result Value Ref Range    Sodium 139 135 - 145 MMOL/L    Potassium 3.8 3.5 - 5.1 MMOL/L    Chloride 104 99 - 110 mMol/L    CO2 23 21 - 32 MMOL/L    BUN 8 6 - 23 MG/DL    CREATININE 0.8 0.6 - 1.1 MG/DL    Glucose 123 (H) 70 - 99 MG/DL    Calcium 8.7 8.3 - 10.6 MG/DL    Albumin 3.8 3.4 - 5.0 GM/DL    Total Protein 6.5 6.4 - 8.2 GM/DL    Total Bilirubin 0.5 0.0 - 1.0 MG/DL    ALT 9 (L) 10 - 40 U/L    AST 13 (L) 15 - 37 IU/L    Alkaline Phosphatase 78 40 - 128 IU/L    GFR Non-African American >60 >60 mL/min/1.73m2    GFR African American >60 >60 mL/min/1.73m2    Anion Gap 12 4 - 16   Urinalysis   Result Value Ref Range    Color, UA YELLOW YELLOW    Clarity, UA CLEAR CLEAR    Glucose, Urine NEGATIVE NEGATIVE MG/DL    Bilirubin Urine NEGATIVE NEGATIVE MG/DL    Ketones, Urine TRACE (A) NEGATIVE MG/DL    Specific Gravity, UA >1.030 1.001 - 1.035    Blood, Urine MODERATE (A) NEGATIVE    pH, Urine 5.0 5.0 - 8.0    Protein, UA TRACE (A) NEGATIVE MG/DL    Urobilinogen, Urine 1.0 0.2 - 1.0 MG/DL    Nitrite Urine, Quantitative NEGATIVE NEGATIVE    Leukocyte Esterase, Urine SMALL (A) NEGATIVE    RBC, UA 9 (H) 0 - 6 /HPF    WBC, UA 9 (H) 0 - 5 /HPF    Bacteria, UA NEGATIVE NEGATIVE /HPF    Squam Epithel, UA 12 /HPF    Mucus, UA FEW (A) NEGATIVE HPF   Lactic Acid   Result Value Ref Range    Lactate 1.3 0.4 - 2.0 mMOL/L       Radiographs:  CT ABDOMEN PELVIS WO CONTRAST Additional Contrast? None    Result Date: 3/2/2022  1. No acute intra-abdominal abnormality to account for the patient's symptoms. 2. Nonobstructing left nephrolithiasis. 3. Severe diverticulosis. 4. Small hiatal hernia. 5. Atherosclerosis. 6. Cholecystectomy.      CT SOFT TISSUE NECK W WO CONTRAST    Result Date: 2/14/2022  EXAMINATION: CT OF THE NECK WITHOUT AND WITH CONTRAST  2/14/2022 TECHNIQUE: CT of the neck was performed without and with the administration of intravenous contrast. Multiplanar reformatted images are provided for review. Dose modulation, iterative reconstruction, and/or weight based adjustment of the mA/kV was utilized to reduce the radiation dose to as low as reasonably achievable. COMPARISON: None. HISTORY: ORDERING SYSTEM PROVIDED HISTORY: Facial swelling TECHNOLOGIST PROVIDED HISTORY: Reason for exam:->right facial swelling, salivary gland obstruction? FINDINGS: The lung apices are clear. The visualized portions of the thyroid gland are grossly within normal limits. No retropharyngeal free fluid is identified. The left parotid gland and submandibular glands are grossly within normal limits. There is asymmetric thickening of the right parotid gland with mild surrounding inflammatory change. This extends caudally to the level of the thyroid cartilage, with thickening of the ipsilateral platysma. No salivary stones are visualized. There is no subcutaneous emphysema or fluid collection. An air-fluid level is noted within the right maxillary antrum. The nasopharynx, hypopharynx, larynx, and upper esophagus are grossly within normal limits. There is asymmetric soft tissue prominence of the right sided eric pharyngeal soft tissues. The apparent asymmetry could be related to a prior contralateral tonsillectomy but correlate with clinical exam to exclude presence of a right-sided oropharyngeal mass. Vascular calcifications are noted. No pathologically enlarged lymph nodes are identified. The patient is edentulous. Mild degenerative changes of the C-spine are noted     Mild right-sided parotitis, of uncertain etiology. Asymmetric prominence of the oropharyngeal soft tissues, on the right-hand side. Correlate with clinical exam to rule out presence of a mass.      XR CHEST PORTABLE    Result Date: 3/1/2022  EXAMINATION: ONE XRAY VIEW OF THE CHEST 3/1/2022 10:07 pm COMPARISON: September 1, 2021 HISTORY: ORDERING SYSTEM PROVIDED HISTORY: fever, chills TECHNOLOGIST PROVIDED HISTORY: Reason for exam:->fever, chills Reason for Exam: fever, chills FINDINGS: No lines or tubes. Stable cardiomediastinal silhouette. Emphysematous changes are present. The lungs are clear without focal consolidation or pleural effusion. No suspicious pulmonary nodules. No pulmonary edema. No pneumothorax. No acute osseous abnormality. 1. No acute cardiopulmonary disease. ED Course and MDM   -  Patient seen and evaluated in the emergency department. -  Triage and nursing notes reviewed and incorporated. -  Old chart records reviewed and incorporated. -  Supervising physician was Dr. Wild Bocanegra. Patient was seen independently. -  Work-up included:  See above  -  ED medications:  IV Rocephin  -  Results discussed with patient and daughter. UA with small leuks, 9 RBCs, 9 WBCs. I did CT abd pelv to ensure no obstructive uropathy. CXR is clear. Negative flu and COVID. Continue Cipro that was initiated yesterday--FU later in the week/early next week to ensure resolution. Return here as needed. -  Disposition:  Home    In light of current events, I did utilize appropriate PPE (including N95 and surgical face mask, safety glasses, and gloves, as recommended by the health facility/national standard best practice, during my bedside interactions with the patient. Final Impression      1. Urinary tract infection without hematuria, site unspecified    2.  [facility], [address]  03/02/22 9972

## 2022-03-02 NOTE — ED NOTES
Reviewed discharge paperwork with pt. Answered all questions. Pt verbalized understanding. Pt wheeled out at this time.         Peggy Ross RN  03/02/22 9869

## 2022-03-03 LAB
CULTURE: ABNORMAL
CULTURE: ABNORMAL
Lab: ABNORMAL
SPECIMEN: ABNORMAL

## 2022-05-06 ENCOUNTER — HOSPITAL ENCOUNTER (OUTPATIENT)
Dept: CT IMAGING | Age: 82
Discharge: HOME | End: 2022-05-06
Payer: MEDICARE

## 2022-05-06 DIAGNOSIS — R91.1 PULMONARY NODULE: ICD-10-CM

## 2022-05-06 PROCEDURE — 71250 CT THORAX DX C-: CPT

## 2022-09-06 ENCOUNTER — HOSPITAL ENCOUNTER (OUTPATIENT)
Age: 82
Discharge: HOME | End: 2022-09-06
Payer: MEDICARE

## 2022-09-06 DIAGNOSIS — J30.2 SEASONAL ALLERGIES: ICD-10-CM

## 2022-09-06 LAB
BANDED NEUTROPHILS ABSOLUTE COUNT: 0.41 K/CU MM
BANDED NEUTROPHILS RELATIVE PERCENT: 6 % (ref 5–11)
DIFFERENTIAL TYPE: ABNORMAL
HCT VFR BLD CALC: 47.5 % (ref 37–47)
HEMOGLOBIN: 15.6 GM/DL (ref 12.5–16)
LYMPHOCYTES ABSOLUTE: 1.6 K/CU MM
LYMPHOCYTES RELATIVE PERCENT: 23 % (ref 24–44)
MCH RBC QN AUTO: 30.1 PG (ref 27–31)
MCHC RBC AUTO-ENTMCNC: 32.8 % (ref 32–36)
MCV RBC AUTO: 91.5 FL (ref 78–100)
MONOCYTES ABSOLUTE: 2 K/CU MM
MONOCYTES RELATIVE PERCENT: 30 % (ref 0–4)
PDW BLD-RTO: 12.9 % (ref 11.7–14.9)
PLATELET # BLD: 135 K/CU MM (ref 140–440)
PMV BLD AUTO: 10.6 FL (ref 7.5–11.1)
RBC # BLD: 5.19 M/CU MM (ref 4.2–5.4)
SEGMENTED NEUTROPHILS ABSOLUTE COUNT: 2.8 K/CU MM
SEGMENTED NEUTROPHILS RELATIVE PERCENT: 41 % (ref 36–66)
WBC # BLD: 6.8 K/CU MM (ref 4–10.5)
WBC # BLD: ABNORMAL 10*3/UL

## 2022-09-06 PROCEDURE — 82785 ASSAY OF IGE: CPT

## 2022-09-06 PROCEDURE — 85027 COMPLETE CBC AUTOMATED: CPT

## 2022-09-06 PROCEDURE — 85007 BL SMEAR W/DIFF WBC COUNT: CPT

## 2022-09-07 LAB — IMMUNOGLOBULIN E: 37 KU/L

## 2022-10-17 ENCOUNTER — OFFICE VISIT (OUTPATIENT)
Dept: FAMILY MEDICINE CLINIC | Facility: CLINIC | Age: 82
End: 2022-10-17

## 2022-10-17 VITALS
SYSTOLIC BLOOD PRESSURE: 130 MMHG | OXYGEN SATURATION: 98 % | HEIGHT: 63 IN | WEIGHT: 160.4 LBS | HEART RATE: 80 BPM | BODY MASS INDEX: 28.42 KG/M2 | DIASTOLIC BLOOD PRESSURE: 78 MMHG | TEMPERATURE: 97.5 F

## 2022-10-17 DIAGNOSIS — F17.200 SMOKER: ICD-10-CM

## 2022-10-17 DIAGNOSIS — J45.909 UNCOMPLICATED ASTHMA, UNSPECIFIED ASTHMA SEVERITY, UNSPECIFIED WHETHER PERSISTENT: ICD-10-CM

## 2022-10-17 DIAGNOSIS — L82.1 SEBORRHEIC KERATOSES: ICD-10-CM

## 2022-10-17 DIAGNOSIS — I73.9 PERIPHERAL ARTERIAL DISEASE: Primary | ICD-10-CM

## 2022-10-17 PROCEDURE — 99204 OFFICE O/P NEW MOD 45 MIN: CPT | Performed by: FAMILY MEDICINE

## 2022-10-17 RX ORDER — ASPIRIN 81 MG/1
81 TABLET ORAL DAILY
COMMUNITY

## 2022-10-17 RX ORDER — FUROSEMIDE 20 MG/1
20 TABLET ORAL DAILY PRN
COMMUNITY

## 2022-10-17 RX ORDER — IBUPROFEN 200 MG
200 TABLET ORAL DAILY PRN
COMMUNITY

## 2022-10-17 RX ORDER — MONTELUKAST SODIUM 10 MG/1
1 TABLET ORAL EVERY MORNING
COMMUNITY
Start: 2022-08-16

## 2022-10-17 RX ORDER — ACETAMINOPHEN 500 MG
500 TABLET ORAL DAILY PRN
COMMUNITY

## 2022-11-04 ENCOUNTER — OFFICE VISIT (OUTPATIENT)
Dept: FAMILY MEDICINE CLINIC | Facility: CLINIC | Age: 82
End: 2022-11-04

## 2022-11-04 VITALS
SYSTOLIC BLOOD PRESSURE: 124 MMHG | HEART RATE: 83 BPM | DIASTOLIC BLOOD PRESSURE: 80 MMHG | HEIGHT: 63 IN | WEIGHT: 159.2 LBS | OXYGEN SATURATION: 99 % | BODY MASS INDEX: 28.21 KG/M2

## 2022-11-04 DIAGNOSIS — R30.0 DYSURIA: Primary | ICD-10-CM

## 2022-11-04 LAB
BILIRUB BLD-MCNC: NEGATIVE MG/DL
CLARITY, POC: ABNORMAL
COLOR UR: ABNORMAL
EXPIRATION DATE: ABNORMAL
GLUCOSE UR STRIP-MCNC: NEGATIVE MG/DL
KETONES UR QL: NEGATIVE
LEUKOCYTE EST, POC: ABNORMAL
Lab: ABNORMAL
NITRITE UR-MCNC: NEGATIVE MG/ML
PH UR: 6 [PH] (ref 5–8)
PROT UR STRIP-MCNC: ABNORMAL MG/DL
RBC # UR STRIP: ABNORMAL /UL
SP GR UR: 1.02 (ref 1–1.03)
UROBILINOGEN UR QL: NORMAL

## 2022-11-04 PROCEDURE — 87186 SC STD MICRODIL/AGAR DIL: CPT | Performed by: NURSE PRACTITIONER

## 2022-11-04 PROCEDURE — 81003 URINALYSIS AUTO W/O SCOPE: CPT | Performed by: NURSE PRACTITIONER

## 2022-11-04 PROCEDURE — 87086 URINE CULTURE/COLONY COUNT: CPT | Performed by: NURSE PRACTITIONER

## 2022-11-04 PROCEDURE — 99213 OFFICE O/P EST LOW 20 MIN: CPT | Performed by: NURSE PRACTITIONER

## 2022-11-04 PROCEDURE — 87077 CULTURE AEROBIC IDENTIFY: CPT | Performed by: NURSE PRACTITIONER

## 2022-11-04 RX ORDER — NITROFURANTOIN 25; 75 MG/1; MG/1
100 CAPSULE ORAL EVERY 12 HOURS SCHEDULED
Qty: 10 CAPSULE | Refills: 0 | Status: SHIPPED | OUTPATIENT
Start: 2022-11-04 | End: 2022-11-07

## 2022-11-04 RX ORDER — ALBUTEROL SULFATE 90 UG/1
AEROSOL, METERED RESPIRATORY (INHALATION)
COMMUNITY
Start: 2022-08-07

## 2022-11-04 NOTE — PROGRESS NOTES
"Chief Complaint  Foot Swelling (Pt states that he right foot is swollen ) and Urinary Tract Infection (Pt states shes having burning sensation when she urinates and also an odor)    Subjective        Lilly Garcia presents to [facility]  History of Present Illness  Pt is here today with concerns of burning with urination. Symptoms were worse yesterday. She drank more water and cranberry juice, and feels like that helped. Urinalysis today with Leuks, protein, and blood. Will send urine for culture.     Objective   Vital Signs:  /80   Pulse 83   Ht 160 cm (62.99\")   Wt 72.2 kg (159 lb 3.2 oz)   SpO2 99%   BMI 28.21 kg/m²   Estimated body mass index is 28.21 kg/m² as calculated from the following:    Height as of this encounter: 160 cm (62.99\").    Weight as of this encounter: 72.2 kg (159 lb 3.2 oz).          Physical Exam  Vitals reviewed.   Constitutional:       Appearance: Normal appearance.   HENT:      Nose: Nose normal.      Mouth/Throat:      Mouth: Mucous membranes are moist.   Eyes:      Conjunctiva/sclera: Conjunctivae normal.   Cardiovascular:      Rate and Rhythm: Normal rate and regular rhythm.      Heart sounds: Normal heart sounds.   Pulmonary:      Effort: Pulmonary effort is normal.      Breath sounds: Normal breath sounds.   Abdominal:      Tenderness: There is no abdominal tenderness. There is no guarding or rebound.   Musculoskeletal:         General: No tenderness. Normal range of motion.   Skin:     General: Skin is warm.   Neurological:      Mental Status: She is alert and oriented to person, place, and time.   Psychiatric:         Mood and Affect: Mood normal.         Behavior: Behavior normal.         Thought Content: Thought content normal.        Result Review :                Assessment and Plan   Diagnoses and all orders for this visit:    1. Dysuria (Primary)  -     POC Urinalysis Dipstick, Automated  -     Urine Culture - Urine, Urine, Clean " Catch; Future  -     nitrofurantoin, macrocrystal-monohydrate, (MACROBID) 100 MG capsule; Take 1 capsule by mouth Every 12 (Twelve) Hours for 5 days.  Dispense: 10 capsule; Refill: 0  -     Urine Culture - Urine, Urine, Clean Catch           Follow Up   No follow-ups on file.  Patient was given instructions and counseling regarding her condition or for health maintenance advice. Please see specific information pulled into the AVS if appropriate.

## 2022-11-06 LAB — BACTERIA SPEC AEROBE CULT: ABNORMAL

## 2022-11-07 ENCOUNTER — OFFICE VISIT (OUTPATIENT)
Dept: PULMONOLOGY | Facility: CLINIC | Age: 82
End: 2022-11-07

## 2022-11-07 VITALS
WEIGHT: 157 LBS | HEART RATE: 75 BPM | BODY MASS INDEX: 27.82 KG/M2 | SYSTOLIC BLOOD PRESSURE: 110 MMHG | OXYGEN SATURATION: 98 % | DIASTOLIC BLOOD PRESSURE: 60 MMHG | HEIGHT: 63 IN | TEMPERATURE: 97.8 F

## 2022-11-07 DIAGNOSIS — R91.1 LUNG NODULE: ICD-10-CM

## 2022-11-07 DIAGNOSIS — J45.909 ASTHMA, UNSPECIFIED ASTHMA SEVERITY, UNSPECIFIED WHETHER COMPLICATED, UNSPECIFIED WHETHER PERSISTENT: Primary | ICD-10-CM

## 2022-11-07 DIAGNOSIS — Z23 IMMUNIZATION DUE: ICD-10-CM

## 2022-11-07 PROCEDURE — 90677 PCV20 VACCINE IM: CPT | Performed by: INTERNAL MEDICINE

## 2022-11-07 PROCEDURE — G0009 ADMIN PNEUMOCOCCAL VACCINE: HCPCS | Performed by: INTERNAL MEDICINE

## 2022-11-07 PROCEDURE — 94729 DIFFUSING CAPACITY: CPT | Performed by: INTERNAL MEDICINE

## 2022-11-07 PROCEDURE — 94060 EVALUATION OF WHEEZING: CPT | Performed by: INTERNAL MEDICINE

## 2022-11-07 PROCEDURE — 99204 OFFICE O/P NEW MOD 45 MIN: CPT | Performed by: INTERNAL MEDICINE

## 2022-11-07 PROCEDURE — 94726 PLETHYSMOGRAPHY LUNG VOLUMES: CPT | Performed by: INTERNAL MEDICINE

## 2022-11-07 RX ORDER — BUDESONIDE AND FORMOTEROL FUMARATE DIHYDRATE 160; 4.5 UG/1; UG/1
2 AEROSOL RESPIRATORY (INHALATION)
COMMUNITY
End: 2022-11-30 | Stop reason: ALTCHOICE

## 2022-11-07 RX ORDER — ALBUTEROL SULFATE 90 UG/1
4 AEROSOL, METERED RESPIRATORY (INHALATION) ONCE
Status: COMPLETED | OUTPATIENT
Start: 2022-11-07 | End: 2022-11-07

## 2022-11-07 RX ADMIN — ALBUTEROL SULFATE 4 PUFF: 90 AEROSOL, METERED RESPIRATORY (INHALATION) at 10:53

## 2022-11-08 NOTE — PROGRESS NOTES
CC:    Shortness of breath    HPI:    81 y/o WF w/ h/o ~40 py smoking (ongoing), COPD no home O2 or CPAP, Allergic Rhinitis, Possible Asthma, who presents today to establish care after recently moving to area.  She has an intermittently productive cough (whitish sputum).  She does get intermittent wheezing, no obvious triggers.  Able to walk around grocery stores, etc without much trouble - more limited by knee pain that SOA.  Had a flare in Feb requiring ER visit, none since then.      INTERVAL HISTORY:        PMH:    Past Medical History:   Diagnosis Date   • Bladder infection    • Rheumatoid arteritis (HCC)      PSH:    Past Surgical History:   Procedure Laterality Date   • CHOLECYSTECTOMY  1989   • HYSTERECTOMY  1976   • TONSILLECTOMY  1964     FH:    Family History   Problem Relation Age of Onset   • Hypertension Mother    • Heart disease Mother    • Diabetes Father    • Heart disease Father    • Cancer Sister    • Cancer Brother    • Hypertension Brother      SH:    Social History     Socioeconomic History   • Marital status:    Tobacco Use   • Smoking status: Every Day     Packs/day: 1.00     Years: 38.00     Pack years: 38.00     Types: Cigarettes   • Smokeless tobacco: Never   Vaping Use   • Vaping Use: Never used   Substance and Sexual Activity   • Alcohol use: Never   • Drug use: Never   • Sexual activity: Defer     ALLERGIES:    No Known Allergies  MEDICATIONS:      Current Outpatient Medications:   •  acetaminophen (TYLENOL) 500 MG tablet, Take 1 tablet by mouth Daily As Needed for Mild Pain., Disp: , Rfl:   •  albuterol sulfate  (90 Base) MCG/ACT inhaler, , Disp: , Rfl:   •  aspirin 81 MG EC tablet, Take 1 tablet by mouth Daily., Disp: , Rfl:   •  budesonide-formoterol (SYMBICORT) 160-4.5 MCG/ACT inhaler, Inhale 2 puffs 2 (Two) Times a Day., Disp: , Rfl:   •  Fluticasone-Umeclidin-Vilant (Trelegy Ellipta) 100-62.5-25 MCG/INH inhaler, Inhale 1 puff., Disp: , Rfl:   •  furosemide (LASIX)  20 MG tablet, Take 1 tablet by mouth Daily As Needed., Disp: , Rfl:   •  ibuprofen (ADVIL,MOTRIN) 200 MG tablet, Take 1 tablet by mouth Daily As Needed for Mild Pain., Disp: , Rfl:   •  montelukast (SINGULAIR) 10 MG tablet, Take 1 tablet by mouth Every Morning., Disp: , Rfl:   No current facility-administered medications for this visit.    ROS:  Per HPI    DIAGNOSTIC DATA (Reviewed and interpreted by me unless otherwise specified):    CXR 11/7/22 - Trachea is midline.  Cardiomediastinal silhouette is within normal limits.  The lungs are hyperinflated without discreet mass, consolidation, effusion or pneumothorax. There are emphysematous and scattered granulomatous changes.    PFT 11/7/22 -  Mild obstruction, no change w/ BD, hyperinflation, air trapping, mild dec DLCO corrects for alveolar volume    Referring Provider records were reviewed    Vitals:    11/07/22 1049   BP: 110/60   Pulse: 75   Temp: 97.8 °F (36.6 °C)   SpO2: 98%       Physical Exam:    Constitutional: Alert, no Distress  Mouth/Throat: Oropharynx is clear and moist.   Cardiovascular: Normal rate, regular rhythm, normal heart sounds.   Pulmonary/Chest: Effort normal and breath sounds normal.  No clubbing.     Assessment & Plan     COPD 1B  Possible Asthma  Allergic Rhinitis  Ongoing Tob Abuse ~40 py  Pulmonary Nodules (previously followed @ OSH)  Recommend Trelegy 100 daily, and albuterol MDI / Neb prn.      Continue antihistamine for allergies.  Add flonase prn.    Counseled patient on smoking cessation, recommend NRT.    She qualifies for lung cancer screening and is agreeable.  Followed for pulmonary nodules at prior institution (request images).  Report from last CT 5/6/22 showed a 4 mm nodule along right minor fissure.  She can wait to have next  CT May 2023 to follow up 4 mm nodule and for screening purposes.    Had flu shot    Give prevnar 20 today    RTC in May 2023 w/ LDCT, [place], 6MW    Darin Dunn MD  Pulmonology and Critical Care  Medicine  11/08/22 09:34 EST  Electronically Signed    C.C.:    Damian Carlton DO, Damian Carlton, DO

## 2022-11-30 DIAGNOSIS — F17.200 SMOKER: Primary | ICD-10-CM

## 2023-01-12 ENCOUNTER — LAB (OUTPATIENT)
Dept: LAB | Facility: HOSPITAL | Age: 83
End: 2023-01-12
Payer: MEDICARE

## 2023-01-12 ENCOUNTER — OFFICE VISIT (OUTPATIENT)
Dept: FAMILY MEDICINE CLINIC | Facility: CLINIC | Age: 83
End: 2023-01-12
Payer: MEDICARE

## 2023-01-12 VITALS
DIASTOLIC BLOOD PRESSURE: 78 MMHG | TEMPERATURE: 96.9 F | HEART RATE: 86 BPM | OXYGEN SATURATION: 98 % | BODY MASS INDEX: 29.97 KG/M2 | WEIGHT: 169.2 LBS | SYSTOLIC BLOOD PRESSURE: 118 MMHG

## 2023-01-12 DIAGNOSIS — J12.82 PNEUMONIA DUE TO COVID-19 VIRUS: ICD-10-CM

## 2023-01-12 DIAGNOSIS — U07.1 PNEUMONIA DUE TO COVID-19 VIRUS: Primary | ICD-10-CM

## 2023-01-12 DIAGNOSIS — F17.200 SMOKER: ICD-10-CM

## 2023-01-12 DIAGNOSIS — J12.82 PNEUMONIA DUE TO COVID-19 VIRUS: Primary | ICD-10-CM

## 2023-01-12 DIAGNOSIS — U07.1 PNEUMONIA DUE TO COVID-19 VIRUS: ICD-10-CM

## 2023-01-12 LAB
DEPRECATED RDW RBC AUTO: 40.4 FL (ref 37–54)
ERYTHROCYTE [DISTWIDTH] IN BLOOD BY AUTOMATED COUNT: 12.9 % (ref 12.3–15.4)
HCT VFR BLD AUTO: 43.8 % (ref 34–46.6)
HGB BLD-MCNC: 15.2 G/DL (ref 12–15.9)
MCH RBC QN AUTO: 29.4 PG (ref 26.6–33)
MCHC RBC AUTO-ENTMCNC: 34.7 G/DL (ref 31.5–35.7)
MCV RBC AUTO: 84.7 FL (ref 79–97)
NRBC BLD AUTO-RTO: 0 /100 WBC (ref 0–0.2)
PLATELET # BLD AUTO: 169 10*3/MM3 (ref 140–450)
PMV BLD AUTO: 10.9 FL (ref 6–12)
RBC # BLD AUTO: 5.17 10*6/MM3 (ref 3.77–5.28)
WBC NRBC COR # BLD: 15.21 10*3/MM3 (ref 3.4–10.8)

## 2023-01-12 PROCEDURE — 99214 OFFICE O/P EST MOD 30 MIN: CPT | Performed by: FAMILY MEDICINE

## 2023-01-12 PROCEDURE — 85007 BL SMEAR W/DIFF WBC COUNT: CPT

## 2023-01-12 PROCEDURE — 80048 BASIC METABOLIC PNL TOTAL CA: CPT

## 2023-01-12 PROCEDURE — 85025 COMPLETE CBC W/AUTO DIFF WBC: CPT

## 2023-01-12 RX ORDER — DEXTROMETHORPHAN HYDROBROMIDE AND PROMETHAZINE HYDROCHLORIDE 15; 6.25 MG/5ML; MG/5ML
2.5 SYRUP ORAL NIGHTLY PRN
Qty: 118 ML | Refills: 0 | Status: SHIPPED | OUTPATIENT
Start: 2023-01-12 | End: 2023-01-18

## 2023-01-12 RX ORDER — NICOTINE 21 MG/24HR
1 PATCH, TRANSDERMAL 24 HOURS TRANSDERMAL EVERY 24 HOURS
Qty: 14 PATCH | Refills: 0 | Status: SHIPPED | OUTPATIENT
Start: 2023-01-12 | End: 2023-01-26

## 2023-01-12 RX ORDER — DEXAMETHASONE 6 MG/1
6 TABLET ORAL DAILY
COMMUNITY
Start: 2023-01-11 | End: 2023-01-12

## 2023-01-12 NOTE — PROGRESS NOTES
"Established Patient Office Visit      Patient Name: Lilly Garcia  : 1940   MRN: [id number]   Care Team: Patient Care Team:  Damian Carlton DO as PCP - General (Family Medicine)  Darin Dunn MD as Consulting Physician (Pulmonary Disease)    Chief Complaint:    Chief Complaint   Patient presents with   • Follow-up     Hospital follow-up for pneumonia   • Pneumonia   • Hypertension       History of Present Illness: Lilly Garcia is a 82 y.o. female who is here today for chief complaint.    HPI    She is accompanied by an adult female who contributes to the history of their care.    The patient is a pleasant 89-year-old female who presents today for a patient initiated visit for \"high blood pressure\". It appears she was recently in the emergency room at [facility] for chronic obstructive pulmonary disease exacerbation and COVID-19 pneumonia. She was initially seen there on 2022 for COVID-19 and chronic obstructive pulmonary disease. She finished her medications on Friday, 2023, and returned to the emergency room on 2023 with returning symptoms with proceeding in 2 days. She was admitted to the hospital for further evaluation and treatment for her hypoxic respiratory failure and severe sepsis. At the time of admission, her initial oxygen saturation was 88 percent, improving to 96 percent on 2 liters, WBC 24, platelet 136. She improved with IV antibiotics, dexamethasone, and breathing treatment. She eventually weaned off her oxygen. A 6-minute walk test was performed, and she was able to maintain her oxygen. She was discharged home on an oral antibiotic and steroid. It appears she was discharged home just yesterday morning.    The patient reports that she was just discharged from the hospital. She states that she is unable to sleep, and she gets weak and cannot sleep. She reports that she has not slept in 24 hours. She reports that she lays down and closes " her eyes, but she is not asleep. She reports that she has trouble breathing when she lays down, but it is not bad. She reports that she has not checked her oxygen when she lays down since she has been home. She reports that she has to find her thing to check her oxygen at home. She reports that she has an appointment with her pulmonologist on 02/03/2023. The adult female reports that she is still wanting to smoke. She reports that she is not smoking much right now. She reports that she normally smokes 1 pack of cigarettes per day. She reports that if it is raining, she smokes a half a pack of cigarettes per day. She reports that she did have trouble sleeping before she went into the hospital.     She reports that she was getting better, but then all of a sudden the pneumonia must have set in, and she could not sleep. She reports that she was put on amoxicillin. She reports that she was not sent home with dexamethasone. She reports that she took the COVID-19 medication on 12/30/2022. She reports that she went to urgent care on 12/30/2022, and was diagnosed with COVID-19. She reports that she was sent home with the antibiotics and steroids. She reports that she finished the steroid on 01/06/2023. She reports that she was up all night when she finished the last steroid, and could not lay down or sit up. She reports that on 01/09/2023, it started getting worse, and that is when she went back to urgent care. She reports that she is no longer taking the Lasix. She reports that she keeps them in case her feet swell. She reports that it does not take much to make her drowsy. She reports that she cannot take Benadryl, or she sleeps for 3 days. She reports that she has never taken promethazine or Phenergan. She reports that she is feeling short of breath at times.    The following portions of the patient's history were reviewed and updated as appropriate: allergies, current medications, past family history, past medical  history, past social history, past surgical history and problem list.    Subjective      Review of Systems:   Review of Systems - See HPI    Past Medical History:   Past Medical History:   Diagnosis Date   • Bladder infection    • Rheumatoid arteritis (HCC)        Past Surgical History:   Past Surgical History:   Procedure Laterality Date   • CHOLECYSTECTOMY  1989   • HYSTERECTOMY  1976   • TONSILLECTOMY  1964       Family History:   Family History   Problem Relation Age of Onset   • Hypertension Mother    • Heart disease Mother    • Diabetes Father    • Heart disease Father    • Cancer Sister    • Cancer Brother    • Hypertension Brother        Social History:   Social History     Socioeconomic History   • Marital status:    Tobacco Use   • Smoking status: Every Day     Packs/day: 1.00     Years: 38.00     Pack years: 38.00     Types: Cigarettes   • Smokeless tobacco: Never   Vaping Use   • Vaping Use: Never used   Substance and Sexual Activity   • Alcohol use: Never   • Drug use: Never   • Sexual activity: Defer       Tobacco History:   Social History     Tobacco Use   Smoking Status Every Day   • Packs/day: 1.00   • Years: 38.00   • Pack years: 38.00   • Types: Cigarettes   Smokeless Tobacco Never       Medications:     Current Outpatient Medications:   •  acetaminophen (TYLENOL) 500 MG tablet, Take 1 tablet by mouth Daily As Needed for Mild Pain., Disp: , Rfl:   •  albuterol sulfate  (90 Base) MCG/ACT inhaler, , Disp: , Rfl:   •  aspirin 81 MG EC tablet, Take 1 tablet by mouth Daily., Disp: , Rfl:   •  Fluticasone-Umeclidin-Vilant (Trelegy Ellipta) 100-62.5-25 MCG/ACT inhaler, Inhale 1 puff Daily., Disp: 60 each, Rfl: 3  •  furosemide (LASIX) 20 MG tablet, Take 1 tablet by mouth Daily As Needed., Disp: , Rfl:   •  ibuprofen (ADVIL,MOTRIN) 200 MG tablet, Take 1 tablet by mouth Daily As Needed for Mild Pain., Disp: , Rfl:   •  montelukast (SINGULAIR) 10 MG tablet, Take 1 tablet by mouth Every  Morning., Disp: , Rfl:   •  nicotine (Nicoderm CQ) 14 MG/24HR patch, Place 1 patch on the skin as directed by provider Daily for 14 days. Then 7mg patch, Disp: 14 patch, Rfl: 0  •  nicotine (Nicoderm CQ) 7 MG/24HR patch, Place 1 patch on the skin as directed by provider Daily for 14 days., Disp: 14 patch, Rfl: 0  •  promethazine-dextromethorphan (PROMETHAZINE-DM) 6.25-15 MG/5ML syrup, Take 2.5 mL by mouth At Night As Needed for Cough., Disp: 118 mL, Rfl: 0    Allergies:   No Known Allergies    Objective   Objective     Physical Exam:  Vital Signs:   Vitals:    01/12/23 1524   BP: 118/78   BP Location: Left arm   Patient Position: Sitting   Cuff Size: Adult   Pulse: 86   Temp: 96.9 °F (36.1 °C)   TempSrc: Infrared   SpO2: 98%   Weight: 76.7 kg (169 lb 3.2 oz)     Body mass index is 29.97 kg/m².     Physical Exam  Const: NAD, A & Ox4, Pleasant, Cooperative  Eyes: EOMI, no conjunctivitis  ENT: No nasal discharge present, neck supple  Cardiac: Regular rate and rhythm, no cyanosis  Resp: Respiratory rate within normal limits, no increased work of breathing, no audible wheezing or retractions noted  GI: No distention or ascites  MSK: Motor and sensation grossly intact in bilateral upper extremities  Neurologic: CN II-XII grossly intact  Psych: Appropriate mood and behavior.  Skin: Warm, dry  Procedures/Radiology     Procedures  XR Chest 1 View    Result Date: 1/9/2023  Right lung base pneumonia. Images personally reviewed, interpreted and dictated by GRETCHEN Soriano M.D.    CT Angiogram Chest    Result Date: 1/9/2023  1. No pulmonary embolism. 2. Right middle lobe and lower lobe consolidation and scarring. Images reviewed, interpreted, and dictated by Dr. Serjio Krishna. Transcribed by Kylah Mcneal PA-C.       Assessment & Plan   Assessment / Plan      Assessment/Plan:   Problems Addressed This Visit  Diagnoses and all orders for this visit:    1. Pneumonia due to COVID-19 virus (Primary)  -      promethazine-dextromethorphan (PROMETHAZINE-DM) 6.25-15 MG/5ML syrup; Take 2.5 mL by mouth At Night As Needed for Cough.  Dispense: 118 mL; Refill: 0  -     Basic Metabolic Panel; Future  -     CBC & Differential; Future    2. Smoker  Comments:  I will send in a prescription for nicotine patch.  Orders:  -     nicotine (Nicoderm CQ) 14 MG/24HR patch; Place 1 patch on the skin as directed by provider Daily for 14 days. Then 7mg patch  Dispense: 14 patch; Refill: 0  -     nicotine (Nicoderm CQ) 7 MG/24HR patch; Place 1 patch on the skin as directed by provider Daily for 14 days.  Dispense: 14 patch; Refill: 0      Problem List Items Addressed This Visit        Tobacco    Smoker    Overview     1 ppd since 1984         Relevant Medications    nicotine (Nicoderm CQ) 14 MG/24HR patch    nicotine (Nicoderm CQ) 7 MG/24HR patch   Other Visit Diagnoses     Pneumonia due to COVID-19 virus    -  Primary    Relevant Medications    promethazine-dextromethorphan (PROMETHAZINE-DM) 6.25-15 MG/5ML syrup    Other Relevant Orders    Basic Metabolic Panel    CBC & Differential          Pneumonia.  Her lungs sound good today, no increased work of breathing. I will check labs today. She is having difficulty sleeping, which I think is to some degree due to coming off the steroid and some of just being sick. I recommended promethazine DM cough syrup, precautions given. She will follow up with me next Wednesday as scheduled. She is asked her to check her oxygen level and the goal is for it to be over 93 percent.     Patient Instructions   1. Labs today  2. Check your oxygen level before bed and at least once during the day, goal >93%  3. Nighttime medication for sleep/cough      Follow Up:   No follow-ups on file.    DO NICO Rodarte RD  [facility]  [address]  [facility] [address]  Fax [phone]  Phone [phone]       Transcribed from ambient dictation  for Damian Carlton, DO by Leslee Negro.  01/12/23   16:54 EST    Patient or patient representative verbalized consent to the visit recording.  I have personally performed the services described in this document as transcribed by the above individual, and it is both accurate and complete.

## 2023-01-12 NOTE — PATIENT INSTRUCTIONS
Labs today  Check your oxygen level before bed and at least once during the day, goal >93%  Nighttime medication for sleep/cough

## 2023-01-13 LAB
ANION GAP SERPL CALCULATED.3IONS-SCNC: 9.4 MMOL/L (ref 5–15)
BUN SERPL-MCNC: 24 MG/DL (ref 8–23)
BUN/CREAT SERPL: 26.7 (ref 7–25)
CALCIUM SPEC-SCNC: 9.3 MG/DL (ref 8.6–10.5)
CHLORIDE SERPL-SCNC: 100 MMOL/L (ref 98–107)
CO2 SERPL-SCNC: 32.6 MMOL/L (ref 22–29)
CREAT SERPL-MCNC: 0.9 MG/DL (ref 0.57–1)
EGFRCR SERPLBLD CKD-EPI 2021: 64.8 ML/MIN/1.73
GLUCOSE SERPL-MCNC: 98 MG/DL (ref 65–99)
LYMPHOCYTES # BLD MANUAL: 4.05 10*3/MM3 (ref 0.7–3.1)
LYMPHOCYTES NFR BLD MANUAL: 17.3 % (ref 5–12)
MONOCYTES # BLD: 2.63 10*3/MM3 (ref 0.1–0.9)
MYELOCYTES NFR BLD MANUAL: 2 % (ref 0–0)
NEUTROPHILS # BLD AUTO: 8.23 10*3/MM3 (ref 1.7–7)
NEUTROPHILS NFR BLD MANUAL: 52 % (ref 42.7–76)
NEUTS BAND NFR BLD MANUAL: 2 % (ref 0–5)
PLAT MORPH BLD: NORMAL
POTASSIUM SERPL-SCNC: 4.2 MMOL/L (ref 3.5–5.2)
RBC MORPH BLD: NORMAL
SODIUM SERPL-SCNC: 142 MMOL/L (ref 136–145)
VARIANT LYMPHS NFR BLD MANUAL: 23.5 % (ref 19.6–45.3)
VARIANT LYMPHS NFR BLD MANUAL: 3.1 % (ref 0–5)
WBC MORPH BLD: NORMAL

## 2023-01-18 ENCOUNTER — OFFICE VISIT (OUTPATIENT)
Dept: FAMILY MEDICINE CLINIC | Facility: CLINIC | Age: 83
End: 2023-01-18
Payer: MEDICARE

## 2023-01-18 ENCOUNTER — LAB (OUTPATIENT)
Dept: LAB | Facility: HOSPITAL | Age: 83
End: 2023-01-18
Payer: MEDICARE

## 2023-01-18 VITALS
OXYGEN SATURATION: 96 % | WEIGHT: 156.2 LBS | SYSTOLIC BLOOD PRESSURE: 118 MMHG | HEART RATE: 95 BPM | DIASTOLIC BLOOD PRESSURE: 78 MMHG | TEMPERATURE: 97.5 F | BODY MASS INDEX: 27.67 KG/M2

## 2023-01-18 DIAGNOSIS — E55.9 VITAMIN D DEFICIENCY: ICD-10-CM

## 2023-01-18 DIAGNOSIS — U07.1 PNEUMONIA DUE TO COVID-19 VIRUS: Primary | ICD-10-CM

## 2023-01-18 DIAGNOSIS — R53.83 LETHARGY: ICD-10-CM

## 2023-01-18 DIAGNOSIS — F17.200 SMOKER: ICD-10-CM

## 2023-01-18 DIAGNOSIS — Z13.29 SCREENING FOR ENDOCRINE DISORDER: ICD-10-CM

## 2023-01-18 DIAGNOSIS — J12.82 PNEUMONIA DUE TO COVID-19 VIRUS: Primary | ICD-10-CM

## 2023-01-18 LAB
25(OH)D3 SERPL-MCNC: 17.8 NG/ML (ref 30–100)
FOLATE SERPL-MCNC: 8.27 NG/ML (ref 4.78–24.2)
TSH SERPL DL<=0.05 MIU/L-ACNC: 3.83 UIU/ML (ref 0.27–4.2)
VIT B12 BLD-MCNC: 346 PG/ML (ref 211–946)

## 2023-01-18 PROCEDURE — 84443 ASSAY THYROID STIM HORMONE: CPT

## 2023-01-18 PROCEDURE — 82306 VITAMIN D 25 HYDROXY: CPT

## 2023-01-18 PROCEDURE — 82607 VITAMIN B-12: CPT

## 2023-01-18 PROCEDURE — 1111F DSCHRG MED/CURRENT MED MERGE: CPT | Performed by: FAMILY MEDICINE

## 2023-01-18 PROCEDURE — 82746 ASSAY OF FOLIC ACID SERUM: CPT

## 2023-01-18 PROCEDURE — 99214 OFFICE O/P EST MOD 30 MIN: CPT | Performed by: FAMILY MEDICINE

## 2023-01-18 RX ORDER — ASCORBIC ACID 500 MG
500 TABLET ORAL DAILY
Qty: 90 TABLET | Refills: 3 | Status: SHIPPED | OUTPATIENT
Start: 2023-01-18

## 2023-01-18 NOTE — PROGRESS NOTES
Hospital Follow-Up/Transition of Care Visit     Patient Name: Lilly Garcia  : 1940   MRN: [id number]   Care Team: Patient Care Team:  Damian Carlton DO as PCP - General (Family Medicine)  Darin Dunn MD as Consulting Physician (Pulmonary Disease)    Chief Complaint:    Chief Complaint   Patient presents with   • Follow-up     Follow-up after pneumonia   • Pneumonia       History of Present Illness: Lilly Garcia is a 82 y.o. female who presents today for TCM visit.    Within 48 business hours after discharge our office contacted her via telephone to coordinate her care and needs.      I reviewed and discussed the details of that call along with the discharge summary, hospital problems, inpatient lab results, inpatient diagnostic studies, and consultation reports with Lilly.      Current outpatient and discharge medications have been reconciled for the patient.  Reviewed by: Damain Carlton DO      No flowsheet data found.  Risk for Readmission (LACE) No data recorded    History of Present Illness   Course During Hospital Stay:    Lilly Garcia is an 82-year-old female with a history of COPD and continued tobacco abuse who presented to [facility] emergency department with worsening shortness of breath and cough. The patient reports she began to feel poorly on around , initially with a sore throat, nasal congestion, and cough. She states she tested positive for COVID-19 on , and was given a prescription for steroids and Paxlovid. She reports initially her symptoms improved, but again began to feel poorly this past Friday. She reports having a bad headache, nasal congestion, and over the course of the weekend developed worsening shortness of breath and fever. This prompted her to go to the emergency department for evaluation, where upon arrival she was found to meet sepsis criteria with tachycardia, tachypnea, and hypoxia. She had a chest  x-ray done which showed a right lower lobe infiltrate. Her initial O2 saturation was 88%, improving to 96% on 2 L. Her lab work was significant for leukocytosis with a white blood cell count of 24, platelet count of 136, low potassium at 3.3. Admission to the hospital was requested for further evaluation and treatment of hypoxic respiratory failure and severe sepsis.  Patient respiratory status improved with IV antibiotics and dexamethasone and breathing treatment, oxygen requirement slowly weaned off, on the day of discharge patient was on room air, 6-minute walk was performed and patient maintained her oxygen saturation in the mid 90s, patient leukocytosis improving most likely steroid-induced, patient will be discharged home on oral antibiotic and steroid, patient was counseled to quit smoking, patient was hemodynamically stable at time of discharge.      Status Since Discharge:  The patient is accompanied by an adult female who contributes to the history of their care.    The patient reports that she is still weak. She states that she slept all night last night without any pain. She notes that her breathing has been good. She reports that she checked her oxygen saturation at 95 percent. She adds that the syrup helped. She reports that she could not take it for 1 time. The adult female reports that she got the patient some orange juice, but she ended up having a lot of diarrhea with it. She reports that she is using her inhaler every day. She adds that she is a little bit dehydrated. She notes that she has been drinking a lot of water. She admits that she has tried eating.    She reports that she has not received a call from her gynecologist or dermatologist.    The patient notes that she finally received the patches to help her with smoking cessation.       The following portions of the patient's history were reviewed and updated as appropriate: allergies, current medications, past family history, past medical  history, past social history, past surgical history and problem list.    This patient is accompanied by adult female who contributes to the history of their care.    The following portions of the patient's history were reviewed and updated as appropriate: allergies, current medications, past family history, past medical history, past social history, past surgical history and problem list.    Subjective      Review of Systems:   Review of Systems - See HPI    Past Medical History:   Past Medical History:   Diagnosis Date   • Bladder infection    • Rheumatoid arteritis (HCC)        Past Surgical History:   Past Surgical History:   Procedure Laterality Date   • CHOLECYSTECTOMY  1989   • HYSTERECTOMY  1976   • TONSILLECTOMY  1964       Family History:   Family History   Problem Relation Age of Onset   • Hypertension Mother    • Heart disease Mother    • Diabetes Father    • Heart disease Father    • Cancer Sister    • Cancer Brother    • Hypertension Brother        Social History:   Social History     Socioeconomic History   • Marital status:    Tobacco Use   • Smoking status: Every Day     Packs/day: 1.00     Years: 38.00     Pack years: 38.00     Types: Cigarettes   • Smokeless tobacco: Never   Vaping Use   • Vaping Use: Never used   Substance and Sexual Activity   • Alcohol use: Never   • Drug use: Never   • Sexual activity: Defer       Tobacco History:   Social History     Tobacco Use   Smoking Status Every Day   • Packs/day: 1.00   • Years: 38.00   • Pack years: 38.00   • Types: Cigarettes   Smokeless Tobacco Never       Medications:     Current Outpatient Medications:   •  acetaminophen (TYLENOL) 500 MG tablet, Take 1 tablet by mouth Daily As Needed for Mild Pain., Disp: , Rfl:   •  albuterol sulfate  (90 Base) MCG/ACT inhaler, , Disp: , Rfl:   •  aspirin 81 MG EC tablet, Take 1 tablet by mouth Daily., Disp: , Rfl:   •  Fluticasone-Umeclidin-Vilant (Trelegy Ellipta) 100-62.5-25 MCG/ACT inhaler,  Inhale 1 puff Daily., Disp: 60 each, Rfl: 3  •  furosemide (LASIX) 20 MG tablet, Take 1 tablet by mouth Daily As Needed., Disp: , Rfl:   •  ibuprofen (ADVIL,MOTRIN) 200 MG tablet, Take 1 tablet by mouth Daily As Needed for Mild Pain., Disp: , Rfl:   •  montelukast (SINGULAIR) 10 MG tablet, Take 1 tablet by mouth Every Morning., Disp: , Rfl:   •  nicotine (Nicoderm CQ) 14 MG/24HR patch, Place 1 patch on the skin as directed by provider Daily for 14 days. Then 7mg patch, Disp: 14 patch, Rfl: 0  •  nicotine (Nicoderm CQ) 7 MG/24HR patch, Place 1 patch on the skin as directed by provider Daily for 14 days., Disp: 14 patch, Rfl: 0  •  vitamin C (ASCORBIC ACID) 500 MG tablet, Take 1 tablet by mouth Daily., Disp: 90 tablet, Rfl: 3    Allergies:   No Known Allergies    Objective   Objective     Physical Exam:  Vital Signs:   Vitals:    01/18/23 0935   BP: 118/78   BP Location: Left arm   Patient Position: Sitting   Cuff Size: Adult   Pulse: 95   Temp: 97.5 °F (36.4 °C)   TempSrc: Infrared   SpO2: 96%   Weight: 70.9 kg (156 lb 3.2 oz)     Body mass index is 27.67 kg/m².     Physical Exam  Nursing note reviewed  Const: NAD, A&Ox4, Pleasant, Cooperative  Eyes: EOMI, no conjunctivitis  ENT: No nasal discharge present, neck supple  Cardiac: Regular rate and rhythm, no cyanosis  Resp: Respiratory rate within normal limits, no increased work of breathing, no audible wheezing or retractions noted  GI: No distention or ascites  MSK: Motor and sensation grossly intact in bilateral upper extremities  Neurologic: CN II-XII grossly intact  Psych: Appropriate mood and behavior.  Skin: Warm, dry  Procedures/Radiology     Procedures  No radiology results for the last 7 days     Assessment & Plan   Assessment / Plan      Assessment/Plan:   Problems Addressed This Visit  Diagnoses and all orders for this visit:    1. Pneumonia due to COVID-19 virus (Primary)    2. Smoker  Assessment & Plan:  Will start patches tomorrow      3. Screening for  endocrine disorder    4. Vitamin D deficiency  -     Vitamin D,25-Hydroxy; Future    5. Lethargy  -     Vitamin B12; Future  -     Folate; Future  -     TSH Rfx On Abnormal To Free T4; Future    Other orders  -     vitamin C (ASCORBIC ACID) 500 MG tablet; Take 1 tablet by mouth Daily.  Dispense: 90 tablet; Refill: 3    Problem List Items Addressed This Visit        Tobacco    Smoker    Overview     1 ppd since 1984         Current Assessment & Plan     Will start patches tomorrow        Other Visit Diagnoses     Pneumonia due to COVID-19 virus    -  Primary    Screening for endocrine disorder        Vitamin D deficiency        Relevant Orders    Vitamin D,25-Hydroxy (Completed)    Lethargy        Relevant Orders    Vitamin B12 (Completed)    Folate (Completed)    TSH Rfx On Abnormal To Free T4 (Completed)          1. Pneumonia due to COVID-19.  - Improving well, essentially resolved at this point from a symptomatic standpoint. She still feels a little weak, but is improving. Her hydration status is still low, not eating much, and is trying to drink plenty of water. I recommended that she drink fluids with electrolytes and small amounts of sugar like Pedialyte to improve her hydration.    2. Lethargy.  - She has a history of elevated TSH in 2019, may have been just due to euthyroid sick syndrome at the time. I recommend rechecking today along with B12 and folate.      See patient diagnoses and orders along with patient instructions for assessment, plan, and changes to care for patient.    There are no Patient Instructions on file for this visit.    Follow Up:   Return in about 3 months (around 4/18/2023).      MDM     [facility] [address]  [facility]  [address]  [facility] [address]  Fax [phone]  Phone [phone]         Transcribed from ambient dictation for Damian Carlton,  by Jocy Lyon.  01/18/23   11:08 EST    Patient or patient representative  verbalized consent to the visit recording.  I have personally performed the services described in this document as transcribed by the above individual, and it is both accurate and complete.

## 2023-02-17 ENCOUNTER — TELEPHONE (OUTPATIENT)
Dept: FAMILY MEDICINE CLINIC | Facility: CLINIC | Age: 83
End: 2023-02-17
Payer: MEDICARE

## 2023-02-17 NOTE — TELEPHONE ENCOUNTER
Caller: Lilly Garcia    Relationship: Self    Best call back number: [phone]    What is the medical concern/diagnosis: LEGS-HAS CLOSED VEINS IN LEGS FROM PREVIOUS SURGERY    What specialty or service is being requested: VASCULAR DOCTOR

## 2023-02-23 ENCOUNTER — OFFICE VISIT (OUTPATIENT)
Dept: FAMILY MEDICINE CLINIC | Facility: CLINIC | Age: 83
End: 2023-02-23
Payer: MEDICARE

## 2023-02-23 VITALS
OXYGEN SATURATION: 98 % | WEIGHT: 162.4 LBS | SYSTOLIC BLOOD PRESSURE: 98 MMHG | HEART RATE: 85 BPM | TEMPERATURE: 97.7 F | BODY MASS INDEX: 28.77 KG/M2 | DIASTOLIC BLOOD PRESSURE: 62 MMHG

## 2023-02-23 DIAGNOSIS — I73.9 PERIPHERAL ARTERIAL DISEASE: Primary | ICD-10-CM

## 2023-02-23 DIAGNOSIS — N39.45 CONTINUOUS LEAKAGE OF URINE: ICD-10-CM

## 2023-02-23 PROCEDURE — 99213 OFFICE O/P EST LOW 20 MIN: CPT | Performed by: FAMILY MEDICINE

## 2023-02-23 RX ORDER — SOLIFENACIN SUCCINATE 10 MG/1
10 TABLET, FILM COATED ORAL DAILY
Qty: 30 TABLET | Refills: 0 | Status: SHIPPED | OUTPATIENT
Start: 2023-02-23

## 2023-02-23 NOTE — PROGRESS NOTES
Established Patient Office Visit      Patient Name: Lilly Garcia  : 1940   MRN: [id number]   Care Team: Patient Care Team:  Damian Carlton DO as PCP - General (Family Medicine)  Darin Dunn MD as Consulting Physician (Pulmonary Disease)    Chief Complaint:    Chief Complaint   Patient presents with   • Tingling     Pt co tingling in both legs       History of Present Illness: Lilly Garcia is a 82 y.o. female who is here today for chief complaint.    HPI    She was previously seen in October when she established care for peripheral arterial disease and was referred to vascular surgery at that time.  Her information was faxed to [facility] on 2022.    This patient is accompanied by their self who contributes to the history of their care.    The following portions of the patient's history were reviewed and updated as appropriate: allergies, current medications, past family history, past medical history, past social history, past surgical history and problem list.    Subjective      Review of Systems:   Review of Systems - See HPI    Past Medical History:   Past Medical History:   Diagnosis Date   • Bladder infection    • Rheumatoid arteritis (HCC)        Past Surgical History:   Past Surgical History:   Procedure Laterality Date   • CHOLECYSTECTOMY     • HYSTERECTOMY     • TONSILLECTOMY  1964       Family History:   Family History   Problem Relation Age of Onset   • Hypertension Mother    • Heart disease Mother    • Diabetes Father    • Heart disease Father    • Cancer Sister    • Cancer Brother    • Hypertension Brother        Social History:   Social History     Socioeconomic History   • Marital status:    Tobacco Use   • Smoking status: Every Day     Packs/day: 1.00     Years: 38.00     Pack years: 38.00     Types: Cigarettes   • Smokeless tobacco: Never   Vaping Use   • Vaping Use: Never used   Substance and Sexual Activity   • Alcohol use: Never    • Drug use: Never   • Sexual activity: Defer       Tobacco History:   Social History     Tobacco Use   Smoking Status Every Day   • Packs/day: 1.00   • Years: 38.00   • Pack years: 38.00   • Types: Cigarettes   Smokeless Tobacco Never       Medications:     Current Outpatient Medications:   •  acetaminophen (TYLENOL) 500 MG tablet, Take 1 tablet by mouth Daily As Needed for Mild Pain., Disp: , Rfl:   •  albuterol sulfate  (90 Base) MCG/ACT inhaler, , Disp: , Rfl:   •  aspirin 81 MG EC tablet, Take 1 tablet by mouth Daily., Disp: , Rfl:   •  Fluticasone-Umeclidin-Vilant (Trelegy Ellipta) 100-62.5-25 MCG/ACT inhaler, Inhale 1 puff Daily., Disp: 60 each, Rfl: 3  •  furosemide (LASIX) 20 MG tablet, Take 1 tablet by mouth Daily As Needed., Disp: , Rfl:   •  ibuprofen (ADVIL,MOTRIN) 200 MG tablet, Take 1 tablet by mouth Daily As Needed for Mild Pain., Disp: , Rfl:   •  montelukast (SINGULAIR) 10 MG tablet, Take 1 tablet by mouth Every Morning., Disp: , Rfl:   •  vitamin C (ASCORBIC ACID) 500 MG tablet, Take 1 tablet by mouth Daily., Disp: 90 tablet, Rfl: 3  •  solifenacin (VESIcare) 10 MG tablet, Take 1 tablet by mouth Daily., Disp: 30 tablet, Rfl: 0    Allergies:   No Known Allergies    Objective   Objective     Physical Exam:  Vital Signs:   Vitals:    02/23/23 1317   BP: 98/62   BP Location: Left arm   Patient Position: Sitting   Cuff Size: Adult   Pulse: 85   Temp: 97.7 °F (36.5 °C)   TempSrc: Infrared   SpO2: 98%   Weight: 73.7 kg (162 lb 6.4 oz)     Body mass index is 28.77 kg/m².     Physical Exam  Nursing note reviewed  Const: NAD, A&Ox4, Pleasant, Cooperative  Eyes: EOMI, no conjunctivitis  ENT: No nasal discharge present, neck supple  Cardiac: Regular rate and rhythm, no cyanosis  Resp: Respiratory rate within normal limits, no increased work of breathing, no audible wheezing or retractions noted  GI: No distention or ascites  MSK: Motor and sensation grossly intact in bilateral upper  extremities  Neurologic: CN II-XII grossly intact  Psych: Appropriate mood and behavior.  Skin: Warm, dry  Procedures/Radiology     Procedures  No radiology results for the last 7 days     Assessment & Plan   Assessment / Plan      Assessment/Plan:   Problems Addressed This Visit  Diagnoses and all orders for this visit:    1. Peripheral arterial disease (HCC) (Primary)  Assessment & Plan:  She states no one ever called her to schedule from [facility]. Symptomatic, tingling in legs.    Orders:  -     Ambulatory Referral to Vascular Surgery    2. Continuous leakage of urine  -     solifenacin (VESIcare) 10 MG tablet; Take 1 tablet by mouth Daily.  Dispense: 30 tablet; Refill: 0    Problem List Items Addressed This Visit        Cardiac and Vasculature    Peripheral arterial disease (HCC) - Primary    Overview     4 prior surgeries on left, 3 on right- in [place] Dr. Jerrell Block [facility] [place] [address] [phone]         Current Assessment & Plan     She states no one ever called her to schedule from [facility]. Symptomatic, tingling in legs.         Relevant Orders    Ambulatory Referral to Vascular Surgery (Completed)   Other Visit Diagnoses     Continuous leakage of urine        Relevant Medications    solifenacin (VESIcare) 10 MG tablet          Patient request referral to vascular surgery, this has previously been completed in November 2022, but we will also place a referral today.    Patient also requests refill on Vesicare 10 mg daily.  She was previously prescribed this in [place].    Patient Instructions   Call [facility] [phone]- this is who you were referred to in November  I will also place a new referral today      Follow Up:   No follow-ups on file.    DO NICO Rodarte RD  [facility]  [address]  [facility] [phone]  Fax [phone]  Phone [phone]

## 2023-02-23 NOTE — ASSESSMENT & PLAN NOTE
She states no one ever called her to schedule from [facility]. Symptomatic, tingling in legs.

## 2023-02-23 NOTE — PATIENT INSTRUCTIONS
Call [facility] [phone]- this is who you were referred to in November  I will also place a new referral today

## 2023-03-30 DIAGNOSIS — F17.200 SMOKER: ICD-10-CM

## 2023-05-03 ENCOUNTER — HOSPITAL ENCOUNTER (OUTPATIENT)
Dept: CT IMAGING | Facility: HOSPITAL | Age: 83
Discharge: HOME | End: 2023-05-03
Admitting: INTERNAL MEDICINE
Payer: MEDICARE

## 2023-05-03 DIAGNOSIS — R91.1 LUNG NODULE: ICD-10-CM

## 2023-05-03 PROCEDURE — 71250 CT THORAX DX C-: CPT

## 2023-05-22 RX ORDER — ALBUTEROL SULFATE 2.5 MG/3ML
SOLUTION RESPIRATORY (INHALATION)
Qty: 120 EACH | Refills: 11 | Status: SHIPPED | OUTPATIENT
Start: 2023-05-22

## 2023-06-07 ENCOUNTER — OFFICE VISIT (OUTPATIENT)
Dept: PULMONOLOGY | Facility: CLINIC | Age: 83
End: 2023-06-07
Payer: MEDICARE

## 2023-06-07 VITALS
OXYGEN SATURATION: 96 % | WEIGHT: 158.8 LBS | DIASTOLIC BLOOD PRESSURE: 78 MMHG | SYSTOLIC BLOOD PRESSURE: 112 MMHG | TEMPERATURE: 96.9 F | BODY MASS INDEX: 28.14 KG/M2 | HEART RATE: 85 BPM | HEIGHT: 63 IN

## 2023-06-07 DIAGNOSIS — R91.8 MULTIPLE PULMONARY NODULES: ICD-10-CM

## 2023-06-07 DIAGNOSIS — J44.9 CHRONIC OBSTRUCTIVE PULMONARY DISEASE, UNSPECIFIED COPD TYPE: Primary | ICD-10-CM

## 2023-06-07 DIAGNOSIS — Z72.0 TOBACCO USE: ICD-10-CM

## 2023-06-07 DIAGNOSIS — F17.200 SMOKER: ICD-10-CM

## 2023-06-07 PROBLEM — J30.89 ENVIRONMENTAL AND SEASONAL ALLERGIES: Status: ACTIVE | Noted: 2023-06-07

## 2023-06-07 RX ORDER — DIPHENOXYLATE HYDROCHLORIDE AND ATROPINE SULFATE 2.5; .025 MG/1; MG/1
1 TABLET ORAL DAILY
COMMUNITY

## 2023-06-07 RX ORDER — ALBUTEROL SULFATE 90 UG/1
2 AEROSOL, METERED RESPIRATORY (INHALATION) EVERY 4 HOURS PRN
Qty: 18 G | Refills: 5 | Status: SHIPPED | OUTPATIENT
Start: 2023-06-07

## 2023-06-07 NOTE — PROGRESS NOTES
"[facility] Pulmonary Follow up      Chief Complaint  Asthma (F/U)    Subjective          Lilly Garcia presents to [facility] for follow-up on her asthma COPD overlap.    She was initially seen in our office by Dr. Darin Dunn in November 2 establish care with pulmonology from moving to the area.  She was complaining of a intermittent wheezing and intermittent productive cough.  She is a smoker.  She did have mild obstruction on her PFTs as well as evidence of air trapping.  She was started on Trelegy daily, and albuterol as needed.    Dr. Dunn also recommended a follow-up annual low-dose screening CT scan which would have been in May of this year.  She did have a CT scan on May 3 that showed some stable subcentimeter pulmonary nodules, the largest 4 mm and mild emphysema changes.      Since we last saw her she did have COVID-19 with pneumonia in January was admitted to [facility].  During that time she did lose her taste and quite a bit of weight.  She does feel like she is recovered fully now.    She is on her Trelegy daily.  She has not been using her albuterol rescue inhaler much lately.  She has had no nebulizer use.  She does not have a history of hypoxemia.  She does have a history of mild obstructive sleep apnea but was intolerant to PAP therapy.        Objective     Vital Signs:   /78   Pulse 85   Temp 96.9 °F (36.1 °C) (Temporal)   Ht 160 cm (62.99\")   Wt 72 kg (158 lb 12.8 oz)   SpO2 96% Comment: Resting at room air  BMI 28.14 kg/m²       Immunization History   Administered Date(s) Administered    COVID-19 (MODERNA) 1st,2nd,3rd Dose Monovalent 01/29/2021, 02/26/2021    FluLaval/Fluzone >6mos 10/10/2017    Pneumococcal Conjugate 20-Valent (PCV20) 11/07/2022    Pneumococcal Polysaccharide (PPSV23) 10/10/2017, 09/04/2019       Physical Exam     Result Review :   The following data was reviewed by: TOMY Steen on " 06/07/2023:    Data reviewed : Radiologic studies CT of chest 5/3/2023      IMPRESSION:  Impression:  1. No acute intrathoracic process.  2. Small pulmonary nodules largest 4 mm in the right middle lobe. Correlate with any prior outside imaging to evaluate for stability, otherwise consider follow-up in 12 months.  3. Mild emphysema.  4. Three-vessel coronary artery calcifications.      PFTs done in the office today:                     Assessment and Plan    Problem List Items Addressed This Visit          Pulmonary and Pneumonias    COPD (chronic obstructive pulmonary disease) - Primary    Relevant Medications    Fluticasone-Umeclidin-Vilant (Trelegy Ellipta) 100-62.5-25 MCG/ACT inhaler    albuterol sulfate  (90 Base) MCG/ACT inhaler    Multiple pulmonary nodules    Overview     Stable subcent nodules, largest 4mm         Relevant Medications    Fluticasone-Umeclidin-Vilant (Trelegy Ellipta) 100-62.5-25 MCG/ACT inhaler    albuterol sulfate  (90 Base) MCG/ACT inhaler    Other Relevant Orders    CT Chest Without Contrast       Tobacco    Smoker    Overview     1 ppd since 1984         Relevant Medications    Fluticasone-Umeclidin-Vilant (Trelegy Ellipta) 100-62.5-25 MCG/ACT inhaler    Tobacco use       Overall she seems to be doing very well.  The Trelegy seems to be working for her.  She continue on that and albuterol as needed.    We did review her CT scan with some stable subcentimeter nodules, we will continue with annual follow-up.    We did discuss the importance of smoking cessation today in the office.      Follow Up     No follow-ups on file.  Patient was given instructions and counseling regarding her condition or for health maintenance advice. Please see specific information pulled into the AVS if appropriate.         Moderate level of Medical Decision Making complexity based on 2 or more chronic stable illnesses and prescription drug management.    Aniya Knight, APRN, ACNP  [facility]  Critical Care Medicine  Electronically signed

## 2023-06-08 DIAGNOSIS — F17.200 SMOKER: ICD-10-CM

## 2023-06-08 DIAGNOSIS — J44.9 CHRONIC OBSTRUCTIVE PULMONARY DISEASE, UNSPECIFIED COPD TYPE: Primary | ICD-10-CM

## 2023-06-08 NOTE — TELEPHONE ENCOUNTER
Pt called and said that the incorrect inhaler was sent to the pharm. Correct Rx Fluticasone-Ulmeclidin-Vilant (Trelegy Ellipata) 100-62.5-25 MCG/ACT inhaler has been sent to [facility] in [place] per Pr request.

## 2023-11-20 ENCOUNTER — OFFICE VISIT (OUTPATIENT)
Dept: ORTHOPEDIC SURGERY | Facility: CLINIC | Age: 83
End: 2023-11-20
Payer: MEDICARE

## 2023-11-20 VITALS
BODY MASS INDEX: 30.18 KG/M2 | DIASTOLIC BLOOD PRESSURE: 74 MMHG | HEIGHT: 62 IN | SYSTOLIC BLOOD PRESSURE: 118 MMHG | WEIGHT: 164 LBS

## 2023-11-20 DIAGNOSIS — M17.0 PRIMARY OSTEOARTHRITIS OF KNEES, BILATERAL: Primary | ICD-10-CM

## 2023-11-20 PROCEDURE — 99204 OFFICE O/P NEW MOD 45 MIN: CPT | Performed by: ORTHOPAEDIC SURGERY

## 2023-11-20 PROCEDURE — 1159F MED LIST DOCD IN RCRD: CPT | Performed by: ORTHOPAEDIC SURGERY

## 2023-11-20 PROCEDURE — 1160F RVW MEDS BY RX/DR IN RCRD: CPT | Performed by: ORTHOPAEDIC SURGERY

## 2023-11-20 PROCEDURE — 20610 DRAIN/INJ JOINT/BURSA W/O US: CPT | Performed by: ORTHOPAEDIC SURGERY

## 2023-11-20 RX ORDER — ROPIVACAINE HYDROCHLORIDE 5 MG/ML
4 INJECTION, SOLUTION EPIDURAL; INFILTRATION; PERINEURAL
Status: COMPLETED | OUTPATIENT
Start: 2023-11-20 | End: 2023-11-20

## 2023-11-20 RX ORDER — TRIAMCINOLONE ACETONIDE 40 MG/ML
40 INJECTION, SUSPENSION INTRA-ARTICULAR; INTRAMUSCULAR
Status: COMPLETED | OUTPATIENT
Start: 2023-11-20 | End: 2023-11-20

## 2023-11-20 RX ADMIN — ROPIVACAINE HYDROCHLORIDE 4 ML: 5 INJECTION, SOLUTION EPIDURAL; INFILTRATION; PERINEURAL at 09:48

## 2023-11-20 RX ADMIN — TRIAMCINOLONE ACETONIDE 40 MG: 40 INJECTION, SUSPENSION INTRA-ARTICULAR; INTRAMUSCULAR at 09:48

## 2023-11-20 NOTE — PROGRESS NOTES
Procedure   - Large Joint Arthrocentesis: bilateral knee on 11/20/2023 9:48 AM  Indications: pain  Details: 21 G needle, anterolateral approach  Medications (Right): 40 mg triamcinolone acetonide 40 MG/ML; 4 mL ropivacaine 0.5 %  Medications (Left): 40 mg triamcinolone acetonide 40 MG/ML; 4 mL ropivacaine 0.5 %  Outcome: tolerated well, no immediate complications  Procedure, treatment alternatives, risks and benefits explained, specific risks discussed. Consent was given by the patient. Immediately prior to procedure a time out was called to verify the correct patient, procedure, equipment, support staff and site/side marked as required. Patient was prepped and draped in the usual sterile fashion.

## 2023-11-20 NOTE — PROGRESS NOTES
[facility] Note    Subjective     Chief Complaint   Patient presents with    Left Knee - Pain    Right Knee - Pain        HPI    Lilly Garcia is a 83 y.o. female who presents with new problem of: bilateral knee pain.  Onset: atraumatic and gradual in nature. The issue has been ongoing for 5 year(s). Pain is a 8/10 on the pain scale. Pain is described as aching, burning, and shooting. Associated symptoms include pain, swelling, popping, and stiffness. The pain is worse with walking, standing, sitting, climbing stairs, and working; resting, pain medication and/or NSAID, and lying down improve the pain. Previous treatments have included: cane/walker, NSAIDS, physical therapy, and steroid injection (last injection 11/2019)and gel injections (4/2023).  Right knee bothers her more than the left.  No significant relief with viscosupplementation injections.    I have reviewed the following portions of the patient's history and agree with: History of Present Illness and Review of Systems    Patient Active Problem List   Diagnosis    Smoker    Peripheral arterial disease    COPD (chronic obstructive pulmonary disease)    Tobacco use    Environmental and seasonal allergies    Multiple pulmonary nodules     Past Medical History:   Diagnosis Date    Bladder infection     Rheumatoid arteritis       Past Surgical History:   Procedure Laterality Date    CHOLECYSTECTOMY  1989    HYSTERECTOMY  1976    TONSILLECTOMY  1964      Family History   Problem Relation Age of Onset    Hypertension Mother     Heart disease Mother     Diabetes Father     Heart disease Father     Cancer Sister     Cancer Brother     Hypertension Brother      Social History     Socioeconomic History    Marital status:    Tobacco Use    Smoking status: Every Day     Packs/day: 1.00     Years: 38.00     Additional pack years: 0.00     Total pack years: 38.00     Types: Cigarettes    Smokeless tobacco: Never   Vaping Use    Vaping  "Use: Never used   Substance and Sexual Activity    Alcohol use: Never    Drug use: Never    Sexual activity: Defer      Current Outpatient Medications on File Prior to Visit   Medication Sig Dispense Refill    Acetaminophen (TYLENOL 8 HOUR PO) Take 1 tablet by mouth As Needed.      acetaminophen (TYLENOL) 500 MG tablet Take 1 tablet by mouth Daily As Needed for Mild Pain.      albuterol (PROVENTIL) (2.5 MG/3ML) 0.083% nebulizer solution USE 1 VIAL IN NEBULIZER 4 TIMES DAILY 120 each 11    albuterol sulfate  (90 Base) MCG/ACT inhaler Inhale 2 puffs Every 4 (Four) Hours As Needed for Wheezing. 18 g 5    aspirin 81 MG EC tablet Take 1 tablet by mouth Daily.      Fluticasone-Umeclidin-Vilant (Trelegy Ellipta) 100-62.5-25 MCG/ACT inhaler Inhale 1 puff Daily. 1 each 11    furosemide (LASIX) 20 MG tablet Take 1 tablet by mouth Daily As Needed.      ibuprofen (ADVIL,MOTRIN) 200 MG tablet Take 1 tablet by mouth Daily As Needed for Mild Pain.      montelukast (SINGULAIR) 10 MG tablet Take 1 tablet by mouth Every Morning.      multivitamin (MULTIPLE VITAMINS ESSENTIAL PO) Take 1 tablet by mouth Daily.      solifenacin (VESIcare) 10 MG tablet Take 1 tablet by mouth Daily. 30 tablet 0    vitamin C (ASCORBIC ACID) 500 MG tablet Take 1 tablet by mouth Daily. 90 tablet 3     No current facility-administered medications on file prior to visit.      No Known Allergies     Review of Systems   Constitutional: Negative.    HENT: Negative.     Eyes: Negative.    Respiratory: Negative.     Cardiovascular: Negative.    Gastrointestinal: Negative.    Endocrine: Negative.    Genitourinary: Negative.    Musculoskeletal:  Positive for arthralgias.   Skin: Negative.    Allergic/Immunologic: Negative.    Neurological: Negative.    Hematological: Negative.    Psychiatric/Behavioral: Negative.          Objective      Physical Exam  /74   Ht 157.5 cm (62\")   Wt 74.4 kg (164 lb)   BMI 30.00 kg/m²     Body mass index is 30 " kg/m².    General:   Mental Status:  Alert   Appearance: Cooperative, in no acute distress   Build and Nutrition: Well-nourished well-developed female   Orientation: Alert and oriented to person, place and time   Posture: Normal   Gait: Nonantalgic, but she does have a walker with her today    Integument:   Right knee: no skin lesions, no rash, no ecchymosis   Left knee: no skin lesions, no rash, no ecchymosis    Neurologic:   Sensation:    Right foot: intact to light touch on the dorsal and plantar aspect    Left foot: intact to light touch on the dorsal and plantar aspect   Motor:  Right lower extremity: 5/5 quadriceps, hamstrings, ankle dorsiflexors, and ankle plantar flexors  Left lower extremity: 5/5 quadriceps, hamstrings, ankle dorsiflexors, and ankle plantar flexors  Vascular:   Right lower extremity: 2+ dorsalis pedis pulse, prompt capillary refill   Left lower extremity: 2+ dorsalis pedis pulse, prompt capillary refill    Lower Extremities:   Right Knee:    Tenderness:  Medial/lateral joint line tenderness    Effusion:  1+    Swelling:  None    Crepitus:  Positive    Atrophy:  None    Range of motion:  Extension: 5°       Flexion: 120°  Instability:  No varus laxity, no valgus laxity, negative anterior drawer  Deformities:  Varus   Left Knee:    Tenderness:  Medial/lateral joint line tenderness    Effusion:  None    Swelling:  None    Crepitus: Positive    Atrophy:  None    Range of motion:  Extension: 5°       Flexion: 120°  Instability:  No varus laxity, no valgus laxity, negative anterior drawer  Deformities:  Varus      Imaging/Studies      Imaging Results (Last 24 Hours)       ** No results found for the last 24 hours. **          Outside imaging of both knees was reviewed, which showed advanced arthritis in both knees, with no acute bony abnormalities.    Assessment and Plan     Diagnoses and all orders for this visit:    1. Primary osteoarthritis of knees, bilateral (Primary)  -     - Large Joint  Arthrocentesis: bilateral knee        1. Primary osteoarthritis of knees, bilateral        I reviewed my findings with the patient and her daughter.  We discussed options today, and she currently smokes and has not had a steroid injection in some time.  She would like to start with steroid injections today, which were provided.  If she decides for surgery in the future, she would need to stop smoking which we discussed today.  Please see my procedure note for details.  I will see her back in 4 months, but sooner for any problems.    Procedure Note:  The potential benefits of performing a therapeutic bilateral knee joint injections, as well as potential risks (including, but not limited to infection, swelling, pain, bleeding, bruising, nerve/blood vessel damage, skin color changes, transient elevation in blood glucose levels, and fat atrophy) were discussed with the patient.  After informed consent, timeout procedure was performed, and the skin on the right and left knees was prepped with chlorhexidine soap and alcohol, after which ethyl chloride was applied to the skin at the injection site. Via the anterolateral approach, 1ml of Kenalog 40mg/ml mixed with 4ml 0.5% ropivacaine plain was injected into the knee joints.  The patient tolerated the procedures well, experiencing 95% improvement in the right knee and 95% improvement in the left knee a few minutes following the injections. There were no complications.  Band-Aids were applied to the injection sites. Post-procedural instructions were given to the patient and/or their caregiver.      Return in about 4 months (around 3/20/2024).      Clayton Brannon MD  11/20/23  12:31 EST

## 2023-12-23 ENCOUNTER — HOSPITAL ENCOUNTER (INPATIENT)
Age: 83
LOS: 2 days | Discharge: HOME HEALTH | DRG: 068 | End: 2023-12-27
Attending: EMERGENCY MEDICINE | Admitting: STUDENT IN AN ORGANIZED HEALTH CARE EDUCATION/TRAINING PROGRAM
Payer: MEDICARE

## 2023-12-23 ENCOUNTER — APPOINTMENT (OUTPATIENT)
Dept: CT IMAGING | Age: 83
DRG: 068 | End: 2023-12-23
Payer: MEDICARE

## 2023-12-23 ENCOUNTER — APPOINTMENT (OUTPATIENT)
Dept: MRI IMAGING | Age: 83
DRG: 068 | End: 2023-12-23
Payer: MEDICARE

## 2023-12-23 DIAGNOSIS — R42 DIZZINESS: Primary | ICD-10-CM

## 2023-12-23 DIAGNOSIS — G45.8 SUBCLAVIAN STEAL SYNDROME: ICD-10-CM

## 2023-12-23 PROBLEM — G45.9 TIA (TRANSIENT ISCHEMIC ATTACK): Status: ACTIVE | Noted: 2023-12-23

## 2023-12-23 LAB
ALBUMIN SERPL-MCNC: 4.2 GM/DL (ref 3.4–5)
ALP BLD-CCNC: 107 IU/L (ref 40–129)
ALT SERPL-CCNC: 21 U/L (ref 10–40)
ANION GAP SERPL CALCULATED.3IONS-SCNC: 11 MMOL/L (ref 7–16)
APTT: 26.5 SECONDS (ref 25.1–37.1)
AST SERPL-CCNC: 34 IU/L (ref 15–37)
BANDED NEUTROPHILS ABSOLUTE COUNT: 0.07 K/CU MM
BANDED NEUTROPHILS RELATIVE PERCENT: 1 % (ref 5–11)
BILIRUB SERPL-MCNC: 0.4 MG/DL (ref 0–1)
BILIRUBIN DIRECT: 0.2 MG/DL (ref 0–0.3)
BILIRUBIN, INDIRECT: 0.2 MG/DL (ref 0–0.7)
BUN SERPL-MCNC: 17 MG/DL (ref 6–23)
CALCIUM SERPL-MCNC: 8.8 MG/DL (ref 8.3–10.6)
CHLORIDE BLD-SCNC: 106 MMOL/L (ref 99–110)
CHOLEST SERPL-MCNC: 163 MG/DL
CO2: 25 MMOL/L (ref 21–32)
CREAT SERPL-MCNC: 0.9 MG/DL (ref 0.6–1.1)
DIFFERENTIAL TYPE: ABNORMAL
EKG ATRIAL RATE: 72 BPM
EKG DIAGNOSIS: NORMAL
EKG P AXIS: 72 DEGREES
EKG P-R INTERVAL: 130 MS
EKG Q-T INTERVAL: 434 MS
EKG QRS DURATION: 100 MS
EKG QTC CALCULATION (BAZETT): 475 MS
EKG R AXIS: 24 DEGREES
EKG T AXIS: 33 DEGREES
EKG VENTRICULAR RATE: 72 BPM
EOSINOPHILS ABSOLUTE: 0.1 K/CU MM
EOSINOPHILS RELATIVE PERCENT: 1 % (ref 0–3)
ESTIMATED AVERAGE GLUCOSE: 123 MG/DL
GFR SERPL CREATININE-BSD FRML MDRD: >60 ML/MIN/1.73M2
GLUCOSE SERPL-MCNC: 101 MG/DL (ref 70–99)
HBA1C MFR BLD: 5.9 % (ref 4.2–6.3)
HCT VFR BLD CALC: 43.9 % (ref 37–47)
HDLC SERPL-MCNC: 53 MG/DL
HEMOGLOBIN: 14.1 GM/DL (ref 12.5–16)
INR BLD: 0.9 INDEX
LDLC SERPL CALC-MCNC: 90 MG/DL
LYMPHOCYTES ABSOLUTE: 2.1 K/CU MM
LYMPHOCYTES RELATIVE PERCENT: 33 % (ref 24–44)
MCH RBC QN AUTO: 28.9 PG (ref 27–31)
MCHC RBC AUTO-ENTMCNC: 32.1 % (ref 32–36)
MCV RBC AUTO: 90 FL (ref 78–100)
MONOCYTES ABSOLUTE: 2 K/CU MM
MONOCYTES RELATIVE PERCENT: 30 % (ref 0–4)
PDW BLD-RTO: 13.3 % (ref 11.7–14.9)
PLATELET # BLD: 128 K/CU MM (ref 140–440)
PMV BLD AUTO: 10.1 FL (ref 7.5–11.1)
POTASSIUM SERPL-SCNC: 4.5 MMOL/L (ref 3.5–5.1)
PROTHROMBIN TIME: 13.1 SECONDS (ref 11.7–14.5)
RBC # BLD: 4.88 M/CU MM (ref 4.2–5.4)
SEGMENTED NEUTROPHILS ABSOLUTE COUNT: 2.2 K/CU MM
SEGMENTED NEUTROPHILS RELATIVE PERCENT: 35 % (ref 36–66)
SODIUM BLD-SCNC: 142 MMOL/L (ref 135–145)
TOTAL PROTEIN: 6.3 GM/DL (ref 6.4–8.2)
TRIGL SERPL-MCNC: 99 MG/DL
TROPONIN, HIGH SENSITIVITY: 16 NG/L (ref 0–14)
TROPONIN, HIGH SENSITIVITY: 17 NG/L (ref 0–14)
WBC # BLD: 6.5 K/CU MM (ref 4–10.5)

## 2023-12-23 PROCEDURE — 93005 ELECTROCARDIOGRAM TRACING: CPT | Performed by: EMERGENCY MEDICINE

## 2023-12-23 PROCEDURE — 80053 COMPREHEN METABOLIC PANEL: CPT

## 2023-12-23 PROCEDURE — 70450 CT HEAD/BRAIN W/O DYE: CPT

## 2023-12-23 PROCEDURE — 96372 THER/PROPH/DIAG INJ SC/IM: CPT

## 2023-12-23 PROCEDURE — 6360000004 HC RX CONTRAST MEDICATION: Performed by: EMERGENCY MEDICINE

## 2023-12-23 PROCEDURE — 85027 COMPLETE CBC AUTOMATED: CPT

## 2023-12-23 PROCEDURE — 85730 THROMBOPLASTIN TIME PARTIAL: CPT

## 2023-12-23 PROCEDURE — 85007 BL SMEAR W/DIFF WBC COUNT: CPT

## 2023-12-23 PROCEDURE — 83036 HEMOGLOBIN GLYCOSYLATED A1C: CPT

## 2023-12-23 PROCEDURE — 97530 THERAPEUTIC ACTIVITIES: CPT

## 2023-12-23 PROCEDURE — 6360000002 HC RX W HCPCS: Performed by: STUDENT IN AN ORGANIZED HEALTH CARE EDUCATION/TRAINING PROGRAM

## 2023-12-23 PROCEDURE — 70498 CT ANGIOGRAPHY NECK: CPT

## 2023-12-23 PROCEDURE — 36415 COLL VENOUS BLD VENIPUNCTURE: CPT

## 2023-12-23 PROCEDURE — 93010 ELECTROCARDIOGRAM REPORT: CPT | Performed by: INTERNAL MEDICINE

## 2023-12-23 PROCEDURE — G0378 HOSPITAL OBSERVATION PER HR: HCPCS

## 2023-12-23 PROCEDURE — 6370000000 HC RX 637 (ALT 250 FOR IP): Performed by: STUDENT IN AN ORGANIZED HEALTH CARE EDUCATION/TRAINING PROGRAM

## 2023-12-23 PROCEDURE — 97116 GAIT TRAINING THERAPY: CPT

## 2023-12-23 PROCEDURE — 2580000003 HC RX 258: Performed by: EMERGENCY MEDICINE

## 2023-12-23 PROCEDURE — 6370000000 HC RX 637 (ALT 250 FOR IP): Performed by: EMERGENCY MEDICINE

## 2023-12-23 PROCEDURE — 84484 ASSAY OF TROPONIN QUANT: CPT

## 2023-12-23 PROCEDURE — 82248 BILIRUBIN DIRECT: CPT

## 2023-12-23 PROCEDURE — 70551 MRI BRAIN STEM W/O DYE: CPT

## 2023-12-23 PROCEDURE — 94761 N-INVAS EAR/PLS OXIMETRY MLT: CPT

## 2023-12-23 PROCEDURE — 80061 LIPID PANEL: CPT

## 2023-12-23 PROCEDURE — 97165 OT EVAL LOW COMPLEX 30 MIN: CPT

## 2023-12-23 PROCEDURE — 97162 PT EVAL MOD COMPLEX 30 MIN: CPT

## 2023-12-23 PROCEDURE — 99285 EMERGENCY DEPT VISIT HI MDM: CPT

## 2023-12-23 PROCEDURE — 2580000003 HC RX 258: Performed by: STUDENT IN AN ORGANIZED HEALTH CARE EDUCATION/TRAINING PROGRAM

## 2023-12-23 PROCEDURE — 94640 AIRWAY INHALATION TREATMENT: CPT

## 2023-12-23 PROCEDURE — 85610 PROTHROMBIN TIME: CPT

## 2023-12-23 RX ORDER — IPRATROPIUM BROMIDE AND ALBUTEROL SULFATE 2.5; .5 MG/3ML; MG/3ML
1 SOLUTION RESPIRATORY (INHALATION)
Status: DISCONTINUED | OUTPATIENT
Start: 2023-12-23 | End: 2023-12-27 | Stop reason: HOSPADM

## 2023-12-23 RX ORDER — MECLIZINE HCL 12.5 MG/1
25 TABLET ORAL ONCE
Status: COMPLETED | OUTPATIENT
Start: 2023-12-23 | End: 2023-12-23

## 2023-12-23 RX ORDER — ACETAMINOPHEN 325 MG/1
650 TABLET ORAL EVERY 6 HOURS PRN
Status: DISCONTINUED | OUTPATIENT
Start: 2023-12-23 | End: 2023-12-27 | Stop reason: HOSPADM

## 2023-12-23 RX ORDER — MAGNESIUM SULFATE IN WATER 40 MG/ML
2000 INJECTION, SOLUTION INTRAVENOUS PRN
Status: DISCONTINUED | OUTPATIENT
Start: 2023-12-23 | End: 2023-12-27 | Stop reason: HOSPADM

## 2023-12-23 RX ORDER — POTASSIUM CHLORIDE 7.45 MG/ML
10 INJECTION INTRAVENOUS PRN
Status: DISCONTINUED | OUTPATIENT
Start: 2023-12-23 | End: 2023-12-27 | Stop reason: HOSPADM

## 2023-12-23 RX ORDER — BUDESONIDE AND FORMOTEROL FUMARATE DIHYDRATE 160; 4.5 UG/1; UG/1
2 AEROSOL RESPIRATORY (INHALATION) 2 TIMES DAILY
Status: DISCONTINUED | OUTPATIENT
Start: 2023-12-23 | End: 2023-12-27 | Stop reason: HOSPADM

## 2023-12-23 RX ORDER — ACETAMINOPHEN 650 MG/1
650 SUPPOSITORY RECTAL EVERY 6 HOURS PRN
Status: DISCONTINUED | OUTPATIENT
Start: 2023-12-23 | End: 2023-12-27 | Stop reason: HOSPADM

## 2023-12-23 RX ORDER — MECLIZINE HYDROCHLORIDE 25 MG/1
12.5 TABLET ORAL 3 TIMES DAILY PRN
Status: DISCONTINUED | OUTPATIENT
Start: 2023-12-23 | End: 2023-12-27 | Stop reason: HOSPADM

## 2023-12-23 RX ORDER — SODIUM CHLORIDE 0.9 % (FLUSH) 0.9 %
5-40 SYRINGE (ML) INJECTION PRN
Status: DISCONTINUED | OUTPATIENT
Start: 2023-12-23 | End: 2023-12-27 | Stop reason: HOSPADM

## 2023-12-23 RX ORDER — SODIUM CHLORIDE 0.9 % (FLUSH) 0.9 %
5-40 SYRINGE (ML) INJECTION EVERY 12 HOURS SCHEDULED
Status: DISCONTINUED | OUTPATIENT
Start: 2023-12-23 | End: 2023-12-27 | Stop reason: HOSPADM

## 2023-12-23 RX ORDER — ASPIRIN 81 MG/1
81 TABLET, CHEWABLE ORAL DAILY
Status: DISCONTINUED | OUTPATIENT
Start: 2023-12-23 | End: 2023-12-27 | Stop reason: HOSPADM

## 2023-12-23 RX ORDER — ONDANSETRON 4 MG/1
4 TABLET, ORALLY DISINTEGRATING ORAL EVERY 8 HOURS PRN
Status: DISCONTINUED | OUTPATIENT
Start: 2023-12-23 | End: 2023-12-27 | Stop reason: HOSPADM

## 2023-12-23 RX ORDER — POTASSIUM CHLORIDE 20 MEQ/1
40 TABLET, EXTENDED RELEASE ORAL PRN
Status: DISCONTINUED | OUTPATIENT
Start: 2023-12-23 | End: 2023-12-27 | Stop reason: HOSPADM

## 2023-12-23 RX ORDER — ATORVASTATIN CALCIUM 40 MG/1
40 TABLET, FILM COATED ORAL NIGHTLY
Status: DISCONTINUED | OUTPATIENT
Start: 2023-12-23 | End: 2023-12-27 | Stop reason: HOSPADM

## 2023-12-23 RX ORDER — BUDESONIDE AND FORMOTEROL FUMARATE DIHYDRATE 160; 4.5 UG/1; UG/1
2 AEROSOL RESPIRATORY (INHALATION) 2 TIMES DAILY
Status: DISCONTINUED | OUTPATIENT
Start: 2023-12-23 | End: 2023-12-23 | Stop reason: CLARIF

## 2023-12-23 RX ORDER — MONTELUKAST SODIUM 10 MG/1
10 TABLET ORAL DAILY
Status: DISCONTINUED | OUTPATIENT
Start: 2023-12-23 | End: 2023-12-27 | Stop reason: HOSPADM

## 2023-12-23 RX ORDER — ALBUTEROL SULFATE 90 UG/1
2 AEROSOL, METERED RESPIRATORY (INHALATION) EVERY 6 HOURS PRN
Status: DISCONTINUED | OUTPATIENT
Start: 2023-12-23 | End: 2023-12-27 | Stop reason: HOSPADM

## 2023-12-23 RX ORDER — ENOXAPARIN SODIUM 100 MG/ML
40 INJECTION SUBCUTANEOUS DAILY
Status: DISCONTINUED | OUTPATIENT
Start: 2023-12-23 | End: 2023-12-27 | Stop reason: HOSPADM

## 2023-12-23 RX ORDER — MECLIZINE HYDROCHLORIDE 25 MG/1
25 TABLET ORAL 3 TIMES DAILY PRN
Qty: 15 TABLET | Refills: 0 | Status: SHIPPED | OUTPATIENT
Start: 2023-12-23 | End: 2024-01-02

## 2023-12-23 RX ORDER — 0.9 % SODIUM CHLORIDE 0.9 %
500 INTRAVENOUS SOLUTION INTRAVENOUS ONCE
Status: COMPLETED | OUTPATIENT
Start: 2023-12-23 | End: 2023-12-23

## 2023-12-23 RX ORDER — POLYETHYLENE GLYCOL 3350 17 G/17G
17 POWDER, FOR SOLUTION ORAL DAILY PRN
Status: DISCONTINUED | OUTPATIENT
Start: 2023-12-23 | End: 2023-12-27 | Stop reason: HOSPADM

## 2023-12-23 RX ORDER — ONDANSETRON 2 MG/ML
4 INJECTION INTRAMUSCULAR; INTRAVENOUS EVERY 6 HOURS PRN
Status: DISCONTINUED | OUTPATIENT
Start: 2023-12-23 | End: 2023-12-27 | Stop reason: HOSPADM

## 2023-12-23 RX ORDER — SODIUM CHLORIDE 9 MG/ML
INJECTION, SOLUTION INTRAVENOUS PRN
Status: DISCONTINUED | OUTPATIENT
Start: 2023-12-23 | End: 2023-12-27 | Stop reason: HOSPADM

## 2023-12-23 RX ADMIN — IPRATROPIUM BROMIDE AND ALBUTEROL SULFATE 1 DOSE: 2.5; .5 SOLUTION RESPIRATORY (INHALATION) at 14:45

## 2023-12-23 RX ADMIN — MONTELUKAST 10 MG: 10 TABLET, FILM COATED ORAL at 13:19

## 2023-12-23 RX ADMIN — MECLIZINE 25 MG: 12.5 TABLET ORAL at 05:38

## 2023-12-23 RX ADMIN — BUDESONIDE AND FORMOTEROL FUMARATE DIHYDRATE 2 PUFF: 160; 4.5 AEROSOL RESPIRATORY (INHALATION) at 20:50

## 2023-12-23 RX ADMIN — IOPAMIDOL 100 ML: 755 INJECTION, SOLUTION INTRAVENOUS at 06:22

## 2023-12-23 RX ADMIN — BUDESONIDE AND FORMOTEROL FUMARATE DIHYDRATE 2 PUFF: 160; 4.5 AEROSOL RESPIRATORY (INHALATION) at 14:46

## 2023-12-23 RX ADMIN — SODIUM CHLORIDE 500 ML: 9 INJECTION, SOLUTION INTRAVENOUS at 05:40

## 2023-12-23 RX ADMIN — SODIUM CHLORIDE, PRESERVATIVE FREE 10 ML: 5 INJECTION INTRAVENOUS at 20:30

## 2023-12-23 RX ADMIN — ATORVASTATIN CALCIUM 40 MG: 40 TABLET, FILM COATED ORAL at 20:30

## 2023-12-23 RX ADMIN — IPRATROPIUM BROMIDE AND ALBUTEROL SULFATE 1 DOSE: 2.5; .5 SOLUTION RESPIRATORY (INHALATION) at 20:50

## 2023-12-23 RX ADMIN — ASPIRIN 81 MG: 81 TABLET, CHEWABLE ORAL at 14:46

## 2023-12-23 RX ADMIN — ENOXAPARIN SODIUM 40 MG: 100 INJECTION SUBCUTANEOUS at 14:46

## 2023-12-23 ASSESSMENT — PAIN - FUNCTIONAL ASSESSMENT: PAIN_FUNCTIONAL_ASSESSMENT: NONE - DENIES PAIN

## 2023-12-23 NOTE — ED NOTES
Transfer of care to [address] [place] [address]   Daughter went home to get some belongings to take to [facility] for patient

## 2023-12-23 NOTE — PROGRESS NOTES
This Chacha Tammy Dr. Jessenia Renee, for orders at [phone] and [id number]. Per Dr. Jessenia Renee he sent to Dr. Janett Navarro and he will be the Admitting provider to put in orders. Currently I am waiting for orders from Janett Navarro and unable to message him. Currently waiting for Janett Navarro to come to floor and put in orders.

## 2023-12-23 NOTE — ED PROVIDER NOTES
Emergency Department Encounter  Location: [address] [place] [address]    Patient: Carly Landon  MRN: [id number]  : 1940  Date of evaluation: 2023  ED Provider: Maribel Pereira DO    Carly Landon was checked out to me by Dr. Russ Reagan. Please see his/her initial documentation for details of the patient's initial ED presentation, physical exam and completed studies. In brief, Carly Landon is a 82 y.o. female that presented to the emergency department after waking up feeling dizzy. Patient without focal neurologic deficit per Dr. Nereyda Juarez on arrival.  Care endorsed to me pending imaging results. EKG and labs are reported as unremarkable. CT head with involutional changes concerning for microvascular ischemic disease as well as chronic left basal ganglia encephalomalacia. CTA is however concerning for severe focal stenosis of the proximal left subclavian artery. The possibility of free-floating thrombus in the area cannot be excluded per radiologist.  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     I have reviewed and interpreted all of the currently available lab results and diagnostics from this visit:  Results for orders placed or performed during the hospital encounter of 23   CBC with Auto Differential   Result Value Ref Range    WBC 6.5 4.0 - 10.5 K/CU MM    RBC 4.88 4.2 - 5.4 M/CU MM    Hemoglobin 14.1 12.5 - 16.0 GM/DL    Hematocrit 43.9 37 - 47 %    MCV 90.0 78 - 100 FL    MCH 28.9 27 - 31 PG    MCHC 32.1 32.0 - 36.0 %    RDW 13.3 11.7 - 14.9 %    Platelets 430 (L) 303 - 440 K/CU MM    MPV 10.1 7.5 - 11.1 FL    Bands Relative 1 (L) 5 - 11 %    Segs Relative 35.0 (L) 36 - 66 %    Eosinophils % 1.0 0 - 3 %    Lymphocytes % 33.0 24 - 44 %    Monocytes % 30.0 (H) 0 - 4 %    Bands Absolute 0.07 K/CU MM    Segs Absolute 2.2 K/CU MM    Eosinophils Absolute 0.1 K/CU MM    Lymphocytes Absolute 2.1 criteria. Automated exposure control, iterative reconstruction, and/or weight based adjustment of the mA/kV was utilized to reduce the radiation dose to as low as reasonably achievable.; CT of the head was performed without the administration of intravenous contrast. Automated exposure control, iterative reconstruction, and/or weight based adjustment of the mA/kV was utilized to reduce the radiation dose to as low as reasonably achievable. Noncontrast CT of the head with reconstructed 2-D images are also provided for review. COMPARISON: None. HISTORY: ORDERING SYSTEM PROVIDED HISTORY: Dizziness TECHNOLOGIST PROVIDED HISTORY: Reason for exam:->Dizziness Has a \"code stroke\" or \"stroke alert\" been called?->No Decision Support Exception - unselect if not a suspected or confirmed emergency medical condition->Emergency Medical Condition (MA) Reason for Exam: dizzy FINDINGS: CT HEAD: BRAIN/VENTRICLES:  No acute intracranial hemorrhage or extraaxial fluid collection.  No large territorial hypodensity.  Involutional parenchymal changes with suggestion of microvascular ischemic disease.  Chronic left basal ganglia encephalomalacia/gliosis.  No evidence of mass, mass effect or midline shift.  No evidence of hydrocephalus. ORBITS: The visualized portion of the orbits demonstrate no acute abnormality. SINUSES:  Mild partial opacification of the ethmoid sinuses.  Trace right mastoid fluid. SOFT TISSUES/SKULL: No acute abnormality of the visualized skull or soft tissues. CTA NECK: AORTIC ARCH/ARCH VESSELS: No dissection or arterial injury.  Severe focal stenosis of the proximal left subclavian artery prior to the vertebral artery takeoff; evaluation is limited by motion degradation. CAROTID ARTERIES: No dissection, arterial injury, or hemodynamically significant stenosis by NASCET criteria. VERTEBRAL ARTERIES: No dissection, arterial injury, or significant stenosis. SOFT TISSUES: Mild emphysematous changes of the lungs with

## 2023-12-23 NOTE — PROGRESS NOTES
Physical Therapy  Facility/Department: [address]  Physical Therapy Initial Assessment    Name: Luz Ordoñez  : 1940  MRN: [id number]  Date of Service: 2023    Discharge Recommendations:  24 hour supervision or assist, Home with Home health PT        Functional Outcome Measure:    Acute Care Index of Function (ACIF):    Score: 0.82 (0.71 or greater = appropriate for home with home PT and 24 hour supervision/assist)        Patient Diagnosis(es): The primary encounter diagnosis was Dizziness. A diagnosis of Subclavian steal syndrome was also pertinent to this visit.  Past Medical History:  has a past medical history of COPD (chronic obstructive pulmonary disease) (HCC).  Past Surgical History:  has no past surgical history on file.    Assessment   Body Structures, Functions, Activity Limitations Requiring Skilled Therapeutic Intervention: Decreased functional mobility ;Decreased endurance;Decreased balance;Decreased strength  Therapy Prognosis: Good  Decision Making: Medium Complexity  Clinical Presentation: evolving  Requires PT Follow-Up: Yes  Activity Tolerance  Activity Tolerance: Patient tolerated evaluation without incident     Plan   Physical Therapy Plan  General Plan: 3-5 times per week  Current Treatment Recommendations: Strengthening, ROM, Balance training, Functional mobility training, Transfer training, ADL/Self-care training, IADL training, Endurance training, Equipment evaluation, education, & procurement, Wheelchair mobility training, Gait training, Stair training, Neuromuscular re-education, Safety education & training, Patient/Caregiver education & training, Therapeutic activities, Pain management, Home exercise program, Positioning  Safety Devices  Type of Devices: All fall risk precautions in place, Call light within reach, Left in chair, Chair alarm in place, Gait belt, Nurse notified     Restrictions  Restrictions/Precautions  Restrictions/Precautions: General Precautions  Method: Demonstration;Verbal  Education Outcome: Verbalized understanding;Demonstrated understanding      Time In: 6284  Time Out: 1306  Total Treatment Time: 20  Timed Code Treatment Minutes: [address] [place] Mayi Vick DPT  License #: [id number]

## 2023-12-23 NOTE — ED NOTES
Pt ambulatory to bathroom reports feeling much better  Gait steady pt walked back to bed without assistance

## 2023-12-23 NOTE — H&P
V2.0  History and Physical      Name:  Luz Ordoñez /Age/Sex: 1940  (83 y.o. female)   MRN & CSN:  [id number] & [id number] Encounter Date/Time: 2023 2:41 PM EST   Location:  [facility] PCP: Kayla Redding PA       Hospital Day: 1    Assessment and Plan:   Luz Ordoñez is a 83 y.o. female with a pmh of COPD, PVD who presents with TIA (transient ischemic attack)    Hospital Problems             Last Modified POA    * (Principal) TIA (transient ischemic attack) 2023 Yes       Dizziness: likely elated to peripheral vertigo vs less likely CVA vs related to severe focal stenosis in subclavian artery as seen on CTA head and neck, Order MRI brain wo contrast to r/o CVA, Neuro intervention consult, continue aspirin check lipid panel, will add statin due to PAD, PT/OT consult  COPD: Continue home dose of inhalers not in exacerbation      Discussed with ED provider regarding admission and agree.     EKG interpreted personally and results are NSR with PACs      Imaging that was interpreted personally includes CT head and cta head and neck and results are as above     Drugs that require monitoring for toxicity include Lovenox and the way of monitoring is daily CBC      Comment: Please note this report has been produced using speech recognition software and may contain errors related to that system including errors in grammar, punctuation, and spelling, as well as words and phrases that may be inappropriate. If there are any questions or concerns please feel free to contact the dictating provider for clarification.     Disposition:   Current Living situation: Home  Expected Disposition: same  Estimated D/C: 1 day    Diet ADULT DIET; Regular; 3 carb choices (45 gm/meal); Low Fat/Low Chol/High Fiber/LARRY; Low Sodium (2 gm)   DVT Prophylaxis [] Lovenox, []  Heparin, [] SCDs, [] Ambulation,  [] Eliquis, [] Xarelto   Code Status Full Code   Surrogate Decision Maker/ POA daughter     History from:  stenosis the left vertebral artery V4 segment. No aneurysm. OTHER: No dural venous sinus thrombosis on this non-dedicated study. CT Brain: 1. No acute intracranial abnormality. 2. Involutional parenchymal changes with suggestion of microvascular ischemic disease. 3. Chronic left basal ganglia encephalomalacia/gliosis. CTA Head/Neck: 1. Severe focal stenosis of the proximal left subclavian artery prior to the vertebral artery takeoff; evaluation is limited by motion degradation. Possibility or free-floating thrombus cannot be excluded. Stenosis also raises the possibility of left-sided subclavian steal phenomenon/syndrome. 2. Otherwise, no evidence of large vessel occlusion or significant stenosis in the remaining major arteries of the head and neck.          Electronically signed by Clari Cardozo MD on 12/23/2023 at 2:41 PM

## 2023-12-23 NOTE — PROGRESS NOTES
Occupational Therapy  [facility] ACUTE CARE OCCUPATIONAL THERAPY EVALUATION    Luz Ordoñez, 1940, 3108/3108-A, 12/23/2023    Discharge Recommendation: Home w/ assist prn      History:  [place]:  The primary encounter diagnosis was Dizziness. A diagnosis of Subclavian steal syndrome was also pertinent to this visit.  Past Medical History:   Diagnosis Date    COPD (chronic obstructive pulmonary disease) ([facility])        Subjective:  Patient states: \"I haven't been up yet\"  Pain:  denies  Communication with other providers: co-eval w/ PT, handoff to RN  Restrictions: General Precautions, Fall Risk    Home Setup/Prior level of function:  Social/Functional History  Lives With: Daughter  Type of Home: Trailer  Home Layout: One level  Home Access: Stairs to enter with rails  Entrance Stairs - Number of Steps: 4  Bathroom Shower/Tub: Tub/Shower unit  Bathroom Toilet: Handicap height  Bathroom Equipment: Shower chair  Home Equipment: Walker, rolling  ADL Assistance: Independent  Ambulation Assistance: Independent  Transfer Assistance: Independent  Active : No     Examination:  Observation: Supine in bed upon arrival, agreeable to therapy eval.  Vision: WFL  Hearing: slightly [place]  Vitals: Stable vitals throughout session on room air      Body Systems and functions:  ROM: WFL   Strength: B UE grossly 4/5 across all major joints   Sensation: WFL  Tone: Normal  Coordination: WFL  Perception: WNL      Cognitive and Psychosocial Functioning:  Overall cognitive status: alert and oriented, WFL  Affect: Normal       Functional Mobility:  Bed mobility:  supine to sitting EOB w/ SBA  Sitting balance:  SBA    Transfers: STS to/from EOB w/ SBA, stand to sit to recliner w/ SBA. Vcs for alignment and sequencing  Standing balance:  CGA w/o AD, SBA w/ RW  Functional Mobility: ambulated functional household distance x2 bouts, first CGA w/o AD and second SBA w/ RW per PT recommendation. Vcs for pathway and RW  mgmt  Toilet/Shower Transfers: NT        Activities of Daily Living (ADLs):  Feeding: set up A  Grooming: set up A  UB bathing: supervision  LB bathing: supervision  UB dressing: supervision  LB dressing: supervision  Toileting: supervision    *ADL determined per observation of functional mobility, balance, activity tolerance, cognition, or actual ADL performance. AM-PAC 6 click short form for inpatient daily activity:   How much help from another person does the patient currently need. .. Unable  Dep A Lot  Max A A Lot   Mod A A Little  Min A A Little   CGA  SBA None   Mod I  Indep  Sup   1. Putting on and taking off regular lower body clothing? [] 1    [] 2   [] 2   [] 3   [] 3   [x] 4      2. Bathing (including washing, rinsing, drying)? [] 1   [] 2   [] 2 [] 3 [] 3 [x] 4   3. Toileting, which includes using toilet, bedpan, or urinal? [] 1    [] 2   [] 2   [] 3   [] 3   [x] 4     4. Putting on and taking off regular upper body clothing? [] 1   [] 2   [] 2   [] 3   [] 3    [x] 4      5. Taking care of personal grooming such as brushing teeth? [] 1   [] 2    [] 2 [] 3    [] 3   [x] 4      6. Eating meals? [] 1   [] 2   [] 2   [] 3   [] 3   [x] 4        Raw Score:  24     [24=0% impaired(CH), 23=1-19%(CI), 20-22=20-39%(CJ), 15-19=40-59%(CK), 10-14=60-79%(CL), 7-9=80-99%(CM), 6=100%(CN)]     Treatment:    Therapeutic Activity Training:   Therapeutic activity training was instructed today. Cues were given for safety, sequence, UE/LE placement, awareness, and balance. Activities performed today included bed mobility training, sup-sit, sit-stand, ambulation.       Educated pt on role of OT, therapy POC and functional goals, progression w/ ADLs and transfers, importance of movement and OOB activity, d/c recommendations     Safety Measures: Gait belt used, Left in recliner, Alarm in place  Recommendations for NURSING activity:  Up to chair for all 3 meals and up to bathroom for all toileting needs

## 2023-12-23 NOTE — DISCHARGE INSTRUCTIONS
Medications: see computerized discharge medication list  Activity: as directed by interventional neurologist  Diet: {diet:89825} ***No diet orders on file  Wound Care: ***{wound care:33802}  Respiratory: ***  Disposition: {disposition:18248}  Discharged Condition: {STABLE/UNSTABLE:367802969}

## 2023-12-23 NOTE — ED PROVIDER NOTES
CHIEF COMPLAINT    Chief Complaint   Patient presents with    Dizziness      X 2 hours     HPI  José Luis Thompson is a 82 y.o. female with a tree of seasonal allergies and COPD who presents to the ED accompanied by her daughter with complaints of dizziness. Patient states around 3:30 AM she rolled over in bed and had sensation of dizziness. This is described as a sensation of the environment spinning around her with some associated nausea but no vomiting. She got up to use the restroom and states that positional changes made the symptoms worse. She states that she has a funny sensation to the back of her head as well. She is unable to further characterize sensation but tells me that it is not painful. She continued to have the symptoms of dizziness and therefore reported to the emergency department for further evaluation. She states that she had similar symptoms approximately 3 years ago but never received an answer to what was causing her symptoms. She states no previous history of MI or stroke. Currently rates symptoms as mild to moderate in severity. She states that her nausea is currently resolved although she does still continue to have the dizziness. Denies headache, numbness, tingling, focal weakness, slurred speech      REVIEW OF SYSTEMS  Constitutional: No fever, chills   Eye: No visual changes  HENT: No earache or sore throat. Resp: No SOB or productive cough. Cardio: No chest pain or palpitations. GI: No abdominal pain,constipation or diarrhea. No melena. Complains of nausea  : No dysuria, urgency or frequency. Endocrine: No heat intolerance, no cold intolerance, no polydipsia   Lymphatics: No adenopathy  Musculoskeletal: No new muscle aches or joint pain. Neuro: Complains of dizziness no headaches. Psych: No homicidal or suicidal thoughts  Skin: No rash, No itching. ?  ?   PAST MEDICAL HISTORY  Past Medical History:   Diagnosis Date    COPD (chronic obstructive pulmonary disease)

## 2023-12-24 LAB
ANION GAP SERPL CALCULATED.3IONS-SCNC: 10 MMOL/L (ref 7–16)
BASOPHILS ABSOLUTE: 0 K/CU MM
BASOPHILS RELATIVE PERCENT: 0.3 % (ref 0–1)
BUN SERPL-MCNC: 18 MG/DL (ref 6–23)
CALCIUM SERPL-MCNC: 8.4 MG/DL (ref 8.3–10.6)
CHLORIDE BLD-SCNC: 107 MMOL/L (ref 99–110)
CO2: 24 MMOL/L (ref 21–32)
CREAT SERPL-MCNC: 0.9 MG/DL (ref 0.6–1.1)
DIFFERENTIAL TYPE: ABNORMAL
EOSINOPHILS ABSOLUTE: 0.1 K/CU MM
EOSINOPHILS RELATIVE PERCENT: 1.3 % (ref 0–3)
GFR SERPL CREATININE-BSD FRML MDRD: >60 ML/MIN/1.73M2
GLUCOSE SERPL-MCNC: 98 MG/DL (ref 70–99)
HCT VFR BLD CALC: 40.5 % (ref 37–47)
HEMOGLOBIN: 13.1 GM/DL (ref 12.5–16)
IMMATURE NEUTROPHIL %: 2.2 % (ref 0–0.43)
LYMPHOCYTES ABSOLUTE: 1.5 K/CU MM
LYMPHOCYTES RELATIVE PERCENT: 15.6 % (ref 24–44)
MCH RBC QN AUTO: 29.4 PG (ref 27–31)
MCHC RBC AUTO-ENTMCNC: 32.3 % (ref 32–36)
MCV RBC AUTO: 91 FL (ref 78–100)
MONOCYTES ABSOLUTE: 3 K/CU MM
MONOCYTES RELATIVE PERCENT: 29.8 % (ref 0–4)
NUCLEATED RBC %: 0 %
PDW BLD-RTO: 13.6 % (ref 11.7–14.9)
PLATELET # BLD: 124 K/CU MM (ref 140–440)
PMV BLD AUTO: 10.2 FL (ref 7.5–11.1)
POTASSIUM SERPL-SCNC: 4.4 MMOL/L (ref 3.5–5.1)
RBC # BLD: 4.45 M/CU MM (ref 4.2–5.4)
SEGMENTED NEUTROPHILS ABSOLUTE COUNT: 5 K/CU MM
SEGMENTED NEUTROPHILS RELATIVE PERCENT: 50.8 % (ref 36–66)
SODIUM BLD-SCNC: 141 MMOL/L (ref 135–145)
TOTAL IMMATURE NEUTOROPHIL: 0.22 K/CU MM
TOTAL NUCLEATED RBC: 0 K/CU MM
WBC # BLD: 9.9 K/CU MM (ref 4–10.5)

## 2023-12-24 PROCEDURE — 80048 BASIC METABOLIC PNL TOTAL CA: CPT

## 2023-12-24 PROCEDURE — 85025 COMPLETE CBC W/AUTO DIFF WBC: CPT

## 2023-12-24 PROCEDURE — 36415 COLL VENOUS BLD VENIPUNCTURE: CPT

## 2023-12-24 PROCEDURE — 94640 AIRWAY INHALATION TREATMENT: CPT

## 2023-12-24 PROCEDURE — 97116 GAIT TRAINING THERAPY: CPT

## 2023-12-24 PROCEDURE — 96372 THER/PROPH/DIAG INJ SC/IM: CPT

## 2023-12-24 PROCEDURE — 6360000002 HC RX W HCPCS: Performed by: STUDENT IN AN ORGANIZED HEALTH CARE EDUCATION/TRAINING PROGRAM

## 2023-12-24 PROCEDURE — 94761 N-INVAS EAR/PLS OXIMETRY MLT: CPT

## 2023-12-24 PROCEDURE — 2580000003 HC RX 258: Performed by: STUDENT IN AN ORGANIZED HEALTH CARE EDUCATION/TRAINING PROGRAM

## 2023-12-24 PROCEDURE — G0378 HOSPITAL OBSERVATION PER HR: HCPCS

## 2023-12-24 PROCEDURE — 6370000000 HC RX 637 (ALT 250 FOR IP): Performed by: STUDENT IN AN ORGANIZED HEALTH CARE EDUCATION/TRAINING PROGRAM

## 2023-12-24 RX ADMIN — ASPIRIN 81 MG: 81 TABLET, CHEWABLE ORAL at 10:13

## 2023-12-24 RX ADMIN — BUDESONIDE AND FORMOTEROL FUMARATE DIHYDRATE 2 PUFF: 160; 4.5 AEROSOL RESPIRATORY (INHALATION) at 19:36

## 2023-12-24 RX ADMIN — BUDESONIDE AND FORMOTEROL FUMARATE DIHYDRATE 2 PUFF: 160; 4.5 AEROSOL RESPIRATORY (INHALATION) at 07:27

## 2023-12-24 RX ADMIN — MONTELUKAST 10 MG: 10 TABLET, FILM COATED ORAL at 10:13

## 2023-12-24 RX ADMIN — SODIUM CHLORIDE, PRESERVATIVE FREE 10 ML: 5 INJECTION INTRAVENOUS at 21:29

## 2023-12-24 RX ADMIN — IPRATROPIUM BROMIDE AND ALBUTEROL SULFATE 1 DOSE: 2.5; .5 SOLUTION RESPIRATORY (INHALATION) at 15:05

## 2023-12-24 RX ADMIN — SODIUM CHLORIDE, PRESERVATIVE FREE 10 ML: 5 INJECTION INTRAVENOUS at 10:17

## 2023-12-24 RX ADMIN — IPRATROPIUM BROMIDE AND ALBUTEROL SULFATE 1 DOSE: 2.5; .5 SOLUTION RESPIRATORY (INHALATION) at 11:20

## 2023-12-24 RX ADMIN — IPRATROPIUM BROMIDE AND ALBUTEROL SULFATE 1 DOSE: 2.5; .5 SOLUTION RESPIRATORY (INHALATION) at 07:27

## 2023-12-24 RX ADMIN — ATORVASTATIN CALCIUM 40 MG: 40 TABLET, FILM COATED ORAL at 21:28

## 2023-12-24 RX ADMIN — ENOXAPARIN SODIUM 40 MG: 100 INJECTION SUBCUTANEOUS at 10:13

## 2023-12-24 RX ADMIN — IPRATROPIUM BROMIDE AND ALBUTEROL SULFATE 1 DOSE: 2.5; .5 SOLUTION RESPIRATORY (INHALATION) at 19:35

## 2023-12-24 NOTE — PROGRESS NOTES
V2.0  [facility] Hospitalist Progress Note      Name:  Luz Ordoñez /Age/Sex: 1940  (83 y.o. female)   MRN & CSN:  [id number] & [id number] Encounter Date/Time: 2023 12:04 PM EST    Location:  [address] [place] [address] PCP: Kayla Redding PA       Hospital Day: 2    Assessment and Plan:   Luz Ordoñez is a 83 y.o. female with pmh of COPD, PVD  who presents with TIA (transient ischemic attack)      Plan:  Dizziness: likely related to peripheral vertigo vs related to severe focal stenosis in subclavian artery as seen on CTA head and neck,MRI brain wo contrast without any acute strokelike findings.  Neuro intervention consult, recommend cerebral angiogram probably on Tuesday continue aspirin check lipid panel, will add statin due to PAD, PT/OT consult recommends home with home health,   COPD: Continue home dose of inhalers not in exacerbation    Diet ADULT DIET; Regular; 3 carb choices (45 gm/meal); Low Fat/Low Chol/High Fiber/LARRY; Low Sodium (2 gm)   DVT Prophylaxis [] Lovenox, []  Heparin, [] SCDs, [] Ambulation,  [] Eliquis, [] Xarelto  [] Coumadin   Code Status Full Code   Disposition From: Home  Expected Disposition: Home w home health  Estimated Date of Discharge: 2 days  Patient requires continued admission due to Dizziness, cerebral angiogram on tuesday   Surrogate Decision Maker/ POA      Subjective:     Chief Complaint: Dizziness ( X 2 hours)       Luz Ordoñez is a 83 y.o. female who presents with Dizziness, still complains of dizziness on turning her head to the right as well as left.  Also has dizziness with looking down and looking up.         Review of Systems:    Review of Systems as above      Objective:     Intake/Output Summary (Last 24 hours) at 2023 1204  Last data filed at 2023  Gross per 24 hour   Intake 10 ml   Output --   Net 10 ml        Vitals:   Vitals:    23 1012   BP:    Pulse: 82   Resp:    Temp:    SpO2:        Physical Exam:   Physical Exam  stenosis. SOFT TISSUES: Mild emphysematous changes of the lungs with biapical scarring. No cervical or superior mediastinal lymphadenopathy. The larynx and pharynx are unremarkable. No acute abnormality of the salivary and thyroid glands. BONES: No acute osseous abnormality. Bone mineralization appears osteopenic/osteoporotic. Multilevel cervical spondylosis. CTA HEAD: ANTERIOR CIRCULATION: No significant stenosis of the intracranial internal carotid, anterior cerebral, or middle cerebral arteries. No aneurysm. POSTERIOR CIRCULATION: No significant stenosis of the vertebral, basilar, or posterior cerebral arteries. Mild focal stenosis the left vertebral artery V4 segment. No aneurysm. OTHER: No dural venous sinus thrombosis on this non-dedicated study. CT Brain: 1. No acute intracranial abnormality. 2. Involutional parenchymal changes with suggestion of microvascular ischemic disease. 3. Chronic left basal ganglia encephalomalacia/gliosis. CTA Head/Neck: 1. Severe focal stenosis of the proximal left subclavian artery prior to the vertebral artery takeoff; evaluation is limited by motion degradation. Possibility or free-floating thrombus cannot be excluded. Stenosis also raises the possibility of left-sided subclavian steal phenomenon/syndrome. 2. Otherwise, no evidence of large vessel occlusion or significant stenosis in the remaining major arteries of the head and neck. CTA HEAD NECK W CONTRAST    Result Date: 12/23/2023  EXAMINATION: CTA OF THE HEAD AND NECK WITH CONTRAST; CT OF THE HEAD WITHOUT CONTRAST 12/23/2023 6:01 am: TECHNIQUE: CTA of the head and neck was performed with the administration of intravenous contrast. Multiplanar reformatted images are provided for review. MIP images are provided for review. Stenosis of the internal carotid arteries measured using NASCET criteria.  Automated exposure control, iterative reconstruction, and/or weight based adjustment of the mA/kV was utilized to

## 2023-12-24 NOTE — PROGRESS NOTES
Possible subclavian steal syndrome based on CTA. Clinical history is less clear. She reports positional near syncope at times. Did not report claudication. Will do angio on Tuesday for evaluation of left pre vertebral subclavian stenosis. Discussed this wit patient and her two daughters.

## 2023-12-24 NOTE — PROGRESS NOTES
Physical Therapy    Physical Therapy Treatment Note  Name: Michael Flores MRN: [id number] :   1940   Date:  2023   Admission Date: 2023 Room:  [address] [place] [address]-   Restrictions/Precautions:  Restrictions/Precautions  Restrictions/Precautions: General Precautions           Subjective:  Patient states:  \"I would like to walk in the hallway; I need to go to the bathroom\"  Pain:   Location, Type, Intensity (0/10 to 10/10):  denies; 0/10    Objective:    Observation:  pt supine in bed upon entry    Treatment, including education/measures:  Pt agreeable to participating in therapy at this time. Therapeutic Activity Training:   Therapeutic activity training was instructed today. Cues were given for safety, sequence, UE/LE placement, awareness, and balance. Activities performed today included bed mobility training, sup-sit, sit-stand, SPT. Pt completed supine to sit with supervision. Pt completed sit <> stand transfers to/from bed/chair/toilet with supervision. Increased time for task completion; patient requested to use bathroom after ambulation. Gait Training:  Cues were given for safety, sequence, device management, balance, posture, awareness, path. Pt ambulated 150 feet + 75 feet + 10 feet with SBAx1 with a front wheeled walker with a decreased gait speed and a decreased step length bilaterally. Pt provided with initial verbal and tactile cues for BLE placement, walker placement, and sequence. Pt provided with initial verbal and tactile cues to maintain upright posture in order to avoid COM shifting outside of PHAM. Pt provided with initial verbal cues for directions in order to successfully navigate hallway and return to correct room. Safety  Patient left safely in the chair, with call light/phone in reach with alarm applied. Gait belt was used for transfers and gait.     Assessment / Impression:    Patient's tolerance of treatment:  good; patient tolerated ambulation in hallway today   Adverse Reaction: none  Significant change in status and impact:  none  Barriers to improvement:  n/a    Plan for Next Session:    Continue progressing toward goals per plan of care.  Progress ambulation distance as tolerated and appropriate.  Progress independence with transfers and ambulation as tolerated and appropriate.        Time in:  1347  Time out:  1411  Timed treatment minutes:  24  Total treatment time:  24    Previously filed items:  Social/Functional History  Lives With: Daughter  Type of Home: Trailer  Home Layout: One level  Home Access: Stairs to enter with rails  Entrance Stairs - Number of Steps: 4  Bathroom Shower/Tub: Tub/Shower unit  Bathroom Toilet: Handicap height  Bathroom Equipment: Shower chair  Home Equipment: Walker, rolling  ADL Assistance: Independent  Ambulation Assistance: Independent  Transfer Assistance: Independent  Active : No     Long Term Goals  Time Frame for Long Term Goals : In one week:  Long Term Goal 1: Pt will ambulate 600 feet with modified independence with LRAD  Long Term Goal 2: Pt will independently ascend/descend 6 steps with a handrail  Long Term Goal 3: Pt will independently complete 3 sets of 10 reps of BLE AROM exercises       Electronically signed by:    Lkue Gimenez PT, DPT  License #: [id number]

## 2023-12-25 PROBLEM — G45.8 SUBCLAVIAN STEAL SYNDROME: Status: ACTIVE | Noted: 2023-12-25

## 2023-12-25 PROCEDURE — 94761 N-INVAS EAR/PLS OXIMETRY MLT: CPT

## 2023-12-25 PROCEDURE — 96372 THER/PROPH/DIAG INJ SC/IM: CPT

## 2023-12-25 PROCEDURE — 2580000003 HC RX 258: Performed by: STUDENT IN AN ORGANIZED HEALTH CARE EDUCATION/TRAINING PROGRAM

## 2023-12-25 PROCEDURE — 6370000000 HC RX 637 (ALT 250 FOR IP): Performed by: STUDENT IN AN ORGANIZED HEALTH CARE EDUCATION/TRAINING PROGRAM

## 2023-12-25 PROCEDURE — 6360000002 HC RX W HCPCS: Performed by: STUDENT IN AN ORGANIZED HEALTH CARE EDUCATION/TRAINING PROGRAM

## 2023-12-25 PROCEDURE — G0378 HOSPITAL OBSERVATION PER HR: HCPCS

## 2023-12-25 PROCEDURE — 1200000000 HC SEMI PRIVATE

## 2023-12-25 PROCEDURE — 94640 AIRWAY INHALATION TREATMENT: CPT

## 2023-12-25 RX ORDER — CLOPIDOGREL BISULFATE 75 MG/1
300 TABLET ORAL ONCE
Status: COMPLETED | OUTPATIENT
Start: 2023-12-25 | End: 2023-12-25

## 2023-12-25 RX ORDER — CLOPIDOGREL BISULFATE 75 MG/1
75 TABLET ORAL DAILY
Status: DISCONTINUED | OUTPATIENT
Start: 2023-12-26 | End: 2023-12-26

## 2023-12-25 RX ORDER — FLUTICASONE PROPIONATE 50 MCG
1 SPRAY, SUSPENSION (ML) NASAL DAILY
Status: DISCONTINUED | OUTPATIENT
Start: 2023-12-25 | End: 2023-12-27 | Stop reason: HOSPADM

## 2023-12-25 RX ADMIN — IPRATROPIUM BROMIDE AND ALBUTEROL SULFATE 1 DOSE: 2.5; .5 SOLUTION RESPIRATORY (INHALATION) at 15:23

## 2023-12-25 RX ADMIN — ENOXAPARIN SODIUM 40 MG: 100 INJECTION SUBCUTANEOUS at 09:11

## 2023-12-25 RX ADMIN — FLUTICASONE PROPIONATE 1 SPRAY: 50 SPRAY, METERED NASAL at 12:30

## 2023-12-25 RX ADMIN — MONTELUKAST 10 MG: 10 TABLET, FILM COATED ORAL at 09:11

## 2023-12-25 RX ADMIN — IPRATROPIUM BROMIDE AND ALBUTEROL SULFATE 1 DOSE: 2.5; .5 SOLUTION RESPIRATORY (INHALATION) at 11:08

## 2023-12-25 RX ADMIN — CLOPIDOGREL BISULFATE 300 MG: 75 TABLET ORAL at 18:03

## 2023-12-25 RX ADMIN — ASPIRIN 81 MG: 81 TABLET, CHEWABLE ORAL at 09:11

## 2023-12-25 RX ADMIN — SODIUM CHLORIDE, PRESERVATIVE FREE 10 ML: 5 INJECTION INTRAVENOUS at 20:29

## 2023-12-25 RX ADMIN — BUDESONIDE AND FORMOTEROL FUMARATE DIHYDRATE 2 PUFF: 160; 4.5 AEROSOL RESPIRATORY (INHALATION) at 20:49

## 2023-12-25 RX ADMIN — IPRATROPIUM BROMIDE AND ALBUTEROL SULFATE 1 DOSE: 2.5; .5 SOLUTION RESPIRATORY (INHALATION) at 20:48

## 2023-12-25 RX ADMIN — BUDESONIDE AND FORMOTEROL FUMARATE DIHYDRATE 2 PUFF: 160; 4.5 AEROSOL RESPIRATORY (INHALATION) at 07:23

## 2023-12-25 RX ADMIN — IPRATROPIUM BROMIDE AND ALBUTEROL SULFATE 1 DOSE: 2.5; .5 SOLUTION RESPIRATORY (INHALATION) at 07:22

## 2023-12-25 RX ADMIN — ATORVASTATIN CALCIUM 40 MG: 40 TABLET, FILM COATED ORAL at 20:29

## 2023-12-25 RX ADMIN — SODIUM CHLORIDE, PRESERVATIVE FREE 10 ML: 5 INJECTION INTRAVENOUS at 09:11

## 2023-12-25 NOTE — PROGRESS NOTES
V2.0  [facility] Hospitalist Progress Note      Name:  David Gore /Age/Sex: 1940  (82 y.o. female)   MRN & CSN:  [id number] & [id number] Encounter Date/Time: 2023 12:04 PM EST    Location:  [address] [place] [address] PCP: Luis Carlos [place], [address] [place] [address],[address] Day: 3    Assessment and Plan:   David Gore is a 82 y.o. female with pmh of COPD, PVD  who presents with TIA (transient ischemic attack)      Plan:  Dizziness/vertigo: likely related to peripheral vertigo vs less likely related to severe focal stenosis in subclavian artery as seen on CTA head and neck,MRI brain wo contrast without any acute strokelike findings. Neuro intervention consult, recommend cerebral angiogram probably on Tuesday continue aspirin check lipid panel, will add statin due to PAD, PT/OT consult recommends home with home health,   COPD: Continue home dose of inhalers not in exacerbation    Diet ADULT DIET; Regular; 3 carb choices (45 gm/meal); Low Fat/Low Chol/High Fiber/LARRY; Low Sodium (2 gm)   DVT Prophylaxis [] Lovenox, []  Heparin, [] SCDs, [] Ambulation,  [] Eliquis, [] Xarelto  [] Coumadin   Code Status Full Code   Disposition From: Home  Expected Disposition: Home w home health  Estimated Date of Discharge: 1-2 day  Patient requires continued admission due to Dizziness, cerebral angiogram on Tuesday, if negative can be discharged   Surrogate Decision Maker/ POA      Subjective:     Chief Complaint: Dizziness ( X 2 hours)       David Gore is a 82 y.o. female who presents with Dizziness, still complains of dizziness on turning her head to the right and looking down. Ldid work with PT yesterday c/o stuffy nose this morning as well       Review of Systems:    Review of Systems as above      Objective:      Intake/Output Summary (Last 24 hours) at 2023 1041  Last data filed at 2023 2030  Gross per 24 hour   Intake 320 ml   Output --   Net 320 ml          Vitals:   Vitals:    23 0900   BP: 119/64   Pulse: SOFT TISSUES/SKULL: No acute abnormality of the visualized skull or soft tissues. CTA NECK: AORTIC ARCH/ARCH VESSELS: No dissection or arterial injury.  Severe focal stenosis of the proximal left subclavian artery prior to the vertebral artery takeoff; evaluation is limited by motion degradation. CAROTID ARTERIES: No dissection, arterial injury, or hemodynamically significant stenosis by NASCET criteria. VERTEBRAL ARTERIES: No dissection, arterial injury, or significant stenosis. SOFT TISSUES: Mild emphysematous changes of the lungs with biapical scarring. No cervical or superior mediastinal lymphadenopathy.  The larynx and pharynx are unremarkable.  No acute abnormality of the salivary and thyroid glands. BONES: No acute osseous abnormality.  Bone mineralization appears osteopenic/osteoporotic.  Multilevel cervical spondylosis. CTA HEAD: ANTERIOR CIRCULATION: No significant stenosis of the intracranial internal carotid, anterior cerebral, or middle cerebral arteries. No aneurysm. POSTERIOR CIRCULATION: No significant stenosis of the vertebral, basilar, or posterior cerebral arteries.  Mild focal stenosis the left vertebral artery V4 segment.  No aneurysm. OTHER: No dural venous sinus thrombosis on this non-dedicated study.     CT Brain: 1. No acute intracranial abnormality. 2. Involutional parenchymal changes with suggestion of microvascular ischemic disease. 3. Chronic left basal ganglia encephalomalacia/gliosis. CTA Head/Neck: 1. Severe focal stenosis of the proximal left subclavian artery prior to the vertebral artery takeoff; evaluation is limited by motion degradation. Possibility or free-floating thrombus cannot be excluded.  Stenosis also raises the possibility of left-sided subclavian steal phenomenon/syndrome. 2. Otherwise, no evidence of large vessel occlusion or significant stenosis in the remaining major arteries of the head and neck.     CTA HEAD NECK W CONTRAST    Result Date: 12/23/2023  EXAMINATION:

## 2023-12-25 NOTE — PROGRESS NOTES
Neuro IR, Dr. Dee Andrade consulting. No need for neurosurgery at this time. Call with questions or concerns.

## 2023-12-26 ENCOUNTER — APPOINTMENT (OUTPATIENT)
Dept: INTERVENTIONAL RADIOLOGY/VASCULAR | Age: 83
DRG: 068 | End: 2023-12-26
Payer: MEDICARE

## 2023-12-26 PROCEDURE — 2580000003 HC RX 258: Performed by: STUDENT IN AN ORGANIZED HEALTH CARE EDUCATION/TRAINING PROGRAM

## 2023-12-26 PROCEDURE — 36224 PLACE CATH CAROTD ART: CPT | Performed by: PSYCHIATRY & NEUROLOGY

## 2023-12-26 PROCEDURE — 1200000000 HC SEMI PRIVATE

## 2023-12-26 PROCEDURE — B31G1ZZ FLUOROSCOPY OF BILATERAL VERTEBRAL ARTERIES USING LOW OSMOLAR CONTRAST: ICD-10-PCS | Performed by: PSYCHIATRY & NEUROLOGY

## 2023-12-26 PROCEDURE — 2580000003 HC RX 258: Performed by: NURSE PRACTITIONER

## 2023-12-26 PROCEDURE — 99152 MOD SED SAME PHYS/QHP 5/>YRS: CPT

## 2023-12-26 PROCEDURE — 36227 PLACE CATH XTRNL CAROTID: CPT

## 2023-12-26 PROCEDURE — 6360000002 HC RX W HCPCS: Performed by: STUDENT IN AN ORGANIZED HEALTH CARE EDUCATION/TRAINING PROGRAM

## 2023-12-26 PROCEDURE — 36224 PLACE CATH CAROTD ART: CPT

## 2023-12-26 PROCEDURE — 6370000000 HC RX 637 (ALT 250 FOR IP): Performed by: STUDENT IN AN ORGANIZED HEALTH CARE EDUCATION/TRAINING PROGRAM

## 2023-12-26 PROCEDURE — B3151ZZ FLUOROSCOPY OF BILATERAL COMMON CAROTID ARTERIES USING LOW OSMOLAR CONTRAST: ICD-10-PCS | Performed by: PSYCHIATRY & NEUROLOGY

## 2023-12-26 PROCEDURE — 6360000002 HC RX W HCPCS: Performed by: PSYCHIATRY & NEUROLOGY

## 2023-12-26 PROCEDURE — 36226 PLACE CATH VERTEBRAL ART: CPT | Performed by: PSYCHIATRY & NEUROLOGY

## 2023-12-26 PROCEDURE — 36227 PLACE CATH XTRNL CAROTID: CPT | Performed by: PSYCHIATRY & NEUROLOGY

## 2023-12-26 PROCEDURE — B41F1ZZ FLUOROSCOPY OF RIGHT LOWER EXTREMITY ARTERIES USING LOW OSMOLAR CONTRAST: ICD-10-PCS | Performed by: PSYCHIATRY & NEUROLOGY

## 2023-12-26 PROCEDURE — 94761 N-INVAS EAR/PLS OXIMETRY MLT: CPT

## 2023-12-26 PROCEDURE — 2500000003 HC RX 250 WO HCPCS: Performed by: PSYCHIATRY & NEUROLOGY

## 2023-12-26 PROCEDURE — 36226 PLACE CATH VERTEBRAL ART: CPT

## 2023-12-26 PROCEDURE — 2709999900 IR ANGIOGRAM CAROTID CEREBRAL BILATERAL

## 2023-12-26 PROCEDURE — 99153 MOD SED SAME PHYS/QHP EA: CPT

## 2023-12-26 PROCEDURE — 94640 AIRWAY INHALATION TREATMENT: CPT

## 2023-12-26 PROCEDURE — 99223 1ST HOSP IP/OBS HIGH 75: CPT | Performed by: NURSE PRACTITIONER

## 2023-12-26 PROCEDURE — B3181ZZ FLUOROSCOPY OF BILATERAL INTERNAL CAROTID ARTERIES USING LOW OSMOLAR CONTRAST: ICD-10-PCS | Performed by: PSYCHIATRY & NEUROLOGY

## 2023-12-26 PROCEDURE — B31C1ZZ FLUOROSCOPY OF BILATERAL EXTERNAL CAROTID ARTERIES USING LOW OSMOLAR CONTRAST: ICD-10-PCS | Performed by: PSYCHIATRY & NEUROLOGY

## 2023-12-26 PROCEDURE — C1769 GUIDE WIRE: HCPCS

## 2023-12-26 PROCEDURE — B3121ZZ FLUOROSCOPY OF LEFT SUBCLAVIAN ARTERY USING LOW OSMOLAR CONTRAST: ICD-10-PCS | Performed by: PSYCHIATRY & NEUROLOGY

## 2023-12-26 RX ORDER — MIDAZOLAM HYDROCHLORIDE 2 MG/2ML
INJECTION, SOLUTION INTRAMUSCULAR; INTRAVENOUS PRN
Status: COMPLETED | OUTPATIENT
Start: 2023-12-26 | End: 2023-12-26

## 2023-12-26 RX ORDER — ONDANSETRON 4 MG/1
4 TABLET, ORALLY DISINTEGRATING ORAL EVERY 8 HOURS PRN
Status: DISCONTINUED | OUTPATIENT
Start: 2023-12-26 | End: 2023-12-27 | Stop reason: HOSPADM

## 2023-12-26 RX ORDER — SODIUM CHLORIDE 9 MG/ML
INJECTION, SOLUTION INTRAVENOUS PRN
Status: DISCONTINUED | OUTPATIENT
Start: 2023-12-26 | End: 2023-12-27 | Stop reason: HOSPADM

## 2023-12-26 RX ORDER — ACETAMINOPHEN 325 MG/1
650 TABLET ORAL EVERY 4 HOURS PRN
Status: DISCONTINUED | OUTPATIENT
Start: 2023-12-26 | End: 2023-12-27 | Stop reason: HOSPADM

## 2023-12-26 RX ORDER — FENTANYL CITRATE 50 UG/ML
INJECTION, SOLUTION INTRAMUSCULAR; INTRAVENOUS PRN
Status: COMPLETED | OUTPATIENT
Start: 2023-12-26 | End: 2023-12-26

## 2023-12-26 RX ORDER — LIDOCAINE HYDROCHLORIDE 10 MG/ML
INJECTION, SOLUTION EPIDURAL; INFILTRATION; INTRACAUDAL; PERINEURAL PRN
Status: COMPLETED | OUTPATIENT
Start: 2023-12-26 | End: 2023-12-26

## 2023-12-26 RX ORDER — ONDANSETRON 2 MG/ML
4 INJECTION INTRAMUSCULAR; INTRAVENOUS EVERY 6 HOURS PRN
Status: DISCONTINUED | OUTPATIENT
Start: 2023-12-26 | End: 2023-12-27 | Stop reason: HOSPADM

## 2023-12-26 RX ORDER — SODIUM CHLORIDE 0.9 % (FLUSH) 0.9 %
5-40 SYRINGE (ML) INJECTION EVERY 12 HOURS SCHEDULED
Status: DISCONTINUED | OUTPATIENT
Start: 2023-12-26 | End: 2023-12-27 | Stop reason: HOSPADM

## 2023-12-26 RX ORDER — SODIUM CHLORIDE 0.9 % (FLUSH) 0.9 %
5-40 SYRINGE (ML) INJECTION PRN
Status: DISCONTINUED | OUTPATIENT
Start: 2023-12-26 | End: 2023-12-27 | Stop reason: HOSPADM

## 2023-12-26 RX ADMIN — ASPIRIN 81 MG: 81 TABLET, CHEWABLE ORAL at 09:56

## 2023-12-26 RX ADMIN — MIDAZOLAM HYDROCHLORIDE 0.5 MG: 1 INJECTION, SOLUTION INTRAMUSCULAR; INTRAVENOUS at 14:10

## 2023-12-26 RX ADMIN — MONTELUKAST 10 MG: 10 TABLET, FILM COATED ORAL at 09:56

## 2023-12-26 RX ADMIN — LIDOCAINE HYDROCHLORIDE 6.5 ML: 10 INJECTION, SOLUTION EPIDURAL; INFILTRATION; INTRACAUDAL; PERINEURAL at 14:19

## 2023-12-26 RX ADMIN — ENOXAPARIN SODIUM 40 MG: 100 INJECTION SUBCUTANEOUS at 09:56

## 2023-12-26 RX ADMIN — IPRATROPIUM BROMIDE AND ALBUTEROL SULFATE 1 DOSE: 2.5; .5 SOLUTION RESPIRATORY (INHALATION) at 08:05

## 2023-12-26 RX ADMIN — SODIUM CHLORIDE, PRESERVATIVE FREE 10 ML: 5 INJECTION INTRAVENOUS at 09:56

## 2023-12-26 RX ADMIN — SODIUM CHLORIDE, PRESERVATIVE FREE 10 ML: 5 INJECTION INTRAVENOUS at 20:06

## 2023-12-26 RX ADMIN — FENTANYL CITRATE 25 MCG: 50 INJECTION, SOLUTION INTRAMUSCULAR; INTRAVENOUS at 14:10

## 2023-12-26 RX ADMIN — CLOPIDOGREL BISULFATE 75 MG: 75 TABLET ORAL at 09:56

## 2023-12-26 RX ADMIN — ATORVASTATIN CALCIUM 40 MG: 40 TABLET, FILM COATED ORAL at 20:06

## 2023-12-26 ASSESSMENT — PAIN SCALES - GENERAL
PAINLEVEL_OUTOF10: 0
PAINLEVEL_OUTOF10: 0

## 2023-12-26 NOTE — PROGRESS NOTES
TRANSFER - OUT REPORT:    Verbal report given to Geovanna LY on Liberty Hess being transferred to [facility] for routine progression of patient care       Report consisted of patient's Situation, Background, Assessment and   Recommendations(SBAR). Information from the following report(s) Nurse Handoff Report was reviewed with the receiving nurse. Opportunity for questions and clarification was provided.       Patient transported with:   Registered Nurse

## 2023-12-26 NOTE — CONSULTS
acetaminophen **OR** acetaminophen, meclizine    No Known Allergies  Social History     Socioeconomic History    Marital status:      Spouse name: Not on file    Number of children: Not on file    Years of education: Not on file    Highest education level: Not on file   Occupational History    Not on file   Tobacco Use    Smoking status: Some Days     Current packs/day: 0.50     Average packs/day: 0.5 packs/day for 39.3 years (19.7 ttl pk-yrs)     Types: Cigarettes     Start date: 8/30/1984    Smokeless tobacco: Never   Substance and Sexual Activity    Alcohol use: Never    Drug use: Never    Sexual activity: Not on file   Other Topics Concern    Not on file   Social History Narrative    Not on file     Social Determinants of Health     Financial Resource Strain: Not on file   Food Insecurity: Not on file   Transportation Needs: Not on file   Physical Activity: Not on file   Stress: Not on file   Social Connections: Not on file   Intimate Partner Violence: Not on file   Housing Stability: Not on file      History reviewed. No pertinent family history. Review of Symptoms:    10-point system review completed. All of which are negative except as mentioned above. Physical Exam:       @[facility]@     Gen: A&O x 4, NAD, cooperative  HEENT: NC/AT, EOMI, PERRL, mmm  Heart: RRR, S1S2  Lungs: CTAB  Ext: no edema, no calf tenderness b/l  Psych: normal mood and affect  Skin: no rashes or lesions    NEUROLOGIC EXAM:    Mental Status: A&O to self, location, month and year, NAD, speech clear, language fluent, repetition and naming intact, follows commands appropriately    Cranial Nerve Exam:   CN II-XII PERRL, VFF, no nystagmus, no gaze paresis, sensation V1-V3 intact b/l, muscles of facial expression symmetric; hearing intact to conversational tone, palate elevates symmetrically, shoulder elevation symmetric and tongue protrudes midline with movement side to side.     Motor Exam:       Strength 5/5 UE's/LE's b/l  Tone diagnostic cerebral angiogram with possible intervention today.  -Continue aspirin and Plavix.  Patient was loaded with Plavix 12/25  -Continue statin for secondary prevention of stroke     The risk and benefits of the procedure have been discussed.  There is a 0.5% risk of serious injury to the CFA or groin area that could require blood transfusion or surgery.  There is a 10% risk of mild hematoma in the groin area.  For diagnostic cases there is a 1% risk of stroke or serious complication, for intervention cases there is a less 3% risk of stroke or serious complication, for ischemic stroke interventions there is a 10% risk of stroke or worsening.  Other risk include but are not limited to infection, dissection, radiation injury, hemorrhage, contrast reactions, nephrotoxicity and death.      Thank you for allowing us to participate in the care of your patient.  If there are any questions regarding evaluation please feel free to contact us.     GABI Adame - CNP, 12/26/2023

## 2023-12-26 NOTE — OR NURSING
Pt had a cerebral angiogram by Dr Alana Thompson. Pt received . 5 mg of versed and 25 mcg of fentanyl. Pt tolerated procedure, vss, and pt alert and oriented x4. Angioseal in place, dressing clean dry and intact.

## 2023-12-26 NOTE — PROGRESS NOTES
V2.0  [facility] [place]      Name:  uLz Ordoñez /Age/Sex: 1940  (83 y.o. female)   MRN & CSN:  [id number] & [id number] Encounter Date/Time: 2023 9:10 AM EST    Location:  [address] [place] [address] PCP: Kayla Redding PA       Hospital Day: 4    Assessment and Plan:     Luz Ordoñez is a 83 y.o. female with pmh of COPD, PVD  who presents with TIA (transient ischemic attack)        Plan:  Dizziness/vertigo:   likely related to peripheral vertigo vs less likely related to severe focal stenosis in subclavian artery as seen on CTA head and neck,MRI brain wo contrast without any acute strokelike findings.   continue aspirin check lipid panel, will add statin due to PAD, PT/OT consult recommends home with home health,   -Angiogram/26: Less than 50% stenosis of left prevertebral subclavian artery.  No evidence of subclavian steal syndrome.  Interventional neurology recommends stopping Plavix.  COPD:   Continue home dose of inhalers not in exacerbation     Diet ADULT DIET; Regular; 3 carb choices (45 gm/meal); Low Fat/Low Chol/High Fiber/LARRY; Low Sodium (2 gm)    DVT Prophylaxis [x] Lovenox, [] Heparin, [] Eliquis, [] Xarelto  [] SCDs       Code Status Full Code   Disposition Expected Disposition: [facility]  Estimated Date of Discharge:   Patient requires continued admission due to dizziness         Personally reviewed Lab Studies and Imaging     Subjective/Interval history:   Chief Complaint: Dizziness ( X 2 hours)     Patient states she is doing ok  Had procedure earlier today    Review of Systems:    Negative unless mentioned above    Objective:     Intake/Output Summary (Last 24 hours) at 2023 0910  Last data filed at 2023  Gross per 24 hour   Intake 80 ml   Output --   Net 80 ml        Vitals:   BP (!) 149/66   Pulse 82   Temp 98 °F (36.7 °C) (Oral)   Resp 18   Ht 1.6 m (5' 2.99\")   Wt 77.5 kg (170 lb 13.7 oz)   SpO2 95%   BMI 30.27 kg/m²     Physical Exam:  General: NAD, laying in bed, hard of hearing  Eyes: no discharge  HENT: NCAT  Cardiovascular: RRR  Respiratory: Clear to auscultation   Gastrointestinal: Soft, non tender, nondistended  Genitourinary: no suprapubic tenderness  Musculoskeletal: No edema, nontender  Skin: warm, dry, no gross lesions  Neuro: Alert. No gross deficits  Psych: Mood appropriate. Medications:   Medications:    fluticasone  1 spray Each Nostril Daily    clopidogrel  75 mg Oral Daily    ipratropium 0.5 mg-albuterol 2.5 mg  1 Dose Inhalation 4x Daily RT    montelukast  10 mg Oral Daily    aspirin  81 mg Oral Daily    sodium chloride flush  5-40 mL IntraVENous 2 times per day    enoxaparin  40 mg SubCUTAneous Daily    atorvastatin  40 mg Oral Nightly    tiotropium  2 puff Inhalation Daily RT    budesonide-formoterol  2 puff Inhalation BID      Infusions:    sodium chloride       PRN Meds: albuterol sulfate HFA, 2 puff, Q6H PRN  sodium chloride flush, 5-40 mL, PRN  sodium chloride, , PRN  potassium chloride, 40 mEq, PRN   Or  potassium alternative oral replacement, 40 mEq, PRN   Or  potassium chloride, 10 mEq, PRN  magnesium sulfate, 2,000 mg, PRN  ondansetron, 4 mg, Q8H PRN   Or  ondansetron, 4 mg, Q6H PRN  polyethylene glycol, 17 g, Daily PRN  acetaminophen, 650 mg, Q6H PRN   Or  acetaminophen, 650 mg, Q6H PRN  meclizine, 12.5 mg, TID PRN        Labs      Recent Labs     12/24/23  0525   WBC 9.9   HGB 13.1   HCT 40.5   *      Recent Labs     12/24/23  0525      K 4.4      CO2 24   BUN 18   CREATININE 0.9     No results for input(s): \"AST\", \"ALT\", \"ALB\", \"BILIDIR\", \"BILITOT\", \"ALKPHOS\" in the last 72 hours. No results for input(s): \"INR\" in the last 72 hours. No results for input(s): \"CKTOTAL\", \"CKMB\", \"CKMBINDEX\", \"TROPONINT\" in the last 72 hours.   Lab Results   Component Value Date/Time    LABA1C 5.9 12/23/2023 04:57 PM     CALCIUM:  8.4/24 (12/24 0525)  No results found for: \"MG\"        Electronically signed by

## 2023-12-26 NOTE — OP NOTE
Cerebral and Cervical Angiogram    Danya Luo  1940     Procedure Date:  12/26/23    Access:  Right common femoral artery    Closure:  Angioseal    Indication: prevertebral stenosis    No significant blood loss  Malampati 2    Procedures performed:  Ultrasound-guided needle access into the right common femoral artery  Right common femoral artery angiogram with Angio-Seal for closure  Selective bilateral biplane carotid artery cervical angiograms  Selective bilateral biplane internal carotid artery cerebral angiograms  Selective bilateral biplane external carotid artery head angiograms  Selective bilateral biplane subclavian and vertebral cerebral angiograms    Procedure: The patient was appropriately consented. The patient was brought to the angiography suite laid in a supine position on the angiography table. The right groin was prepped and draped in a normal sterile fashion. Lidocaine was used for local anesthetic. Fentanyl and Versed were used for conscious sedation. A micropuncture was used with ultrasound-guided needle access into the right common femoral artery. A 5 Hebrew sheath was then placed. Next a 5 [place] diagnostic catheter was brought up over an angled Glidewire into the aortic arch and used to catheter the great vessels under fluoroscopic guidance. The catheter was brought into the right common carotid artery and a biplane cervical angiogram was done which shows contrast flowing antegrade direction to the common internal and external carotid arteries. There is no significant stenosis. Next the catheter was taken into the right external carotid artery head angiogram was done which shows contrast flowing antegrade direction to the external carotid artery and its branches including the internal maxillary,superficial temporal artery, middle meningeal artery and occipital arteries. No abnormalities were identified.     Next the catheter was taken into the right internal carotid artery cerebral angiogram was done which shows contrast flowing in antegrade direction to the intracranial portion of the internal carotid artery filling the middle cerebral artery and anterior cerebral artery.  There is cross-filling through a patent intra communicating artery to the contralateral anterior cerebral artery.  There are diffuse mild luminal irregularity seen in the anterior cerebral arteries and middle cerebral artery consistent with atherosclerotic change.  No flow-limiting stenosis is identified.  There is a large posterior communicating artery with filling into the posterior cerebral artery.  Capillary and venous phases are within normal limits.    The catheter was taken into the right subclavian artery and angiogram was done which shows no significant stenosis there is antegrade flow through the vertebral artery no steal phenomenon is identified.  Next a cerebral angiogram was done with a catheter in the right vertebral artery which shows contrast flowing an antegrade direction to the intracranial portion of the right vertebral artery filling the basilar artery and bilateral posterior cerebral arteries.  Mild to moderate atherosclerotic changes identified diffusely.  There is a 50% stenosis of the P1 segment of the right posterior cerebral artery.  Capillary and venous phases are unremarkable.    The catheter was taken into the left subclavian artery and an angiogram was done which shows antegrade flow in the subclavian artery and into the vertebral artery.  There is less than a 50% stenosis of the proximal portion of the subclavian artery that is prevertebral.  This does not appear to be flow-limiting.  Next the catheter was taken into the left vertebral artery and a cerebral angiogram was done which shows contrast flowing an antegrade direction to the intracranial portion of the left vertebral artery filling the basilar artery and bilateral posterior cerebral arteries.  Atherosclerotic changes

## 2023-12-26 NOTE — PROGRESS NOTES
Pt arrived to [facility] 12 prior to planned cerebral angiogram.  Second IV inserted. Bilateral groins prepped. Vitals WNL. Confirmed pt received aspirin/plavix this morning. Consent was signed.

## 2023-12-26 NOTE — PROGRESS NOTES
4 Eyes Skin Assessment     NAME:  Dvaid Gore  YOB: 1940  MEDICAL RECORD NUMBER:  [id number]    The patient is being assessed for  Other post angiogram in IR    I agree that at least one RN has performed a thorough Head to Toe Skin Assessment on the patient. ALL assessment sites listed below have been assessed. Areas assessed by both nurses:    Head, Face, Ears, Shoulders, Back, Chest, Arms, Elbows, Hands, Sacrum. Buttock, Coccyx, Ischium, and Legs. Feet and Heels        Does the Patient have a Wound?  No noted wound(s)       Erick Prevention initiated by RN: No  Wound Care Orders initiated by RN: No    Pressure Injury (Stage 3,4, Unstageable, DTI, NWPT, and Complex wounds) if present, place Wound referral order by RN under : No    New Ostomies, if present place, Ostomy referral order under : No     Nurse 1 eSignature: Electronically signed by MARIA R Zarate RN on 12/26/23 at 5:26 PM EST    **SHARE this note so that the co-signing nurse can place an eSignature**    Nurse 2 eSignature: {Esignature:[id number]}

## 2023-12-26 NOTE — OR NURSING
Dr Margarita Merritt aware of the patients , received aspirin, plavix, and lovenox this am.  Ok to proceed with the procedure.

## 2023-12-26 NOTE — PROGRESS NOTES
Procedure time:  Contrast volume: 100cc of isovue 370  Fluoro time: 7.6  Dap: 84  AK:323    Report given to [facility]

## 2023-12-27 VITALS
HEART RATE: 89 BPM | TEMPERATURE: 98.3 F | SYSTOLIC BLOOD PRESSURE: 129 MMHG | BODY MASS INDEX: 29.49 KG/M2 | DIASTOLIC BLOOD PRESSURE: 52 MMHG | HEIGHT: 63 IN | WEIGHT: 166.45 LBS | OXYGEN SATURATION: 93 % | RESPIRATION RATE: 19 BRPM

## 2023-12-27 PROCEDURE — 2580000003 HC RX 258: Performed by: NURSE PRACTITIONER

## 2023-12-27 PROCEDURE — 94640 AIRWAY INHALATION TREATMENT: CPT

## 2023-12-27 PROCEDURE — 2580000003 HC RX 258: Performed by: STUDENT IN AN ORGANIZED HEALTH CARE EDUCATION/TRAINING PROGRAM

## 2023-12-27 PROCEDURE — 6370000000 HC RX 637 (ALT 250 FOR IP): Performed by: STUDENT IN AN ORGANIZED HEALTH CARE EDUCATION/TRAINING PROGRAM

## 2023-12-27 PROCEDURE — 99232 SBSQ HOSP IP/OBS MODERATE 35: CPT | Performed by: NURSE PRACTITIONER

## 2023-12-27 PROCEDURE — 6360000002 HC RX W HCPCS: Performed by: STUDENT IN AN ORGANIZED HEALTH CARE EDUCATION/TRAINING PROGRAM

## 2023-12-27 RX ORDER — ATORVASTATIN CALCIUM 40 MG/1
40 TABLET, FILM COATED ORAL NIGHTLY
Qty: 30 TABLET | Refills: 0 | Status: SHIPPED | OUTPATIENT
Start: 2023-12-27

## 2023-12-27 RX ADMIN — MONTELUKAST 10 MG: 10 TABLET, FILM COATED ORAL at 09:01

## 2023-12-27 RX ADMIN — ENOXAPARIN SODIUM 40 MG: 100 INJECTION SUBCUTANEOUS at 09:01

## 2023-12-27 RX ADMIN — SODIUM CHLORIDE, PRESERVATIVE FREE 5 ML: 5 INJECTION INTRAVENOUS at 09:02

## 2023-12-27 RX ADMIN — FLUTICASONE PROPIONATE 1 SPRAY: 50 SPRAY, METERED NASAL at 09:01

## 2023-12-27 RX ADMIN — BUDESONIDE AND FORMOTEROL FUMARATE DIHYDRATE 2 PUFF: 160; 4.5 AEROSOL RESPIRATORY (INHALATION) at 07:50

## 2023-12-27 RX ADMIN — IPRATROPIUM BROMIDE AND ALBUTEROL SULFATE 1 DOSE: 2.5; .5 SOLUTION RESPIRATORY (INHALATION) at 12:19

## 2023-12-27 RX ADMIN — IPRATROPIUM BROMIDE AND ALBUTEROL SULFATE 1 DOSE: 2.5; .5 SOLUTION RESPIRATORY (INHALATION) at 07:39

## 2023-12-27 RX ADMIN — ASPIRIN 81 MG: 81 TABLET, CHEWABLE ORAL at 09:01

## 2023-12-27 NOTE — PROGRESS NOTES
in bed, hard of hearing  Eyes: no discharge  HENT: NCAT  Cardiovascular: RRR  Respiratory: Clear to auscultation   Gastrointestinal: Soft, non tender, nondistended  Genitourinary: no suprapubic tenderness  Musculoskeletal: No edema, nontender  Skin: warm, dry, no gross lesions  Neuro: Alert. No gross deficits  Psych: Mood appropriate.     Medications:   Medications:    sodium chloride flush  5-40 mL IntraVENous 2 times per day    fluticasone  1 spray Each Nostril Daily    ipratropium 0.5 mg-albuterol 2.5 mg  1 Dose Inhalation 4x Daily RT    montelukast  10 mg Oral Daily    aspirin  81 mg Oral Daily    sodium chloride flush  5-40 mL IntraVENous 2 times per day    enoxaparin  40 mg SubCUTAneous Daily    atorvastatin  40 mg Oral Nightly    tiotropium  2 puff Inhalation Daily RT    budesonide-formoterol  2 puff Inhalation BID      Infusions:    sodium chloride      sodium chloride       PRN Meds: sodium chloride flush, 5-40 mL, PRN  sodium chloride, , PRN  acetaminophen, 650 mg, Q4H PRN  ondansetron, 4 mg, Q8H PRN   Or  ondansetron, 4 mg, Q6H PRN  albuterol sulfate HFA, 2 puff, Q6H PRN  sodium chloride flush, 5-40 mL, PRN  sodium chloride, , PRN  potassium chloride, 40 mEq, PRN   Or  potassium alternative oral replacement, 40 mEq, PRN   Or  potassium chloride, 10 mEq, PRN  magnesium sulfate, 2,000 mg, PRN  ondansetron, 4 mg, Q8H PRN   Or  ondansetron, 4 mg, Q6H PRN  polyethylene glycol, 17 g, Daily PRN  acetaminophen, 650 mg, Q6H PRN   Or  acetaminophen, 650 mg, Q6H PRN  meclizine, 12.5 mg, TID PRN        Labs      No results for input(s): \"WBC\", \"HGB\", \"HCT\", \"PLT\" in the last 72 hours.     No results for input(s): \"NA\", \"K\", \"CL\", \"CO2\", \"PHOS\", \"BUN\", \"CREATININE\", \"CA\" in the last 72 hours.    No results for input(s): \"AST\", \"ALT\", \"ALB\", \"BILIDIR\", \"BILITOT\", \"ALKPHOS\" in the last 72 hours.  No results for input(s): \"INR\" in the last 72 hours.  No results for input(s): \"CKTOTAL\", \"CKMB\", \"CKMBINDEX\", \"TROPONINT\" in  the last 72 hours.   Lab Results   Component Value Date/Time    LABA1C 5.9 12/23/2023 04:57 PM        No results found for: \"MG\"        Electronically signed by Micah Mcnulty MD on 12/27/2023 at 12:55 PM

## 2023-12-27 NOTE — CARE COORDINATION
Pt from [facility] and is visiting daughter here in town. No local pcp to follow for hhc therefore referral cancelled. Notified Ayesha via phone. Pt also aware can not initiate hhc d/t [place].

## 2023-12-27 NOTE — PROGRESS NOTES
Outpatient Pharmacy Progress Note for Meds-to-Beds    Total number of Prescriptions Filled: 1  The following medications were dispensed to the patient during the discharge process:  atorvastatin    Additional Documentation:  Patient's family member picked-up the medication(s) in the OP Pharmacy      Thank you for letting us serve your patients.   [address]    [address] [place] [address], [address] [place] [address]    Phone: [phone]    Fax: [phone]

## 2023-12-27 NOTE — DISCHARGE SUMMARY
portion of the orbits demonstrate no acute abnormality. SINUSES:  Mild partial opacification of the ethmoid sinuses.  Trace right mastoid fluid. SOFT TISSUES/SKULL: No acute abnormality of the visualized skull or soft tissues. CTA NECK: AORTIC ARCH/ARCH VESSELS: No dissection or arterial injury.  Severe focal stenosis of the proximal left subclavian artery prior to the vertebral artery takeoff; evaluation is limited by motion degradation. CAROTID ARTERIES: No dissection, arterial injury, or hemodynamically significant stenosis by NASCET criteria. VERTEBRAL ARTERIES: No dissection, arterial injury, or significant stenosis. SOFT TISSUES: Mild emphysematous changes of the lungs with biapical scarring. No cervical or superior mediastinal lymphadenopathy.  The larynx and pharynx are unremarkable.  No acute abnormality of the salivary and thyroid glands. BONES: No acute osseous abnormality.  Bone mineralization appears osteopenic/osteoporotic.  Multilevel cervical spondylosis. CTA HEAD: ANTERIOR CIRCULATION: No significant stenosis of the intracranial internal carotid, anterior cerebral, or middle cerebral arteries. No aneurysm. POSTERIOR CIRCULATION: No significant stenosis of the vertebral, basilar, or posterior cerebral arteries.  Mild focal stenosis the left vertebral artery V4 segment.  No aneurysm. OTHER: No dural venous sinus thrombosis on this non-dedicated study.     CT Brain: 1. No acute intracranial abnormality. 2. Involutional parenchymal changes with suggestion of microvascular ischemic disease. 3. Chronic left basal ganglia encephalomalacia/gliosis. CTA Head/Neck: 1. Severe focal stenosis of the proximal left subclavian artery prior to the vertebral artery takeoff; evaluation is limited by motion degradation. Possibility or free-floating thrombus cannot be excluded.  Stenosis also raises the possibility of left-sided subclavian steal phenomenon/syndrome. 2. Otherwise, no evidence of large vessel occlusion or  visualized skull or soft tissues. CTA NECK: AORTIC ARCH/ARCH VESSELS: No dissection or arterial injury. Severe focal stenosis of the proximal left subclavian artery prior to the vertebral artery takeoff; evaluation is limited by motion degradation. CAROTID ARTERIES: No dissection, arterial injury, or hemodynamically significant stenosis by NASCET criteria. VERTEBRAL ARTERIES: No dissection, arterial injury, or significant stenosis. SOFT TISSUES: Mild emphysematous changes of the lungs with biapical scarring. No cervical or superior mediastinal lymphadenopathy. The larynx and pharynx are unremarkable. No acute abnormality of the salivary and thyroid glands. BONES: No acute osseous abnormality. Bone mineralization appears osteopenic/osteoporotic. Multilevel cervical spondylosis. CTA HEAD: ANTERIOR CIRCULATION: No significant stenosis of the intracranial internal carotid, anterior cerebral, or middle cerebral arteries. No aneurysm. POSTERIOR CIRCULATION: No significant stenosis of the vertebral, basilar, or posterior cerebral arteries. Mild focal stenosis the left vertebral artery V4 segment. No aneurysm. OTHER: No dural venous sinus thrombosis on this non-dedicated study. CT Brain: 1. No acute intracranial abnormality. 2. Involutional parenchymal changes with suggestion of microvascular ischemic disease. 3. Chronic left basal ganglia encephalomalacia/gliosis. CTA Head/Neck: 1. Severe focal stenosis of the proximal left subclavian artery prior to the vertebral artery takeoff; evaluation is limited by motion degradation. Possibility or free-floating thrombus cannot be excluded. Stenosis also raises the possibility of left-sided subclavian steal phenomenon/syndrome. 2. Otherwise, no evidence of large vessel occlusion or significant stenosis in the remaining major arteries of the head and neck.        Time Spent Discharging patient 32 minutes    Electronically signed by Bola Jiang MD on 12/27/2023 at 2:39

## 2023-12-27 NOTE — CARE COORDINATION
Pt cannot have [facility] d/t she lives in [place] per [address] [place] [address]. CM updated pt.   TE

## 2023-12-27 NOTE — CARE COORDINATION
.CM met with pt for d/c planning. Introduced self and updated white board. Pt lives with daughter and is independent with ADL's. Pt  has a PCP, has insurance, and is able to afford her medication. [facility] offered and pt is agreeable. [address] list provided. Pt does not have a [facility] preference. Informed her of the hospital affiliation with [address] [place] [address] and she is agreeable for referral to be made. Pt denies any other d/c needs at this time. D/c plan is home with daughter and HHC. [facility] [id number] [facility] PS and informed her that pt has a d/c order. She will send orders to office. Notify CM if any new d/c needs arise. TE       12/27/23 1500   Service Assessment   Patient Orientation Alert and Oriented   Cognition Alert   History Provided By Patient   Primary Caregiver [address] [place] [address]  (DAUGHTER)   Patient's Healthcare Decision Maker is:   (SELF)   PCP Verified by CM Yes   Prior Functional Level Independent in ADLs/IADLs   Current Functional Level Independent in ADLs/IADLs   Can patient return to prior living arrangement Yes   Ability to make needs known: Good   Family able to assist with home care needs: Yes   Financial Resources Medicare   Social/Functional History   Lives With Daughter   Type of Home Trailer   Home Layout One level   Bathroom Shower/Tub Tub/Shower unit   [employer]ste[employer]   Transfer Assistance Independent   Discharge Planning   Type of Residence Trailer/Mobile Home   Living Arrangements Children   Current Services Prior To Admission None   [address]   DME Ordered?  No   Type of Home Care Services OT;PT;Nursing Services   Patient expects to be discharged to: Trailer/mobile home   Services At/After Discharge   Transition of Care Consult (CM Consult) [facility] Discharge Home Health   Condition of Participation: Discharge Planning

## 2023-12-27 NOTE — PROGRESS NOTES
Vascular/Interventional Neurology Progress Note  [facility]  Patient Name: Luz Ordoñez     : 1940      Subjective:     The patient was seen and examined.  Chart reviewed.  No significant events documented overnight.  Right groin site soft without hematoma, pulses palpable.  Patient denies any dizziness or any new neurological changes today.  Reviewed post angiogram restrictions with the patient and her daughter on the telephone.  She denies further needs.      Objective:   Scheduled Meds:   sodium chloride flush  5-40 mL IntraVENous 2 times per day    fluticasone  1 spray Each Nostril Daily    ipratropium 0.5 mg-albuterol 2.5 mg  1 Dose Inhalation 4x Daily RT    montelukast  10 mg Oral Daily    aspirin  81 mg Oral Daily    sodium chloride flush  5-40 mL IntraVENous 2 times per day    enoxaparin  40 mg SubCUTAneous Daily    atorvastatin  40 mg Oral Nightly    tiotropium  2 puff Inhalation Daily RT    budesonide-formoterol  2 puff Inhalation BID     Continuous Infusions:   sodium chloride      sodium chloride       PRN Meds:.sodium chloride flush, sodium chloride, acetaminophen, ondansetron **OR** ondansetron, albuterol sulfate HFA, sodium chloride flush, sodium chloride, potassium chloride **OR** potassium alternative oral replacement **OR** potassium chloride, magnesium sulfate, ondansetron **OR** ondansetron, polyethylene glycol, acetaminophen **OR** acetaminophen, meclizine    Vital Signs:  [unfilled]     General: A&O x 4, NAD, cooperative  HEENT: NC/AT, EOMI, PERRL, mmm, neck supple  Extremities: Right groin site soft, mild ecchymosis.  No hematoma.  Pulses palpable    Neurological Exam:         Mental Status:  A&O to self, location, and year. NAD, speech clear, language fluent, repetition and naming intact, follows commands appropriately     Cranial Nerves:  CN II-XII intact     Sensation: intact LT/temp UE/LE's b/l     Motor: 5/5 UE/LE's b/l, tone and bulk normal, no pronator  Short, DO on 12/26/2023 at 3:04 PM     Assessment/Plan:      82-year-old female presenting with dizziness. Cerebral angiogram showed <50% stenosis left prevertebral subclavian artery and no angiographic evidence of subclavian steal syndrome.      -Neuroimaging as above  -12/26: Cerebral angiogram showed less than 50% stenosis of the left right vertebral subclavian artery. Not flow-limiting. No evidence of subclavian steal syndrome. Diffuse luminal irregularities in the intracranial circulation consistent with a intracranial atherosclerotic changes, no flow-limiting lesion identified. No intervention recommended. Do not believe this is the cause of the patient's dizziness or near syncope. -Continue aspirin and statin therapy    Pertinent images discussed/reviewed with Dr. Key Doll who has fully participated in the care of this patient and agrees with plan.       Electronically signed by GABI Caraballo CNP on 12/27/2023 at 9:03 AM   12/27/2023

## 2023-12-27 NOTE — PLAN OF CARE
Problem: Discharge Planning  Goal: Discharge to home or other facility with appropriate resources  12/27/2023 0017 by Samantha Bailey RN  Outcome: Progressing  12/27/2023 0016 by Samantha Bailey RN  Outcome: Progressing     Problem: Pain  Goal: Verbalizes/displays adequate comfort level or baseline comfort level  Outcome: Progressing     Problem: Safety - Adult  Goal: Free from fall injury  12/27/2023 0017 by Samantha Bailey RN  Outcome: Progressing  12/27/2023 0016 by Samantha Bailey RN  Outcome: Progressing

## 2023-12-28 ENCOUNTER — TELEPHONE (OUTPATIENT)
Dept: ONCOLOGY | Facility: CLINIC | Age: 83
End: 2023-12-28

## 2023-12-28 NOTE — TELEPHONE ENCOUNTER
Caller: Lilly Garcia    Relationship to patient: Self    Best call back number: [phone]     Chief complaint: PATIENT TO CANCEL 1/4/24 APPT AS SHE HAS RECENTLY BEEN TO HOSPITAL IN [place] AND MUST STAY NEARBY FOR FOLLOW UP FOR AT LEAST 1 WEEK.     PATIENT WILL CALL BACK TO RESCHEDULE AT A LATER TIME

## 2024-02-07 ENCOUNTER — OFFICE VISIT (OUTPATIENT)
Dept: PULMONOLOGY | Facility: CLINIC | Age: 84
End: 2024-02-07
Payer: MEDICARE

## 2024-02-07 VITALS
HEART RATE: 81 BPM | BODY MASS INDEX: 30.12 KG/M2 | TEMPERATURE: 97.7 F | HEIGHT: 62 IN | SYSTOLIC BLOOD PRESSURE: 158 MMHG | OXYGEN SATURATION: 96 % | DIASTOLIC BLOOD PRESSURE: 86 MMHG | WEIGHT: 163.7 LBS

## 2024-02-07 DIAGNOSIS — J44.9 CHRONIC OBSTRUCTIVE PULMONARY DISEASE, UNSPECIFIED COPD TYPE: Primary | ICD-10-CM

## 2024-02-07 DIAGNOSIS — Z72.0 TOBACCO USE: ICD-10-CM

## 2024-02-07 DIAGNOSIS — R91.8 MULTIPLE PULMONARY NODULES: ICD-10-CM

## 2024-02-07 RX ORDER — FLUTICASONE PROPIONATE AND SALMETEROL 250; 50 UG/1; UG/1
1 POWDER RESPIRATORY (INHALATION)
Qty: 1 EACH | Refills: 5 | Status: SHIPPED | OUTPATIENT
Start: 2024-02-07

## 2024-02-07 RX ORDER — ATORVASTATIN CALCIUM 10 MG/1
10 TABLET, FILM COATED ORAL EVERY EVENING
COMMUNITY
Start: 2023-11-27

## 2024-02-07 RX ORDER — MONTELUKAST SODIUM 10 MG/1
10 TABLET ORAL EVERY MORNING
Qty: 90 TABLET | Refills: 3 | Status: SHIPPED | OUTPATIENT
Start: 2024-02-07

## 2024-02-07 RX ORDER — MECLIZINE HYDROCHLORIDE 25 MG/1
25 TABLET ORAL 3 TIMES DAILY PRN
COMMUNITY
Start: 2023-12-23

## 2024-02-07 NOTE — PROGRESS NOTES
"[facility] Pulmonary Follow up      Chief Complaint  COPD (Follow up)    Subjective          Lilly Garcia presents to [facility] for routine follow-up on her COPD.  She says she is done very well over the last few months.  Has not had any recent acute exacerbation or illness.  She does have a bit of a chronic cough especially in the mornings.  She contributes that to some seasonal allergies and postnasal drainage.  She does continue on her Trelegy but unfortunately it is quite costly.  She has resumed her Singulair, she had been on that in the past but not recently she needs a refill today.  She feels like her shortness of breath with activity is at baseline.        Objective     Vital Signs:   /86 (BP Location: Left arm, Patient Position: Sitting, Cuff Size: Adult)   Pulse 81   Temp 97.7 °F (36.5 °C)   Ht 157.5 cm (62\")   Wt 74.3 kg (163 lb 11.2 oz)   SpO2 96% Comment: Room air at rest  BMI 29.94 kg/m²       Immunization History   Administered Date(s) Administered    COVID-19 (MODERNA) 1st,2nd,3rd Dose Monovalent 01/29/2021, 02/26/2021    Fluzone (or Fluarix & Flulaval for VFC) >6mos 10/10/2017    Pneumococcal Conjugate 20-Valent (PCV20) 11/07/2022    Pneumococcal Polysaccharide (PPSV23) 10/10/2017, 09/04/2019    RSV Vaccine, Unspecified 10/09/2023       Physical Exam  Vitals reviewed.   Constitutional:       Appearance: She is well-developed.   HENT:      Head: Normocephalic and atraumatic.   Eyes:      Pupils: Pupils are equal, round, and reactive to light.   Cardiovascular:      Rate and Rhythm: Normal rate and regular rhythm.      Heart sounds: No murmur heard.  Pulmonary:      Effort: Pulmonary effort is normal. No respiratory distress.      Breath sounds: Normal breath sounds. No wheezing or rales.   Abdominal:      General: Bowel sounds are normal. There is no distension.      Palpations: Abdomen is soft.   Musculoskeletal:         General: " Normal range of motion.      Cervical back: Normal range of motion and neck supple.   Skin:     General: Skin is warm and dry.      Findings: No erythema.   Neurological:      Mental Status: She is alert and oriented to person, place, and time.   Psychiatric:         Behavior: Behavior normal.          Result Review :                           Assessment and Plan    Problem List Items Addressed This Visit          Pulmonary and Pneumonias    COPD (chronic obstructive pulmonary disease) - Primary    Relevant Medications    montelukast (SINGULAIR) 10 MG tablet    Fluticasone-Salmeterol (ADVAIR/WIXELA) 250-50 MCG/ACT DISKUS    Other Relevant Orders    Spirometry with Diffusion Capacity & Lung Volumes (Completed)    Multiple pulmonary nodules    Overview     Stable subcent nodules, largest 4mm         Relevant Medications    montelukast (SINGULAIR) 10 MG tablet    Fluticasone-Salmeterol (ADVAIR/WIXELA) 250-50 MCG/ACT DISKUS       Tobacco    Tobacco use     Ms. Garcia is here today for her routine follow-up on her COPD.  She does have bit of an allergic component with seasonal allergies and postnasal drainage.  Continue on her Singulair.  We also discussed adding on some Flonase nasal spray in the evenings to help with her drainage and morning cough.    Currently she is on Trelegy but unfortunately it is quite costly.  She has used Advair in the past with similar results and would like to see if that would be more cost effective.    She will be due her follow up CT in May 2024 for her pulmonary nodules.     We did discuss the importance of smoking cessation.  She is trying to quit and had used the 21 mcg patches but it caused her not to sleep well.  She started only about 5 or 6 cigarettes a day so I told her she could at least go down to the 14 mcg patches.    Follow Up     Return in about 6 months (around 8/7/2024).  Patient was given instructions and counseling regarding her condition or for health maintenance advice.  Please see specific information pulled into the AVS if appropriate.     I spent 35 minutes caring for Lilly on this date of service. This time includes time spent by me in the following activities:preparing for the visit, reviewing tests, obtaining and/or reviewing a separately obtained history, performing a medically appropriate examination and/or evaluation , counseling and educating the patient/family/caregiver, ordering medications, tests, or procedures, referring and communicating with other health care professionals , documenting information in the medical record, and independently interpreting results and communicating that information with the patient/family/caregiver    Excluding time spent on other separate services such as performing procedures or test interpretation, if applicable    Moderate level of Medical Decision Making complexity based on 2 or more chronic stable illnesses and prescription drug management.    TOMY Juarez, ACNP  [facility]  Electronically signed

## 2024-03-20 ENCOUNTER — OFFICE VISIT (OUTPATIENT)
Dept: ORTHOPEDIC SURGERY | Facility: CLINIC | Age: 84
End: 2024-03-20
Payer: MEDICARE

## 2024-03-20 VITALS
SYSTOLIC BLOOD PRESSURE: 122 MMHG | HEIGHT: 62 IN | WEIGHT: 159.6 LBS | BODY MASS INDEX: 29.37 KG/M2 | DIASTOLIC BLOOD PRESSURE: 66 MMHG

## 2024-03-20 DIAGNOSIS — M17.0 PRIMARY OSTEOARTHRITIS OF KNEES, BILATERAL: Primary | ICD-10-CM

## 2024-03-20 RX ORDER — ROPIVACAINE HYDROCHLORIDE 5 MG/ML
4 INJECTION, SOLUTION EPIDURAL; INFILTRATION; PERINEURAL
Status: COMPLETED | OUTPATIENT
Start: 2024-03-20 | End: 2024-03-20

## 2024-03-20 RX ORDER — TRIAMCINOLONE ACETONIDE 40 MG/ML
40 INJECTION, SUSPENSION INTRA-ARTICULAR; INTRAMUSCULAR
Status: COMPLETED | OUTPATIENT
Start: 2024-03-20 | End: 2024-03-20

## 2024-03-20 RX ORDER — ATORVASTATIN CALCIUM 40 MG/1
40 TABLET, FILM COATED ORAL
COMMUNITY
Start: 2024-03-08

## 2024-03-20 RX ADMIN — ROPIVACAINE HYDROCHLORIDE 4 ML: 5 INJECTION, SOLUTION EPIDURAL; INFILTRATION; PERINEURAL at 12:05

## 2024-03-20 RX ADMIN — TRIAMCINOLONE ACETONIDE 40 MG: 40 INJECTION, SUSPENSION INTRA-ARTICULAR; INTRAMUSCULAR at 12:05

## 2024-03-20 NOTE — PROGRESS NOTES
[facility] Surgery Clinic Note    Subjective     Chief Complaint   Patient presents with    Follow-up     4 month follow up- osteoarthritis of knees, bilateral        HPI    It has been 4  month(s) since Ms. Garcia's last visit. She returns to clinic today for follow-up of bilateral knee arthritis. The issue has been ongoing for 3 year(s). She rates her pain a 5/10 on the pain scale. Previous/current treatments: NSAIDS and steroid injection (last injection 11/20/23). Current symptoms: pain, swelling, popping, and giving way/buckling. The pain is worse with walking, standing, climbing stairs, and working; resting and sitting improve the pain. Overall, she is doing the same.  She continues to work on smoking cessation.  Of note, she has tried viscosupplementation injections in the past without benefit.    I have reviewed the following portions of the patient's history and agree with: History of Present Illness and Review of Systems    Patient Active Problem List   Diagnosis    Smoker    Peripheral arterial disease    COPD (chronic obstructive pulmonary disease)    Tobacco use    Environmental and seasonal allergies    Multiple pulmonary nodules     Past Medical History:   Diagnosis Date    Bladder infection     Rheumatoid arteritis       Past Surgical History:   Procedure Laterality Date    CHOLECYSTECTOMY  1989    HYSTERECTOMY  1976    TONSILLECTOMY  1964      Family History   Problem Relation Age of Onset    Hypertension Mother     Heart disease Mother     Diabetes Father     Heart disease Father     Cancer Sister     Cancer Brother     Hypertension Brother      Social History     Socioeconomic History    Marital status:    Tobacco Use    Smoking status: Every Day     Current packs/day: 1.00     Average packs/day: 1 pack/day for 38.0 years (38.0 ttl pk-yrs)     Types: Cigarettes     Passive exposure: Current    Smokeless tobacco: Never    Tobacco comments:     Down to 6-10 a day as of 2/7/24    Vaping Use    Vaping status: Never Used   Substance and Sexual Activity    Alcohol use: Never    Drug use: Never    Sexual activity: Defer      Current Outpatient Medications on File Prior to Visit   Medication Sig Dispense Refill    acetaminophen (TYLENOL) 500 MG tablet Take 1 tablet by mouth Daily As Needed for Mild Pain.      albuterol (PROVENTIL) (2.5 MG/3ML) 0.083% nebulizer solution USE 1 VIAL IN NEBULIZER 4 TIMES DAILY 120 each 11    albuterol sulfate  (90 Base) MCG/ACT inhaler Inhale 2 puffs Every 4 (Four) Hours As Needed for Wheezing. 18 g 5    aspirin 81 MG EC tablet Take 1 tablet by mouth Daily.      Fluticasone-Salmeterol (ADVAIR/WIXELA) 250-50 MCG/ACT DISKUS Inhale 1 puff 2 (Two) Times a Day. 1 each 5    furosemide (LASIX) 20 MG tablet Take 1 tablet by mouth Daily As Needed.      ibuprofen (ADVIL,MOTRIN) 200 MG tablet Take 1 tablet by mouth Daily As Needed for Mild Pain.      meclizine (ANTIVERT) 25 MG tablet Take 1 tablet by mouth 3 (Three) Times a Day As Needed for Dizziness.      montelukast (SINGULAIR) 10 MG tablet Take 1 tablet by mouth Every Morning. 90 tablet 3    multivitamin (MULTIPLE VITAMINS ESSENTIAL PO) Take 1 tablet by mouth Daily.      solifenacin (VESIcare) 10 MG tablet Take 1 tablet by mouth Daily. 30 tablet 0    vitamin C (ASCORBIC ACID) 500 MG tablet Take 1 tablet by mouth Daily. 90 tablet 3    atorvastatin (LIPITOR) 40 MG tablet 1 tablet. (Patient not taking: Reported on 3/20/2024)      [DISCONTINUED] atorvastatin (LIPITOR) 10 MG tablet Take 1 tablet by mouth Every Evening.       No current facility-administered medications on file prior to visit.      No Known Allergies     Review of Systems   Constitutional:  Negative for activity change, appetite change, chills, diaphoresis, fatigue, fever and unexpected weight change.   HENT:  Negative for congestion, dental problem, drooling, ear discharge, ear pain, facial swelling, hearing loss, mouth sores, nosebleeds, postnasal  "drip, rhinorrhea, sinus pressure, sneezing, sore throat, tinnitus, trouble swallowing and voice change.    Eyes:  Negative for photophobia, pain, discharge, redness, itching and visual disturbance.   Respiratory:  Negative for apnea, cough, choking, chest tightness, shortness of breath, wheezing and stridor.    Cardiovascular:  Negative for chest pain, palpitations and leg swelling.   Gastrointestinal:  Negative for abdominal distention, abdominal pain, anal bleeding, blood in stool, constipation, diarrhea, nausea, rectal pain and vomiting.   Endocrine: Negative for cold intolerance, heat intolerance, polydipsia, polyphagia and polyuria.   Genitourinary:  Negative for decreased urine volume, difficulty urinating, dysuria, enuresis, flank pain, frequency, genital sores, hematuria and urgency.   Musculoskeletal:  Positive for arthralgias. Negative for back pain, gait problem, joint swelling, myalgias, neck pain and neck stiffness.   Skin:  Negative for color change, pallor, rash and wound.   Allergic/Immunologic: Negative for environmental allergies, food allergies and immunocompromised state.   Neurological:  Negative for dizziness, tremors, seizures, syncope, facial asymmetry, speech difficulty, weakness, light-headedness, numbness and headaches.   Hematological:  Negative for adenopathy. Does not bruise/bleed easily.   Psychiatric/Behavioral:  Negative for agitation, behavioral problems, confusion, decreased concentration, dysphoric mood, hallucinations, self-injury, sleep disturbance and suicidal ideas. The patient is not nervous/anxious and is not hyperactive.         Objective      Physical Exam  /66   Ht 157.5 cm (62.01\")   Wt 72.4 kg (159 lb 9.6 oz)   BMI 29.18 kg/m²     Body mass index is 29.18 kg/m².    General:   Mental Status:  Alert   Appearance: Cooperative, in no acute distress   Build and Nutrition: Well-nourished well-developed female   Orientation: Alert and oriented to person, place and " time   Posture: Normal   Gait: Unsteady    Integument:              Right knee: no skin lesions, no rash, no ecchymosis              Left knee: no skin lesions, no rash, no ecchymosis     Lower Extremities:              Right Knee:                          Tenderness:    Medial/lateral joint line tenderness                          Effusion:          1+                          Swelling:          None                          Crepitus:          Positive                          Atrophy:           None                          Range of motion:        Extension:       5°                                                              Flexion:           120°  Instability:        No varus laxity, no valgus laxity, negative anterior drawer  Deformities:     Varus              Left Knee:                          Tenderness:    Medial/lateral joint line tenderness                          Effusion:          None                          Swelling:          None                          Crepitus:          Positive                          Atrophy:           None                          Range of motion:        Extension:       5°                                                              Flexion:           120°  Instability:        No varus laxity, no valgus laxity, negative anterior drawer  Deformities:     Varus    Imaging/Studies  Imaging Results (Last 24 Hours)       Procedure Component Value Units Date/Time    XR Knee 4+ View Bilateral [[id number]] Resulted: 03/20/24 1149     Updated: 03/20/24 1150    Narrative:      Right Knee Radiographs  Indication: right knee pain  Views: Standing AP's and skiers of both knees, with lateral and sunrise   views of the right knee    Comparison: no prior studies available    Findings:    Bone-on-bone contact medial compartment, tricompartmental degeneration, no   acute bony abnormalities.  Varus alignment.  No unusual bony features.    Knee arthritis.    Left Knee  Radiographs  Indication: left knee pain  Views: Standing AP's and skiers of both knees, with lateral and sunrise   views of the left knee    Comparison: no prior studies available    Findings:   Near bone-on-bone contact medial compartment, tricompartmental   degeneration, no acute bony abnormalities.  Mild varus alignment.  No   unusual bony features.  Knee arthritis.              Assessment and Plan     Diagnoses and all orders for this visit:    1. Primary osteoarthritis of knees, bilateral (Primary)  -     XR Knee 4+ View Bilateral  -     - Large Joint Arthrocentesis: bilateral knee        1. Primary osteoarthritis of knees, bilateral        I reviewed my findings with the patient.  She would like injections for both her knees today, but is interested in knee replacement surgery.  We have discussed smoking cessation is required before proceeding, given her age and other medical issues which would be contributing factors affecting surgical outcome.  She understands, and desires to proceed with injections today and follow-up in 3 months.  I will see her back sooner for any problems.    Procedure Note:  The potential benefits of performing a therapeutic bilateral knee joint injections, as well as potential risks (including, but not limited to infection, swelling, pain, bleeding, bruising, nerve/blood vessel damage, skin color changes, transient elevation in blood glucose levels, and fat atrophy) were discussed with the patient.  After informed consent, timeout procedure was performed, and the skin on the right and left knees was prepped with chlorhexidine soap and alcohol, after which ethyl chloride was applied to the skin at the injection site. Via the anterolateral approach, 1ml of Kenalog 40mg/ml mixed with 4ml 0.5% ropivacaine plain was injected into the knee joints.  The patient tolerated the procedures well, experiencing 9% improvement in the right knee and 9% improvement in the left knee a few minutes  following the injections. There were no complications.  Band-Aids were applied to the injection sites. Post-procedural instructions were given to the patient and/or their caregiver.      Return in about 3 months (around 6/20/2024).      Clayton Brannon MD  03/20/24  12:58 EDT    Dictated Utilizing Dragon Dictation

## 2024-03-20 NOTE — PROGRESS NOTES
Procedure   - Large Joint Arthrocentesis: bilateral knee on 3/20/2024 12:05 PM  Indications: pain  Details: 21 G needle, anterolateral approach  Medications (Right): 4 mL ropivacaine 0.5 %; 40 mg triamcinolone acetonide 40 MG/ML  Medications (Left): 4 mL ropivacaine 0.5 %; 40 mg triamcinolone acetonide 40 MG/ML  Outcome: tolerated well, no immediate complications  Procedure, treatment alternatives, risks and benefits explained, specific risks discussed. Consent was given by the patient. Immediately prior to procedure a time out was called to verify the correct patient, procedure, equipment, support staff and site/side marked as required. Patient was prepped and draped in the usual sterile fashion.

## 2024-03-21 DIAGNOSIS — M25.562 PAIN IN BOTH KNEES, UNSPECIFIED CHRONICITY: Primary | ICD-10-CM

## 2024-03-21 DIAGNOSIS — M25.561 PAIN IN BOTH KNEES, UNSPECIFIED CHRONICITY: Primary | ICD-10-CM

## 2024-04-05 ENCOUNTER — TELEPHONE (OUTPATIENT)
Dept: PULMONOLOGY | Facility: CLINIC | Age: 84
End: 2024-04-05
Payer: MEDICARE

## 2024-04-05 RX ORDER — FLUTICASONE FUROATE, UMECLIDINIUM BROMIDE AND VILANTEROL TRIFENATATE 100; 62.5; 25 UG/1; UG/1; UG/1
1 POWDER RESPIRATORY (INHALATION)
Qty: 2 EACH | Refills: 0 | COMMUNITY
Start: 2024-04-05

## 2024-07-02 RX ORDER — FLUTICASONE FUROATE, UMECLIDINIUM BROMIDE AND VILANTEROL TRIFENATATE 100; 62.5; 25 UG/1; UG/1; UG/1
1 POWDER RESPIRATORY (INHALATION) DAILY
Qty: 60 EACH | Refills: 3 | Status: SHIPPED | OUTPATIENT
Start: 2024-07-02

## 2024-08-28 ENCOUNTER — HOSPITAL ENCOUNTER (INPATIENT)
Age: 84
LOS: 8 days | Discharge: HOSPICE | End: 2024-09-06
Attending: EMERGENCY MEDICINE | Admitting: STUDENT IN AN ORGANIZED HEALTH CARE EDUCATION/TRAINING PROGRAM
Payer: MEDICARE

## 2024-08-28 ENCOUNTER — APPOINTMENT (OUTPATIENT)
Dept: GENERAL RADIOLOGY | Age: 84
End: 2024-08-28
Payer: MEDICARE

## 2024-08-28 DIAGNOSIS — R06.89 DYSPNEA AND RESPIRATORY ABNORMALITIES: ICD-10-CM

## 2024-08-28 DIAGNOSIS — R06.00 DYSPNEA AND RESPIRATORY ABNORMALITIES: ICD-10-CM

## 2024-08-28 DIAGNOSIS — J90 PLEURAL EFFUSION: Primary | ICD-10-CM

## 2024-08-28 LAB — SARS-COV-2 RDRP RESP QL NAA+PROBE: NOT DETECTED

## 2024-08-28 PROCEDURE — 87635 SARS-COV-2 COVID-19 AMP PRB: CPT

## 2024-08-28 PROCEDURE — 71045 X-RAY EXAM CHEST 1 VIEW: CPT

## 2024-08-28 PROCEDURE — 80053 COMPREHEN METABOLIC PANEL: CPT

## 2024-08-28 PROCEDURE — 85025 COMPLETE CBC W/AUTO DIFF WBC: CPT

## 2024-08-28 PROCEDURE — 6370000000 HC RX 637 (ALT 250 FOR IP): Performed by: EMERGENCY MEDICINE

## 2024-08-28 PROCEDURE — 84484 ASSAY OF TROPONIN QUANT: CPT

## 2024-08-28 PROCEDURE — 83880 ASSAY OF NATRIURETIC PEPTIDE: CPT

## 2024-08-28 PROCEDURE — 99285 EMERGENCY DEPT VISIT HI MDM: CPT

## 2024-08-28 PROCEDURE — 93005 ELECTROCARDIOGRAM TRACING: CPT | Performed by: EMERGENCY MEDICINE

## 2024-08-28 RX ORDER — TRAMADOL HYDROCHLORIDE 50 MG/1
50 TABLET ORAL EVERY 6 HOURS PRN
Status: ON HOLD | COMMUNITY
End: 2024-09-06 | Stop reason: HOSPADM

## 2024-08-28 RX ORDER — ACETAMINOPHEN 500 MG
500 TABLET ORAL EVERY 6 HOURS PRN
Status: ON HOLD | COMMUNITY
End: 2024-09-06 | Stop reason: HOSPADM

## 2024-08-28 RX ORDER — LIDOCAINE 4 G/G
1 PATCH TOPICAL DAILY
Status: ON HOLD | COMMUNITY
End: 2024-09-06 | Stop reason: HOSPADM

## 2024-08-28 RX ORDER — SENNA AND DOCUSATE SODIUM 50; 8.6 MG/1; MG/1
1 TABLET, FILM COATED ORAL DAILY
Status: ON HOLD | COMMUNITY
End: 2024-09-06 | Stop reason: HOSPADM

## 2024-08-28 RX ORDER — MECLIZINE HCL 25MG 25 MG/1
25 TABLET, CHEWABLE ORAL 3 TIMES DAILY PRN
Status: ON HOLD | COMMUNITY
End: 2024-09-06 | Stop reason: HOSPADM

## 2024-08-28 RX ORDER — ONDANSETRON 4 MG/1
8 TABLET, FILM COATED ORAL EVERY 8 HOURS PRN
Status: ON HOLD | COMMUNITY
End: 2024-09-06 | Stop reason: HOSPADM

## 2024-08-28 RX ORDER — IPRATROPIUM BROMIDE AND ALBUTEROL SULFATE 2.5; .5 MG/3ML; MG/3ML
1 SOLUTION RESPIRATORY (INHALATION) ONCE
Status: COMPLETED | OUTPATIENT
Start: 2024-08-28 | End: 2024-08-28

## 2024-08-28 RX ORDER — AMIODARONE HYDROCHLORIDE 200 MG/1
200 TABLET ORAL DAILY
Status: ON HOLD | COMMUNITY
End: 2024-09-06 | Stop reason: HOSPADM

## 2024-08-28 RX ORDER — PREDNISONE 20 MG/1
60 TABLET ORAL ONCE
Status: COMPLETED | OUTPATIENT
Start: 2024-08-28 | End: 2024-08-28

## 2024-08-28 RX ORDER — LACTULOSE 10 G/15ML
20 SOLUTION ORAL 3 TIMES DAILY
Status: ON HOLD | COMMUNITY
End: 2024-09-06 | Stop reason: HOSPADM

## 2024-08-28 RX ORDER — HYDROCODONE BITARTRATE AND ACETAMINOPHEN 5; 325 MG/1; MG/1
1 TABLET ORAL EVERY 6 HOURS PRN
Status: ON HOLD | COMMUNITY
End: 2024-09-06 | Stop reason: HOSPADM

## 2024-08-28 RX ADMIN — IPRATROPIUM BROMIDE AND ALBUTEROL SULFATE 1 DOSE: .5; 2.5 SOLUTION RESPIRATORY (INHALATION) at 23:00

## 2024-08-28 RX ADMIN — PREDNISONE 60 MG: 20 TABLET ORAL at 22:59

## 2024-08-28 ASSESSMENT — PAIN - FUNCTIONAL ASSESSMENT: PAIN_FUNCTIONAL_ASSESSMENT: NONE - DENIES PAIN

## 2024-08-28 ASSESSMENT — LIFESTYLE VARIABLES
HOW MANY STANDARD DRINKS CONTAINING ALCOHOL DO YOU HAVE ON A TYPICAL DAY: PATIENT DOES NOT DRINK
HOW OFTEN DO YOU HAVE A DRINK CONTAINING ALCOHOL: NEVER

## 2024-08-29 ENCOUNTER — APPOINTMENT (OUTPATIENT)
Dept: GENERAL RADIOLOGY | Age: 84
End: 2024-08-29
Payer: MEDICARE

## 2024-08-29 ENCOUNTER — APPOINTMENT (OUTPATIENT)
Dept: INTERVENTIONAL RADIOLOGY/VASCULAR | Age: 84
End: 2024-08-29
Payer: MEDICARE

## 2024-08-29 PROBLEM — J90 PLEURAL EFFUSION: Status: ACTIVE | Noted: 2024-08-29

## 2024-08-29 PROBLEM — J96.20 ACUTE ON CHRONIC RESPIRATORY FAILURE (HCC): Status: ACTIVE | Noted: 2024-08-29

## 2024-08-29 LAB
ALBUMIN SERPL-MCNC: 3.7 GM/DL (ref 3.4–5)
ALBUMIN SERPL-MCNC: 4 GM/DL (ref 3.4–5)
ALP BLD-CCNC: 305 IU/L (ref 40–129)
ALP BLD-CCNC: 336 IU/L (ref 40–128)
ALT SERPL-CCNC: 19 U/L (ref 10–40)
ALT SERPL-CCNC: 24 U/L (ref 10–40)
ANION GAP SERPL CALCULATED.3IONS-SCNC: 10 MMOL/L (ref 7–16)
ANION GAP SERPL CALCULATED.3IONS-SCNC: 14 MMOL/L (ref 7–16)
ANISOCYTOSIS: ABNORMAL
AST SERPL-CCNC: 20 IU/L (ref 15–37)
AST SERPL-CCNC: 27 IU/L (ref 15–37)
BANDED NEUTROPHILS ABSOLUTE COUNT: 0.76 K/CU MM
BANDED NEUTROPHILS RELATIVE PERCENT: 10 % (ref 5–11)
BASOPHILS ABSOLUTE: 0.1 K/CU MM
BASOPHILS RELATIVE PERCENT: 1 % (ref 0–1)
BILIRUB SERPL-MCNC: 0.5 MG/DL (ref 0–1)
BILIRUB SERPL-MCNC: 0.5 MG/DL (ref 0–1)
BUN SERPL-MCNC: 22 MG/DL (ref 6–23)
BUN SERPL-MCNC: 24 MG/DL (ref 6–23)
CALCIUM SERPL-MCNC: 8.8 MG/DL (ref 8.3–10.6)
CALCIUM SERPL-MCNC: 9.2 MG/DL (ref 8.3–10.6)
CHLORIDE BLD-SCNC: 99 MMOL/L (ref 99–110)
CHLORIDE BLD-SCNC: 99 MMOL/L (ref 99–110)
CO2: 30 MMOL/L (ref 21–32)
CO2: 31 MMOL/L (ref 21–32)
CREAT SERPL-MCNC: 1 MG/DL (ref 0.6–1.1)
CREAT SERPL-MCNC: 1.3 MG/DL (ref 0.6–1.1)
DIFFERENTIAL TYPE: ABNORMAL
DIFFERENTIAL TYPE: ABNORMAL
EKG ATRIAL RATE: 113 BPM
EKG DIAGNOSIS: NORMAL
EKG P AXIS: 102 DEGREES
EKG P-R INTERVAL: 122 MS
EKG Q-T INTERVAL: 358 MS
EKG QRS DURATION: 108 MS
EKG QTC CALCULATION (BAZETT): 491 MS
EKG R AXIS: -83 DEGREES
EKG T AXIS: 7 DEGREES
EKG VENTRICULAR RATE: 113 BPM
EOSINOPHILS ABSOLUTE: 0.1 K/CU MM
EOSINOPHILS RELATIVE PERCENT: 1 % (ref 0–3)
GFR, ESTIMATED: 41 ML/MIN/1.73M2
GFR, ESTIMATED: 56 ML/MIN/1.73M2
GLUCOSE BLD-MCNC: 114 MG/DL (ref 70–99)
GLUCOSE SERPL-MCNC: 145 MG/DL (ref 70–99)
GLUCOSE SERPL-MCNC: 199 MG/DL (ref 70–99)
HCT VFR BLD CALC: 42.1 % (ref 37–47)
HCT VFR BLD CALC: 43.9 % (ref 37–47)
HEMOGLOBIN: 13.5 GM/DL (ref 12.5–16)
HEMOGLOBIN: 14 GM/DL (ref 12.5–16)
LYMPHOCYTES ABSOLUTE: 0.5 K/CU MM
LYMPHOCYTES ABSOLUTE: 2.2 K/CU MM
LYMPHOCYTES RELATIVE PERCENT: 18 % (ref 24–44)
LYMPHOCYTES RELATIVE PERCENT: 7 % (ref 24–44)
MCH RBC QN AUTO: 31.2 PG (ref 27–31)
MCH RBC QN AUTO: 31.4 PG (ref 27–31)
MCHC RBC AUTO-ENTMCNC: 31.9 % (ref 32–36)
MCHC RBC AUTO-ENTMCNC: 32.1 % (ref 32–36)
MCV RBC AUTO: 97.8 FL (ref 78–100)
MCV RBC AUTO: 97.9 FL (ref 78–100)
METAMYELOCYTES ABSOLUTE COUNT: 0.15 K/CU MM
METAMYELOCYTES PERCENT: 2 %
MONOCYTES ABSOLUTE: 0.6 K/CU MM
MONOCYTES ABSOLUTE: 2.6 K/CU MM
MONOCYTES RELATIVE PERCENT: 21 % (ref 0–4)
MONOCYTES RELATIVE PERCENT: 8 % (ref 0–4)
NEUTROPHILS ABSOLUTE: 5.6 K/CU MM
NEUTROPHILS ABSOLUTE: 7.4 K/CU MM
NEUTROPHILS RELATIVE PERCENT: 59 % (ref 36–66)
NEUTROPHILS RELATIVE PERCENT: 73 % (ref 36–66)
PDW BLD-RTO: 14.5 % (ref 11.7–14.9)
PDW BLD-RTO: 15 % (ref 11.7–14.9)
PLATELET # BLD: 83 K/CU MM (ref 140–440)
PLATELET # BLD: 86 K/CU MM (ref 140–440)
PLT MORPHOLOGY: ABNORMAL
PLT MORPHOLOGY: ABNORMAL
PMV BLD AUTO: 9.7 FL (ref 7.5–11.1)
PMV BLD AUTO: 9.9 FL (ref 7.5–11.1)
POTASSIUM SERPL-SCNC: 4.9 MMOL/L (ref 3.5–5.1)
POTASSIUM SERPL-SCNC: 4.9 MMOL/L (ref 3.5–5.1)
PRO-BNP: 804.4 PG/ML
RBC # BLD: 4.3 M/CU MM (ref 4.2–5.4)
RBC # BLD: 4.49 M/CU MM (ref 4.2–5.4)
RBC # BLD: ABNORMAL 10*6/UL
SODIUM BLD-SCNC: 139 MMOL/L (ref 135–145)
SODIUM BLD-SCNC: 144 MMOL/L (ref 135–145)
TOTAL PROTEIN: 6.4 GM/DL (ref 6.4–8.2)
TOTAL PROTEIN: 6.4 GM/DL (ref 6.4–8.2)
TROPONIN, HIGH SENSITIVITY: 26 NG/L (ref 0–14)
TROPONIN, HIGH SENSITIVITY: 26 NG/L (ref 0–14)
WBC # BLD: 12.4 K/CU MM (ref 4–10.5)
WBC # BLD: 7.6 K/CU MM (ref 4–10.5)

## 2024-08-29 PROCEDURE — 6370000000 HC RX 637 (ALT 250 FOR IP): Performed by: EMERGENCY MEDICINE

## 2024-08-29 PROCEDURE — 6370000000 HC RX 637 (ALT 250 FOR IP): Performed by: STUDENT IN AN ORGANIZED HEALTH CARE EDUCATION/TRAINING PROGRAM

## 2024-08-29 PROCEDURE — 93010 ELECTROCARDIOGRAM REPORT: CPT | Performed by: INTERNAL MEDICINE

## 2024-08-29 PROCEDURE — 85027 COMPLETE CBC AUTOMATED: CPT

## 2024-08-29 PROCEDURE — 82962 GLUCOSE BLOOD TEST: CPT

## 2024-08-29 PROCEDURE — 87070 CULTURE OTHR SPECIMN AEROBIC: CPT

## 2024-08-29 PROCEDURE — 88108 CYTOPATH CONCENTRATE TECH: CPT | Performed by: PATHOLOGY

## 2024-08-29 PROCEDURE — 83615 LACTATE (LD) (LDH) ENZYME: CPT

## 2024-08-29 PROCEDURE — 2580000003 HC RX 258: Performed by: STUDENT IN AN ORGANIZED HEALTH CARE EDUCATION/TRAINING PROGRAM

## 2024-08-29 PROCEDURE — 6360000002 HC RX W HCPCS: Performed by: STUDENT IN AN ORGANIZED HEALTH CARE EDUCATION/TRAINING PROGRAM

## 2024-08-29 PROCEDURE — 88342 IMHCHEM/IMCYTCHM 1ST ANTB: CPT | Performed by: PATHOLOGY

## 2024-08-29 PROCEDURE — 84484 ASSAY OF TROPONIN QUANT: CPT

## 2024-08-29 PROCEDURE — 32555 ASPIRATE PLEURA W/ IMAGING: CPT

## 2024-08-29 PROCEDURE — 71045 X-RAY EXAM CHEST 1 VIEW: CPT

## 2024-08-29 PROCEDURE — 0W993ZZ DRAINAGE OF RIGHT PLEURAL CAVITY, PERCUTANEOUS APPROACH: ICD-10-PCS | Performed by: RADIOLOGY

## 2024-08-29 PROCEDURE — 88305 TISSUE EXAM BY PATHOLOGIST: CPT | Performed by: PATHOLOGY

## 2024-08-29 PROCEDURE — 2700000000 HC OXYGEN THERAPY PER DAY

## 2024-08-29 PROCEDURE — 2709999900 IR GUIDED THORACENTESIS PLEURAL

## 2024-08-29 PROCEDURE — 80053 COMPREHEN METABOLIC PANEL: CPT

## 2024-08-29 PROCEDURE — 94761 N-INVAS EAR/PLS OXIMETRY MLT: CPT

## 2024-08-29 PROCEDURE — 81003 URINALYSIS AUTO W/O SCOPE: CPT

## 2024-08-29 PROCEDURE — 94640 AIRWAY INHALATION TREATMENT: CPT

## 2024-08-29 PROCEDURE — 88341 IMHCHEM/IMCYTCHM EA ADD ANTB: CPT | Performed by: PATHOLOGY

## 2024-08-29 PROCEDURE — 99222 1ST HOSP IP/OBS MODERATE 55: CPT | Performed by: INTERNAL MEDICINE

## 2024-08-29 PROCEDURE — 36415 COLL VENOUS BLD VENIPUNCTURE: CPT

## 2024-08-29 PROCEDURE — 51798 US URINE CAPACITY MEASURE: CPT

## 2024-08-29 PROCEDURE — 32555 ASPIRATE PLEURA W/ IMAGING: CPT | Performed by: RADIOLOGY

## 2024-08-29 PROCEDURE — 84300 ASSAY OF URINE SODIUM: CPT

## 2024-08-29 PROCEDURE — APPNB180 APP NON BILLABLE TIME > 60 MINS: Performed by: PHYSICIAN ASSISTANT

## 2024-08-29 PROCEDURE — 85007 BL SMEAR W/DIFF WBC COUNT: CPT

## 2024-08-29 PROCEDURE — 82570 ASSAY OF URINE CREATININE: CPT

## 2024-08-29 PROCEDURE — 82945 GLUCOSE OTHER FLUID: CPT

## 2024-08-29 PROCEDURE — 2140000000 HC CCU INTERMEDIATE R&B

## 2024-08-29 PROCEDURE — 94664 DEMO&/EVAL PT USE INHALER: CPT

## 2024-08-29 PROCEDURE — 94010 BREATHING CAPACITY TEST: CPT

## 2024-08-29 PROCEDURE — 2500000003 HC RX 250 WO HCPCS: Performed by: RADIOLOGY

## 2024-08-29 PROCEDURE — 87205 SMEAR GRAM STAIN: CPT

## 2024-08-29 PROCEDURE — 89051 BODY FLUID CELL COUNT: CPT

## 2024-08-29 PROCEDURE — 5A0945A ASSISTANCE WITH RESPIRATORY VENTILATION, 24-96 CONSECUTIVE HOURS, HIGH NASAL FLOW/VELOCITY: ICD-10-PCS | Performed by: STUDENT IN AN ORGANIZED HEALTH CARE EDUCATION/TRAINING PROGRAM

## 2024-08-29 PROCEDURE — 84157 ASSAY OF PROTEIN OTHER: CPT

## 2024-08-29 RX ORDER — ONDANSETRON 2 MG/ML
4 INJECTION INTRAMUSCULAR; INTRAVENOUS EVERY 6 HOURS PRN
Status: DISCONTINUED | OUTPATIENT
Start: 2024-08-29 | End: 2024-09-06 | Stop reason: HOSPADM

## 2024-08-29 RX ORDER — ONDANSETRON 4 MG/1
4 TABLET, ORALLY DISINTEGRATING ORAL EVERY 8 HOURS PRN
Status: DISCONTINUED | OUTPATIENT
Start: 2024-08-29 | End: 2024-09-06 | Stop reason: HOSPADM

## 2024-08-29 RX ORDER — ACETAMINOPHEN 325 MG/1
650 TABLET ORAL EVERY 6 HOURS PRN
Status: DISCONTINUED | OUTPATIENT
Start: 2024-08-29 | End: 2024-09-06 | Stop reason: HOSPADM

## 2024-08-29 RX ORDER — IPRATROPIUM BROMIDE AND ALBUTEROL SULFATE 2.5; .5 MG/3ML; MG/3ML
1 SOLUTION RESPIRATORY (INHALATION) EVERY 4 HOURS PRN
Status: DISCONTINUED | OUTPATIENT
Start: 2024-08-29 | End: 2024-09-06 | Stop reason: HOSPADM

## 2024-08-29 RX ORDER — ASPIRIN 81 MG/1
81 TABLET, CHEWABLE ORAL DAILY
Status: DISCONTINUED | OUTPATIENT
Start: 2024-08-29 | End: 2024-09-06 | Stop reason: HOSPADM

## 2024-08-29 RX ORDER — LIDOCAINE HYDROCHLORIDE 10 MG/ML
INJECTION, SOLUTION EPIDURAL; INFILTRATION; INTRACAUDAL; PERINEURAL PRN
Status: COMPLETED | OUTPATIENT
Start: 2024-08-29 | End: 2024-08-29

## 2024-08-29 RX ORDER — OXYCODONE HYDROCHLORIDE 5 MG/1
5 TABLET ORAL EVERY 6 HOURS PRN
Status: DISCONTINUED | OUTPATIENT
Start: 2024-08-29 | End: 2024-08-31

## 2024-08-29 RX ORDER — IPRATROPIUM BROMIDE AND ALBUTEROL SULFATE 2.5; .5 MG/3ML; MG/3ML
1 SOLUTION RESPIRATORY (INHALATION) ONCE
Status: COMPLETED | OUTPATIENT
Start: 2024-08-29 | End: 2024-08-29

## 2024-08-29 RX ORDER — HEPARIN SODIUM 5000 [USP'U]/ML
5000 INJECTION, SOLUTION INTRAVENOUS; SUBCUTANEOUS EVERY 8 HOURS SCHEDULED
Status: DISCONTINUED | OUTPATIENT
Start: 2024-08-29 | End: 2024-08-29

## 2024-08-29 RX ORDER — ALBUTEROL SULFATE 90 UG/1
2 AEROSOL, METERED RESPIRATORY (INHALATION) EVERY 6 HOURS PRN
Status: DISCONTINUED | OUTPATIENT
Start: 2024-08-29 | End: 2024-09-06 | Stop reason: HOSPADM

## 2024-08-29 RX ORDER — ACETAMINOPHEN 650 MG/1
650 SUPPOSITORY RECTAL EVERY 6 HOURS PRN
Status: DISCONTINUED | OUTPATIENT
Start: 2024-08-29 | End: 2024-09-06 | Stop reason: HOSPADM

## 2024-08-29 RX ORDER — SODIUM CHLORIDE, SODIUM LACTATE, POTASSIUM CHLORIDE, CALCIUM CHLORIDE 600; 310; 30; 20 MG/100ML; MG/100ML; MG/100ML; MG/100ML
INJECTION, SOLUTION INTRAVENOUS CONTINUOUS
Status: DISCONTINUED | OUTPATIENT
Start: 2024-08-29 | End: 2024-08-29

## 2024-08-29 RX ORDER — SENNA AND DOCUSATE SODIUM 50; 8.6 MG/1; MG/1
1 TABLET, FILM COATED ORAL DAILY
Status: DISCONTINUED | OUTPATIENT
Start: 2024-08-29 | End: 2024-09-06 | Stop reason: HOSPADM

## 2024-08-29 RX ORDER — IPRATROPIUM BROMIDE AND ALBUTEROL SULFATE 2.5; .5 MG/3ML; MG/3ML
1 SOLUTION RESPIRATORY (INHALATION) EVERY 4 HOURS PRN
Status: DISCONTINUED | OUTPATIENT
Start: 2024-08-29 | End: 2024-08-29

## 2024-08-29 RX ORDER — AMLODIPINE BESYLATE 10 MG/1
10 TABLET ORAL DAILY
Status: DISCONTINUED | OUTPATIENT
Start: 2024-08-29 | End: 2024-09-02

## 2024-08-29 RX ORDER — AMIODARONE HYDROCHLORIDE 200 MG/1
200 TABLET ORAL DAILY
Status: DISCONTINUED | OUTPATIENT
Start: 2024-08-29 | End: 2024-09-06 | Stop reason: HOSPADM

## 2024-08-29 RX ORDER — HYDRALAZINE HYDROCHLORIDE 20 MG/ML
10 INJECTION INTRAMUSCULAR; INTRAVENOUS EVERY 6 HOURS PRN
Status: DISCONTINUED | OUTPATIENT
Start: 2024-08-29 | End: 2024-09-06 | Stop reason: HOSPADM

## 2024-08-29 RX ORDER — ATORVASTATIN CALCIUM 40 MG/1
40 TABLET, FILM COATED ORAL NIGHTLY
Status: DISCONTINUED | OUTPATIENT
Start: 2024-08-29 | End: 2024-09-06 | Stop reason: HOSPADM

## 2024-08-29 RX ORDER — BUDESONIDE AND FORMOTEROL FUMARATE DIHYDRATE 160; 4.5 UG/1; UG/1
2 AEROSOL RESPIRATORY (INHALATION)
Status: DISCONTINUED | OUTPATIENT
Start: 2024-08-29 | End: 2024-09-02 | Stop reason: ALTCHOICE

## 2024-08-29 RX ORDER — HEPARIN SODIUM 5000 [USP'U]/ML
5000 INJECTION, SOLUTION INTRAVENOUS; SUBCUTANEOUS EVERY 8 HOURS SCHEDULED
Status: DISCONTINUED | OUTPATIENT
Start: 2024-08-29 | End: 2024-09-06 | Stop reason: HOSPADM

## 2024-08-29 RX ADMIN — ATORVASTATIN CALCIUM 40 MG: 40 TABLET, FILM COATED ORAL at 20:25

## 2024-08-29 RX ADMIN — TIOTROPIUM BROMIDE INHALATION SPRAY 2 PUFF: 3.12 SPRAY, METERED RESPIRATORY (INHALATION) at 07:43

## 2024-08-29 RX ADMIN — ASPIRIN 81 MG: 81 TABLET, CHEWABLE ORAL at 13:12

## 2024-08-29 RX ADMIN — HYDROMORPHONE HYDROCHLORIDE 0.25 MG: 1 INJECTION, SOLUTION INTRAMUSCULAR; INTRAVENOUS; SUBCUTANEOUS at 06:41

## 2024-08-29 RX ADMIN — SENNOSIDES AND DOCUSATE SODIUM 1 TABLET: 50; 8.6 TABLET ORAL at 13:12

## 2024-08-29 RX ADMIN — HYDROMORPHONE HYDROCHLORIDE 0.25 MG: 1 INJECTION, SOLUTION INTRAMUSCULAR; INTRAVENOUS; SUBCUTANEOUS at 18:36

## 2024-08-29 RX ADMIN — BUDESONIDE AND FORMOTEROL FUMARATE DIHYDRATE 2 PUFF: 160; 4.5 AEROSOL RESPIRATORY (INHALATION) at 07:43

## 2024-08-29 RX ADMIN — IPRATROPIUM BROMIDE AND ALBUTEROL SULFATE 1 DOSE: .5; 2.5 SOLUTION RESPIRATORY (INHALATION) at 02:07

## 2024-08-29 RX ADMIN — HYDRALAZINE HYDROCHLORIDE 10 MG: 20 INJECTION INTRAMUSCULAR; INTRAVENOUS at 11:25

## 2024-08-29 RX ADMIN — ACETAMINOPHEN 650 MG: 325 TABLET ORAL at 13:12

## 2024-08-29 RX ADMIN — HEPARIN SODIUM 5000 UNITS: 5000 INJECTION INTRAVENOUS; SUBCUTANEOUS at 16:10

## 2024-08-29 RX ADMIN — LIDOCAINE HYDROCHLORIDE 6 ML: 10 INJECTION, SOLUTION EPIDURAL; INFILTRATION; INTRACAUDAL; PERINEURAL at 11:57

## 2024-08-29 RX ADMIN — AMLODIPINE BESYLATE 10 MG: 10 TABLET ORAL at 16:10

## 2024-08-29 RX ADMIN — ONDANSETRON 4 MG: 2 INJECTION INTRAMUSCULAR; INTRAVENOUS at 18:36

## 2024-08-29 RX ADMIN — SODIUM CHLORIDE, POTASSIUM CHLORIDE, SODIUM LACTATE AND CALCIUM CHLORIDE: 600; 310; 30; 20 INJECTION, SOLUTION INTRAVENOUS at 06:46

## 2024-08-29 RX ADMIN — ONDANSETRON 4 MG: 2 INJECTION INTRAMUSCULAR; INTRAVENOUS at 09:05

## 2024-08-29 ASSESSMENT — PAIN SCALES - GENERAL
PAINLEVEL_OUTOF10: 8
PAINLEVEL_OUTOF10: 7
PAINLEVEL_OUTOF10: 0
PAINLEVEL_OUTOF10: 10

## 2024-08-29 ASSESSMENT — PAIN DESCRIPTION - LOCATION
LOCATION: ABDOMEN
LOCATION: SHOULDER
LOCATION: CHEST;ELBOW

## 2024-08-29 ASSESSMENT — COPD QUESTIONNAIRES
QUESTION2_CHESTPHLEGM: 3
QUESTION5_HOMEACTIVITIES: 0
GOLD_GROUP: GROUP B
QUESTION6_LEAVINGHOUSE: 0
QUESTION1_COUGHFREQUENCY: 5
CAT_TOTALSCORE: 24
QUESTION4_WALKINCLINE: 3
TOTAL_EXACERBATIONS_PASTYEAR: 1
QUESTION8_ENERGYLEVEL: 5
QUESTION7_SLEEPQUALITY: 3
QUESTION3_CHESTTIGHTNESS: 5

## 2024-08-29 ASSESSMENT — PAIN DESCRIPTION - DESCRIPTORS
DESCRIPTORS: SHARP
DESCRIPTORS: ACHING
DESCRIPTORS: SHARP

## 2024-08-29 ASSESSMENT — PAIN - FUNCTIONAL ASSESSMENT
PAIN_FUNCTIONAL_ASSESSMENT: ACTIVITIES ARE NOT PREVENTED
PAIN_FUNCTIONAL_ASSESSMENT: ACTIVITIES ARE NOT PREVENTED
PAIN_FUNCTIONAL_ASSESSMENT: PREVENTS OR INTERFERES SOME ACTIVE ACTIVITIES AND ADLS

## 2024-08-29 ASSESSMENT — PULMONARY FUNCTION TESTS
FEV1 (%PREDICTED): 80
POST BRONCHODILATOR FEV1/FVC: 94
PIF_VALUE: 70

## 2024-08-29 ASSESSMENT — PAIN DESCRIPTION - ORIENTATION
ORIENTATION: RIGHT;LEFT
ORIENTATION: LEFT
ORIENTATION: RIGHT;LEFT

## 2024-08-29 NOTE — PROGRESS NOTES
Physical Therapy  Attempted to see pt this AM for PT evaluation but pt MARIO at special procedures. Will continue to attempt as schedule allows.

## 2024-08-29 NOTE — CONSULTS
Consult Interventional Radiology    Date:2024  Name:Luz Ordoñez   :1940   MR#:[id number]    SEX:female     Planned procedure:  ultrasound guided right thoracentesis  Indication: Right pleural effusion    H&P Status: H&P was reviewed, the patient was examined and no change has occurred in patient's condition since H&P was completed.    Past Medical History:  Past Medical History:   Diagnosis Date    COPD (chronic obstructive pulmonary disease) (HCC)         Past Surgical History:  History reviewed. No pertinent surgical history.    Social History:  Social History     Socioeconomic History    Marital status:      Spouse name: Not on file    Number of children: Not on file    Years of education: Not on file    Highest education level: Not on file   Occupational History    Not on file   Tobacco Use    Smoking status: Some Days     Current packs/day: 0.50     Average packs/day: 0.5 packs/day for 40.0 years (20.0 ttl pk-yrs)     Types: Cigarettes     Start date: 1984    Smokeless tobacco: Never   Substance and Sexual Activity    Alcohol use: Never    Drug use: Never    Sexual activity: Not on file   Other Topics Concern    Not on file   Social History Narrative    Not on file     Social Determinants of Health     Financial Resource Strain: Low Risk  (5/10/2024)    Received from [employer]    Financial Resource Strain     : 0   Food Insecurity: No Food Insecurity (2024)    Hunger Vital Sign     Worried About Running Out of Food in the Last Year: Never true     Ran Out of Food in the Last Year: Never true   Transportation Needs: No Transportation Needs (2024)    PRAPARE - Transportation     Lack of Transportation (Medical): No     Lack of Transportation (Non-Medical): No   Physical Activity: Inactive (2024)    Received from [employer]    Physical Activity     Number of minutes of exercise per week : 0   Stress: No Stress Concern Present

## 2024-08-29 NOTE — H&P
History and Physical      Name:  Luz Ordoñez /Age/Sex: 1940  (84 y.o. female)   MRN & CSN:  [id number] & [id number] Encounter Date/Time: 2024 4:26 AM EDT   Location:  [facility]3112-A PCP: Kayla Redding PA       Assessment and Plan:   Luz Ordoñez is a 84 y.o. female with metastatic cholangiocarcinoma, COPD/chronic respiratory failure 2 L NC O2, paroxysmal atrial fibrillation, hypertension, hyperlipidemia, peripheral artery disease presented as a transfer from [place] ER due to shortness of breath    Acute on chronic respiratory failure with hypoxia  Probably related to bilateral pleural effusions, ?malignant effusions low suspicion for congestive heart failure due to lack of clinical features  Chest x-ray personally reviewed bilateral pleural effusions L>R  Supplemental O2 to maintain saturation above 88%  Thoracentesis, pleural fluid analysis with cytology    JUANCARLOS  Urinalysis urine electrolytes bladder scan  IV NS infusion due to poor oral intake  Metabolic panel in a.m.    Metastatic cholangiocarcinoma  Cancer-related pain  CT abdomen and pelvis from 2024 reviewed trace bilateral pleural effusions ill-defined multifocal mass occupying right lobe of the liver, satellite lesions are present, right portal vein occlusion is noted.  Optimize pain regimen  Scheduled to see Dr. Car hence will consult inpatient  Given extent of disease and poor performance status candidate for hospice evaluation    Chronic thrombocytopenia  Probably related to underlying malignancy  Monitor CBC    COPD  Continue Symbicort and Spiriva as needed DuoNeb    Paroxysmal A-fib  Continue home amiodarone    Inpatient stepdown telemetry  DNR CCA    Disposition:     Current Living situation: Home with daughter  Expected Disposition: Home  Estimated D/C: 2 days    Diet ADULT DIET; Regular; No Added Salt (3-4 gm)   DVT Prophylaxis [] Lovenox, [x]  Heparin, [] SCDs, [] Ambulation,  [] Eliquis, [] Xarelto, [] Coumadin  (5/10/2024)    Received from [facility] [employer] [facility]    Stress     : 1   Social Connections: Low Risk  (5/10/2024)    Received from [facility]    Family and Community Support     : 0     : 0    [facility]    Abuse Screen   Housing Stability: Low Risk  (8/29/2024)    Housing Stability Vital Sign     Unable to Pay for Housing in the Last Year: No     Number of Times Moved in the Last Year: 1     Homeless in the Last Year: No       Medications:   Medications:    budesonide-formoterol  2 puff Inhalation BID RT    tiotropium  2 puff Inhalation Daily RT    heparin (porcine)  5,000 Units SubCUTAneous 3 times per day    amiodarone  200 mg Oral Daily    aspirin  81 mg Oral Daily    atorvastatin  40 mg Oral Nightly    sennosides-docusate sodium  1 tablet Oral Daily      Infusions:    lactated ringers IV soln       PRN Meds: ondansetron, 4 mg, Q8H PRN   Or  ondansetron, 4 mg, Q6H PRN  acetaminophen, 650 mg, Q6H PRN   Or  acetaminophen, 650 mg, Q6H PRN  albuterol sulfate HFA, 2 puff, Q6H PRN  oxyCODONE, 5 mg, Q6H PRN  HYDROmorphone, 0.25 mg, Q4H PRN  ipratropium 0.5 mg-albuterol 2.5 mg, 1 Dose, Q4H PRN        Labs      CBC:   Recent Labs     08/28/24  2345   WBC 12.4*   HGB 13.5   PLT 86*     BMP:    Recent Labs     08/28/24  2345      K 4.9   CL 99   CO2 31   BUN 24*   CREATININE 1.3*   GLUCOSE 145*     Hepatic:   Recent Labs     08/28/24  2345   AST 20   ALT 19   BILITOT 0.5   ALKPHOS 305*     BNP:   Recent Labs     08/28/24  2345   PROBNP 804.4*     Imaging/Diagnostics Last 24 Hours     XR CHEST PORTABLE    Result Date: 8/28/2024  Chest X-ray INDICATION: Chest pain COMPARISON: 3/1/2022 TECHNIQUE: AP/PA view of the chest was obtained. FINDINGS: Large left pleural effusion and small right pleural effusion with bibasilar atelectasis..  The heart size is normal.  The bony thorax is intact.      Large left pleural effusion and small right pleural

## 2024-08-29 NOTE — CARE COORDINATION
Attempted to see pt for d/c planning.  Pt is out of room at this time.  CM will attempt to see pt later today.  TE

## 2024-08-29 NOTE — PROGRESS NOTES
4 Eyes Skin Assessment     NAME:  Luz Ordoñez  YOB: 1940  MEDICAL RECORD NUMBER:  [id number]    The patient is being assessed for  Admission    I agree that at least one RN has performed a thorough Head to Toe Skin Assessment on the patient. ALL assessment sites listed below have been assessed.      Areas assessed by both nurses:    Head, Face, Ears, Shoulders, Back, Chest, Arms, Elbows, Hands, Sacrum. Buttock, Coccyx, Ischium, and Legs. Feet and Heels        Does the Patient have a Wound? Yes wound(s) were present on assessment. LDA wound assessment was Initiated and completed by RN       Erick Prevention initiated by RN: Yes  Wound Care Orders initiated by RN: No    Pressure Injury (Stage 3,4, Unstageable, DTI, NWPT, and Complex wounds) if present, place Wound referral order by RN under : No    New Ostomies, if present place, Ostomy referral order under : No     Nurse 1 eSignature: Electronically signed by Inna aMyfield RN on 8/29/24 at 7:49 AM EDT    **SHARE this note so that the co-signing nurse can place an eSignature**    Nurse 2 eSignature: Electronically signed by Butch Moura RN on 8/29/24 at 8:00 AM EDT

## 2024-08-29 NOTE — BRIEF OP NOTE
Department of Interventional Radiology Post-Procedure Note      Date: 8/29/2024     Interventional Radiologist: Joao    Procedure Performed:  Right thoracetesis    H&P Status: H&P was reviewed, the patient was examined and no change has occurred in patient's condition since H&P was completed.    Pre-procedure Diagnosis:  right pleural effusion    Post-procedure Diagnosis:  same    Sedation:  none    Contrast used:  none      Estimated blood loss:  Minimal    Preliminary Findings:  Successful    Complications:  none    Full Report to Follow.    Electronically signed by Alexia Shepherd MD on 8/29/2024 at 12:05 PM

## 2024-08-29 NOTE — PROGRESS NOTES
Current GOLD classification       GOLD Stage:    Group:    Recorded domestic exacerbations past 12 months:    Current recorded COPD Assessment Tool (CAT) score of    Current eosinophil count: 0.4     Inhaler Device   Acceptable for Use   Respimat  Not Breath Actuated Yes   MDI  Not Breath Actuated Yes           DPI  Observed PIF   using  In-Check Meter   Optimal PIF   Acceptable for Use   HANDIHALER 70 >30 Y   Pressair 70 >45 Y   NEOHALER 70 >50 Y   Diskus 70 >60 Y   ELLIPTA 70 >60 Y     Records show this patient was using ALBUTEROL, SYMBICORT, TRELEGY, DUONEB maintenance therapy prior to admission.          LONG-ACTING (LABA)   Arformoterol (Brovana) NEBULIZER   Indacaterol (Arcapta) NEOHALER   Olodaterol (Striverdi) Respimat   Salmeterol (Serevent) MDI, DISKUS   LONG-ACTING (LAMA)   Aclidinium bromide (Tudorza) PRESSAIR   Glycopyrronium bromide (Seebri) NEOHALER   Tiotropium (Spiriva) Respimat, HANDIHALER   Umeclidinium (Incruse) ELLIPTA   (LABA/LAMA)   Formoterol/glycopyrronium (Bevespi) MDI   Indacaterol/glycopyrronium (Utibron) NEOHALER   Vilanterol/umeclidinium (Anoro) ELLIPTA   Olodaterol/tiotropium (Stiolto) Respimat   (LABA/ICS)   Formoterol/budesomide (Symbicort) MDI   Formoterol/mometasone (Dulera) MDI   Salmeterol/fluticasone (Advair) MDI, DISKUS   Vilanterol/fluticasone (Breo) ELLIPTA   (LABA/LAMA/ICS)   Fluticasone/umeclidinium/vilanterol (Trelegy) ELLIPTA   Budesonide/glycopyrrolate/formoterol fumarate (Beztri) aerosphere      08/29/24 1424   Spirometry Assessment   FEV1 (%PRED) 80   Post Bronchodilator FEV/FVC 94   COPD Exacerbations in last year 1   PIF 70 L/min   COPD Assessment (CAT Score)   Cough Assessment 5   Phlegm Assessment 3   Chest tightness 5   Walking on an incline 3   Home Activities 0   Confident Leaving The Home 0   Sleeping Soundly 3   Have Energy 5   Assessment Score 24   $RT COPD Assessment Yes   GOLD Staging   Group Group B

## 2024-08-29 NOTE — ED TRIAGE NOTES
Pt having Shortness of Breath, cough is on hospice. Pt called them said to come to ER. Pt has antibiotics called in unable to get them.

## 2024-08-29 NOTE — PROGRESS NOTES
TRANSFER - OUT REPORT:    Verbal report given to Lavonne on Luz Ordoñez being transferred to 3 for routine post-op       Report consisted of patient's Situation, Background, Assessment and   Recommendations(SBAR).     Information from the following report(s) Nurse Handoff Report was reviewed with the receiving nurse.    Opportunity for questions and clarification was provided.      Patient transported with:   Registered Nurse

## 2024-08-29 NOTE — PROGRESS NOTES
PT to pre op holding for lung biopsy, pt alert and oriented x 4, denies pain, VSS, IV in place, labs in process.

## 2024-08-29 NOTE — CONSULTS
Hematology Oncology Inpatient Consult    Patient Name:  Luz Ordoñez  Patient :  1940  Patient MRN:  [id number]  Room: [address] [place], [place] [address]   Admitted: 2024 10:44 PM   Hospital Attending Provider: Zack Culver MD    PCP:  Kayla Redding PA     Date of Service: 24       Reason for Consult: cholangiocarcinoma     Chief Complaint:    Chief Complaint   Patient presents with    Shortness of Breath    Cough     Pt having Shortness of Breath, cough is on hospice. Pt called them said to come to ER. Pt has antibiotics called in unable to get them.     Principal Problem:    Acute on chronic respiratory failure (HCC)  Resolved Problems:    * No resolved hospital problems. *      HPI:   Luz Ordoñez is a 84 y.o. female with a history of COPD on 2L oxygen at baseline who presented to [facility] complaining of SOB. She was diagnosed with  stage IV intrahepatic cholangiocarcinoma in May 2024 with diffuse osseous metastatic disease at time of diagnosis. Received palliative radiation therapy to the right liver mass 3750 cGy in 15 fractions completed 2024. Dr Franco at [facility] discussed systemic treatment with her and family and her concerns about tolerance given poor performance status and recommended hospice evaluation. It does not appear any NGS was requested. She enrolled in hospice and has since moved to [place] to live with her daughter. She had worsening shortness of breath that came on within a few days and hospice nurse was unable to provide adequate symptom relief and so presented to the ED.  She was found to have large L pleural effusion, small R pleural effusion. She is planned to have thoracentesis today, fluid studies and cytology are ordered. She is requiring 6L oxygen currently.      On exam she is alert and oriented, in no acute distress. States she is SOB worse with any exertion or taking a deep breath. She has persistent upper abdominal discomfort that     sennosides-docusate sodium (SENOKOT-S) 8.6-50 MG tablet Take 1 tablet by mouth daily      lactulose (CHRONULAC) 10 GM/15ML solution Take 30 mLs by mouth 3 times daily      traMADol (ULTRAM) 50 MG tablet Take 1 tablet by mouth every 6 hours as needed for Pain.      atorvastatin (LIPITOR) 40 MG tablet Take 1 tablet by mouth nightly 30 tablet 0    aspirin 81 MG EC tablet Take 1 tablet by mouth daily      fluticasone-umeclidin-vilant (TRELEGY ELLIPTA) 100-62.5-25 MCG/INH AEPB Inhale 1 puff into the lungs in the morning. 1 each 5    montelukast (SINGULAIR) 10 MG tablet Take 1 tablet by mouth in the morning. 30 tablet 3    ipratropium-albuterol (DUONEB) 0.5-2.5 (3) MG/3ML SOLN nebulizer solution USE 1 VIAL IN [facility] 4 TIMES DAILY 360 mL 1    albuterol sulfate HFA (PROVENTIL HFA) 108 (90 Base) MCG/ACT inhaler Inhale 2 puffs into the lungs every 6 hours as needed for Wheezing or Shortness of Breath 18 g 0    budesonide-formoterol (SYMBICORT) 160-4.5 MCG/ACT AERO Inhale 2 puffs into the lungs 2 times daily 1 Inhaler 5    HYDROcodone-acetaminophen (NORCO) 5-325 MG per tablet Take 1 tablet by mouth every 6 hours as needed for Pain. Max Daily Amount: 4 tablets (Patient not taking: Reported on 8/29/2024)          Review of Systems:  Review of Systems   Constitutional:  Positive for fatigue. Negative for chills and fever.   HENT:   Negative for nosebleeds and trouble swallowing.    Eyes:  Negative for eye problems and icterus.   Respiratory:  Positive for chest tightness and shortness of breath. Negative for cough and hemoptysis.    Cardiovascular:  Negative for chest pain and leg swelling.   Gastrointestinal:  Positive for abdominal distention and constipation. Negative for diarrhea, nausea and vomiting.   Genitourinary:  Positive for difficulty urinating. Negative for dysuria and hematuria.    Musculoskeletal:  Positive for arthralgias. Negative for myalgias.   Skin:  Negative for rash and wound.   Neurological:  Negative for

## 2024-08-29 NOTE — CARE COORDINATION
.CM met with pt and daughter/Estella for d/c planning.  Introduced self, updated white board and explained role of CM.  Pt gave permission for CM to discuss d/c planning with her daughter at bedside.  Pt recently moved here from [place] and lives with daughter/Estella. Daughter is her caregiver d/t pt needs assistance with all ADL's.  Pt has a rollator walker, hospital bed, shower chair and an O2 concentrator.  She states that pt was active with [facility] on admission.  [facility] provides the hospital bed and O2 concentrator.  Daughter states that they had to revoke Hospice d/t admission in the hospital.  Pt and daughter want pt to return home with [facility].  JOSELINE notified [facility] [phone] that pt wants to resume Hospice with them when she is discharged.  He informed CM that they will need to be notified at [phone] and will need a Hospice order requesting Eval & Treat, H&P, Imaging, labs and AVS faxed to [phone] when pt is discharged.  TE    NOTIFY [facility] -[phone] AND FAX THE HOSPICE ORDER TO EVAL & TREAT, H&P, IMAGING, AND AVS TO ATTN: [facility] [phone] WHEN PT IS DISCHARGED.           08/29/24 1306   Service Assessment   Patient Orientation Alert and Oriented   History Provided By Patient;Child/Family   Primary Caregiver Family   Accompanied By/Relationship DAUGHTER/ESTELLA   Support Systems Children   Patient's Healthcare Decision Maker is: Legal Next of Kin  (SELF)   PCP Verified by CM   (DOES NOT HAVE A PCP/DAUGHTER IS WORKING ON GETTING HER A PCP/PCP LIST ADDED TO D/C INSTRUCTIONS.)   Prior Functional Level Assistance with the following:;Bathing;Dressing;Toileting;Cooking;Housework;Mobility   Current Functional Level Assistance with the following:;Bathing;Dressing;Toileting;Mobility   Can patient return to prior living arrangement Yes   Ability to make needs known: Good   Family able to assist with home care needs: Yes   Would you

## 2024-08-29 NOTE — ED PROVIDER NOTES
The history is provided by the patient and a relative.   Shortness of Breath  Patient reports to the emergency department with the chief complaint of increasing shortness of breath.  Patient has been having increasing shortness of breath which has been worsening throughout the day.  In addition the patient has had a nonproductive cough which is also been worsening throughout the day.  Patient has a known history of COPD as well as hypertension.  In addition the patient also has known history of cholangiocarcinoma with metastatic disease to the liver.  The patient used to live in [place] where she was on hospice.  The patient was a DNR comfort care but she has since moved to [place] and is seeking new hospice coverage as well as new physicians in this area.  The patient moved to [place] in order to be closer to family members due to her disease  The patient and the family reports that she has had pneumonia in the past and she has also had exacerbations of her COPD that have required hospitalizations.  Patient currently has not been having any fever, chills, nausea, vomiting or diarrhea.  The patient is also not having any chest pain but she has been having increasing dyspnea on exertion.  The patient is normally on 2 L of oxygen at home however when the patient began having shortness of breath and increasing cough the family members removed her from her oxygen and drove her to the emergency department because it was a short drive.  Therefore, the patient arrived at the emergency department in respiratory distress with low oxygen saturation    Review of Systems   Constitutional: Negative.    HENT: Negative.     Eyes: Negative.    Respiratory:  Positive for shortness of breath.    Cardiovascular: Negative.    Gastrointestinal: Negative.    Genitourinary: Negative.    Musculoskeletal: Negative.    Skin: Negative.    Neurological: Negative.    All other systems reviewed and are negative.      History reviewed. No pertinent

## 2024-08-30 LAB
ALBUMIN SERPL-MCNC: 3.6 GM/DL (ref 3.4–5)
ALP BLD-CCNC: 291 IU/L (ref 40–128)
ALT SERPL-CCNC: 23 U/L (ref 10–40)
ANION GAP SERPL CALCULATED.3IONS-SCNC: 8 MMOL/L (ref 7–16)
AST SERPL-CCNC: 24 IU/L (ref 15–37)
BILIRUB SERPL-MCNC: 0.5 MG/DL (ref 0–1)
BUN SERPL-MCNC: 23 MG/DL (ref 6–23)
CALCIUM SERPL-MCNC: 8.8 MG/DL (ref 8.3–10.6)
CHLORIDE BLD-SCNC: 100 MMOL/L (ref 99–110)
CO2: 30 MMOL/L (ref 21–32)
CREAT SERPL-MCNC: 1.1 MG/DL (ref 0.6–1.1)
FLUID TYPE: NORMAL INDEX
GFR, ESTIMATED: 50 ML/MIN/1.73M2
GLUCOSE SERPL-MCNC: 96 MG/DL (ref 70–99)
GLUCOSE, FLUID: 179 MG/DL
LACTATE DEHYDROGENASE, FLUID: 177 IU/L
LYMPHOCYTES, BODY FLUID: 67 %
MESOTHELIAL FLUID: 4 /100 WBC
MONOCYTE, FLUID: 24 %
NEUTROPHIL, FLUID: 9 %
POTASSIUM SERPL-SCNC: 5.1 MMOL/L (ref 3.5–5.1)
PROTEIN FLUID: 3.8 G/DL
RBC FLUID: 3000 /CU MM
SODIUM BLD-SCNC: 138 MMOL/L (ref 135–145)
TOTAL PROTEIN: 5.8 GM/DL (ref 6.4–8.2)
WBC FLUID: 389 /CU MM

## 2024-08-30 PROCEDURE — 97116 GAIT TRAINING THERAPY: CPT

## 2024-08-30 PROCEDURE — 80053 COMPREHEN METABOLIC PANEL: CPT

## 2024-08-30 PROCEDURE — 94640 AIRWAY INHALATION TREATMENT: CPT

## 2024-08-30 PROCEDURE — 97166 OT EVAL MOD COMPLEX 45 MIN: CPT

## 2024-08-30 PROCEDURE — 6370000000 HC RX 637 (ALT 250 FOR IP): Performed by: STUDENT IN AN ORGANIZED HEALTH CARE EDUCATION/TRAINING PROGRAM

## 2024-08-30 PROCEDURE — 97162 PT EVAL MOD COMPLEX 30 MIN: CPT

## 2024-08-30 PROCEDURE — 6360000002 HC RX W HCPCS: Performed by: STUDENT IN AN ORGANIZED HEALTH CARE EDUCATION/TRAINING PROGRAM

## 2024-08-30 PROCEDURE — 94761 N-INVAS EAR/PLS OXIMETRY MLT: CPT

## 2024-08-30 PROCEDURE — 36415 COLL VENOUS BLD VENIPUNCTURE: CPT

## 2024-08-30 PROCEDURE — 2700000000 HC OXYGEN THERAPY PER DAY

## 2024-08-30 PROCEDURE — 99232 SBSQ HOSP IP/OBS MODERATE 35: CPT | Performed by: PHYSICIAN ASSISTANT

## 2024-08-30 PROCEDURE — 97530 THERAPEUTIC ACTIVITIES: CPT

## 2024-08-30 PROCEDURE — 2140000000 HC CCU INTERMEDIATE R&B

## 2024-08-30 RX ORDER — LACTULOSE 10 G/15ML
20 SOLUTION ORAL 3 TIMES DAILY
Status: DISCONTINUED | OUTPATIENT
Start: 2024-08-30 | End: 2024-09-06 | Stop reason: HOSPADM

## 2024-08-30 RX ADMIN — HYDROMORPHONE HYDROCHLORIDE 0.25 MG: 1 INJECTION, SOLUTION INTRAMUSCULAR; INTRAVENOUS; SUBCUTANEOUS at 01:19

## 2024-08-30 RX ADMIN — LACTULOSE 20 G: 10 SOLUTION ORAL at 14:32

## 2024-08-30 RX ADMIN — HEPARIN SODIUM 5000 UNITS: 5000 INJECTION INTRAVENOUS; SUBCUTANEOUS at 05:52

## 2024-08-30 RX ADMIN — HEPARIN SODIUM 5000 UNITS: 5000 INJECTION INTRAVENOUS; SUBCUTANEOUS at 14:32

## 2024-08-30 RX ADMIN — ONDANSETRON 4 MG: 2 INJECTION INTRAMUSCULAR; INTRAVENOUS at 20:53

## 2024-08-30 RX ADMIN — AMIODARONE HYDROCHLORIDE 200 MG: 200 TABLET ORAL at 08:09

## 2024-08-30 RX ADMIN — LACTULOSE 20 G: 10 SOLUTION ORAL at 20:54

## 2024-08-30 RX ADMIN — BUDESONIDE AND FORMOTEROL FUMARATE DIHYDRATE 2 PUFF: 160; 4.5 AEROSOL RESPIRATORY (INHALATION) at 21:29

## 2024-08-30 RX ADMIN — OXYCODONE HYDROCHLORIDE 5 MG: 5 TABLET ORAL at 08:11

## 2024-08-30 RX ADMIN — ASPIRIN 81 MG: 81 TABLET, CHEWABLE ORAL at 08:09

## 2024-08-30 RX ADMIN — ACETAMINOPHEN 650 MG: 325 TABLET ORAL at 20:52

## 2024-08-30 RX ADMIN — TIOTROPIUM BROMIDE INHALATION SPRAY 2 PUFF: 3.12 SPRAY, METERED RESPIRATORY (INHALATION) at 09:37

## 2024-08-30 RX ADMIN — SENNOSIDES AND DOCUSATE SODIUM 1 TABLET: 50; 8.6 TABLET ORAL at 08:10

## 2024-08-30 RX ADMIN — BUDESONIDE AND FORMOTEROL FUMARATE DIHYDRATE 2 PUFF: 160; 4.5 AEROSOL RESPIRATORY (INHALATION) at 09:37

## 2024-08-30 RX ADMIN — LACTULOSE 20 G: 10 SOLUTION ORAL at 09:47

## 2024-08-30 ASSESSMENT — PAIN DESCRIPTION - DESCRIPTORS
DESCRIPTORS: ACHING
DESCRIPTORS: ACHING

## 2024-08-30 ASSESSMENT — PAIN DESCRIPTION - LOCATION
LOCATION: ARM;SHOULDER
LOCATION: BACK
LOCATION: BACK;ARM

## 2024-08-30 ASSESSMENT — PAIN SCALES - GENERAL
PAINLEVEL_OUTOF10: 3
PAINLEVEL_OUTOF10: 0
PAINLEVEL_OUTOF10: 0
PAINLEVEL_OUTOF10: 7
PAINLEVEL_OUTOF10: 10

## 2024-08-30 ASSESSMENT — PAIN SCALES - WONG BAKER: WONGBAKER_NUMERICALRESPONSE: NO HURT

## 2024-08-30 ASSESSMENT — PAIN - FUNCTIONAL ASSESSMENT: PAIN_FUNCTIONAL_ASSESSMENT: PREVENTS OR INTERFERES SOME ACTIVE ACTIVITIES AND ADLS

## 2024-08-30 ASSESSMENT — PAIN DESCRIPTION - ORIENTATION
ORIENTATION: LEFT
ORIENTATION: RIGHT;LEFT

## 2024-08-30 NOTE — PROGRESS NOTES
Occupational Therapy  [facility] ACUTE CARE OCCUPATIONAL THERAPY EVALUATION  Luz Ordoñez, 1940, 3112/3112-A, 8/30/2024    Discharge Recommendation: Encourage facility for moderate post-acute rehabilitation, anticipate 1-2 hours per day and 5 days per week.    History  [place]:  The primary encounter diagnosis was Pleural effusion. A diagnosis of Dyspnea and respiratory abnormalities was also pertinent to this visit.  Patient  has a past medical history of COPD (chronic obstructive pulmonary disease) ([facility]).  Patient  has no past surgical history on file.    Subjective:  Patient comments: \"I'm not from [place], I'm from [place]!\".    Pain:  Denied.    Communication with other providers: Nurse stephanie session, co-eval with PT Krista for safety/tolerance.  Restrictions: General Precautions, Fall Risk    Home Setup/Prior level of function  Social/Functional History  Lives With: Daughter  Type of Home: Mobile home  Home Layout: One level  Bathroom Shower/Tub: Tub/Shower unit  Bathroom Toilet: Standard  Bathroom Equipment: Shower chair  Bathroom Accessibility: Accessible  Home Equipment: Oxygen, Hospital bed, Rollator (O2 CONCENTRATOR)  Receives Help From: Family  ADL Assistance: Needs assistance  Ambulation Assistance: Needs assistance  Transfer Assistance: Needs assistance  Active : No  Patient's  Info: DAUGHTER PROVIDES HER TRANSPORTATION  Mode of Transportation: Car    Examination of body systems (includes body structures/functions, activity/participation limitations):  Observation:  Semi-fowlers in bed upon arrival, agreeable to therapy, daughter at bedside  Vision: Glasses  Hearing:  [place]  Cardiopulmonary:  On 2L O2, tele, vitals remained stable throughout session    Body Systems and functions:  ROM R/L:  WFL    Strength R/L:  BUE 3+/5,   4/5  Sensation: WFL  Tone: Normal  Coordination: WFL  Perception: WFL    Cognitive and Psychosocial Functioning:  Overall cognitive status:  Pt  return to home   Goal 5: Pt will perform functional transfers to/from bed, chair, and toilet Net[place]  Goal 6: Pt will perform therex/theract in order to increase functional activity tolerance  Goal 7: Pt will perform all aspects of session w/ G safety awareness to demonstrate capability to safely discharge to a lower level of supervision/assistance     Treatment plan:  Pt will perform therex/theract in order to increase functional activity tolerance in preparation for ADL participation.     Recommendations for NURSING activity: Up to chair for all 3 meals and up to bathroom for all toileting needs    Time:   Time in: 0950  Time out: 1009  Timed treatment minutes: 9  Total time: 19 minutes     Electronically signed by:    ROBI Lee/EVITA [id number]  8/30/2024, 1:33 PM

## 2024-08-30 NOTE — PROGRESS NOTES
Hematology Oncology Inpatient Progress Note    Patient Name:  Luz Ordoñez  Patient :  1940  Patient MRN:  [id number]  Room: [address] [place], [place] [address]   Admitted: 2024 10:44 PM   Hospital Attending Provider: Zack Culver MD    PCP:  Kayla Redding PA     Date of Service: 24       Reason for Consult: cholangiocarcinoma     Chief Complaint:    Chief Complaint   Patient presents with    Shortness of Breath    Cough     Pt having Shortness of Breath, cough is on hospice. Pt called them said to come to ER. Pt has antibiotics called in unable to get them.     Principal Problem:    Acute on chronic respiratory failure (HCC)  Active Problems:    Pleural effusion  Resolved Problems:    * No resolved hospital problems. *      HPI:   Luz Ordoñez is a 84 y.o. female with a history of COPD on 2L oxygen at baseline who presented to [facility] complaining of SOB. She was diagnosed with  stage IV intrahepatic cholangiocarcinoma in May 2024 with diffuse osseous metastatic disease at time of diagnosis. Received palliative radiation therapy to the right liver mass 3750 cGy in 15 fractions completed 2024. Dr Franco at [facility] discussed systemic treatment with her and family and her concerns about tolerance given poor performance status and recommended hospice evaluation. It does not appear any NGS was requested. She enrolled in hospice and has since moved to [place] to live with her daughter. She had worsening shortness of breath that came on within a few days and hospice nurse was unable to provide adequate symptom relief and so presented to the ED.  She was found to have large L pleural effusion, small R pleural effusion. She is planned to have thoracentesis today, fluid studies and cytology are ordered. She is requiring 6L oxygen currently.      On exam she is alert and oriented, in no acute distress. States she is SOB worse with any exertion or taking a deep breath. She has  persistent upper abdominal discomfort that radiates up her L shoulder. She feels bloated and has had difficulty urinating. Discussed with her and her daughter the disease course to date and expectations. She requires assistance for all ADL but her daughter has been able to provide her this level in the home. She wants better pain control but has poor tolerance to opiates.  No fever/chills. No cough or sputum production. No N/V. Has some constipation. No HA or dizziness. No confusion or focal weakness.     Lab data reviewed. WBC 7.6. Hgb 14. MCV 97. MCHC 31.9. Plt 83k, appears to be chronically low since at least 2021, plt clumping noted on smear. Cr 1.1. LFT with , otherwise unremarkable.    Interval History    8/30/2024    Seen and examined this morning. Significant improvement in SOB after thoracentesis. Cytology remains pending. Reviewed with her again hospice vs potential immunotherapy and left written patient information for her.    No CBC this am. Stable ALP elevation to 291. Cr 1.1. No electrolyte derangements.    Past Medical History:   Diagnosis Date    COPD (chronic obstructive pulmonary disease) (HCC)        History reviewed. No pertinent surgical history.                                                                             Social History     Socioeconomic History    Marital status:      Spouse name: Not on file    Number of children: Not on file    Years of education: Not on file    Highest education level: Not on file   Occupational History    Not on file   Tobacco Use    Smoking status: Some Days     Current packs/day: 0.50     Average packs/day: 0.5 packs/day for 40.0 years (20.0 ttl pk-yrs)     Types: Cigarettes     Start date: 8/30/1984    Smokeless tobacco: Never   Substance and Sexual Activity    Alcohol use: Never    Drug use: Never    Sexual activity: Not on file   Other Topics Concern    Not on file   Social History Narrative    Not on file     Social Determinants of Health

## 2024-08-30 NOTE — CARE COORDINATION
Chart reviewed. The discharge plan is home with daughter and [facility]. Cm is following.     NOTIFY [facility] [phone] AND FAX THE HOSPICE ORDER TO EVAL & TREAT, H&P, IMAGING, AND AVS TO ATTN: [place] [phone] WHEN PT IS DISCHARGED.

## 2024-08-30 NOTE — PROGRESS NOTES
V2.0    [facility] [place] Progress Note      Name:  Luz Ordoñez /Age/Sex: 1940  (84 y.o. female)   MRN & CSN:  [id number] & [id number] Encounter Date/Time: 2024 7:56 AM EDT   Location:  [facility]-A PCP: Kayla Redding PA     Attending:Zack Culver MD       Hospital Day: 3    Assessment and Recommendations   Luz Ordoñez is a 84 y.o. female with pmh of metastatic cholangiocarcinoma, COPD/chronic respiratory failure 2 L NC O2, paroxysmal atrial fibrillation, hypertension, hyperlipidemia, peripheral artery disease  who presents with Acute on chronic respiratory failure (HCC)      # Acute on chronic respiratory failure with hypoxia  2/2  bilateral pleural effusions, ?malignant effusion: Presented with worsening shortness of breath,  Chest x-ray personally reviewed bilateral pleural effusions L>R  Supplemental O2 to maintain saturation above 88%, thoracentesis done on , pleural fluid analysis with cytology, heme-onc following.     # JUANCARLOS suspected prerenal: Urinalysis urine electrolytes bladder scan, IV NS infusion due to poor oral intake, resolved     # Metastatic cholangiocarcinoma  Cancer-related pain  CT abdomen and pelvis from 2024 reviewed trace bilateral pleural effusions ill-defined multifocal mass occupying right lobe of the liver, satellite lesions are present, right portal vein occlusion is noted.  Optimize pain regimen  Scheduled to see Dr. Car hence will consult inpatient  Given extent of disease and poor performance status candidate for hospice evaluation     # Chronic thrombocytopenia  Probably related to underlying malignancy  Monitor CBC     # COPD  Continue Symbicort and Spiriva as needed DuoNeb     # Paroxysmal A-fib  Continue home amiodarone    Comment: Please note this report has been produced using speech recognition software and may contain errors related to that system including errors in grammar, punctuation, and spelling, as well as words and phrases that

## 2024-08-30 NOTE — CONSULTS
[facility] PHYSICAL THERAPY EVALUATION  Luz Ordoñez, 1940, 3112/3112-A, 8/30/2024    History  [place]:  The primary encounter diagnosis was Pleural effusion. A diagnosis of Dyspnea and respiratory abnormalities was also pertinent to this visit.  Patient  has a past medical history of COPD (chronic obstructive pulmonary disease) ([facility]).  Patient  has no past surgical history on file.    Recommendation:Encourage facility for moderate post-acute rehabilitation, anticipate 1-2 hours per day and 5 days per week.    Subjective:  Patient states:  \"That chair hurt my back\"   Pain:  right side of back near thoracentesis site. Did not rate   Communication with other providers:   RN, co-eval with Ana GRAY   Restrictions: General precautions, falls     Home Setup/Prior level of function  Social/Functional History  Lives With: Daughter  Type of Home: Mobile home  Home Layout: One level  Home Access: Stairs to enter with rails  Entrance Stairs - Number of Steps: 4  Bathroom Shower/Tub: Tub/Shower unit  Bathroom Toilet: Handicap height  Bathroom Equipment: Shower chair  Bathroom Accessibility: Accessible  Home Equipment: Oxygen, Hospital bed, Rollator (O2 CONCENTRATOR)  Receives Help From: Family  ADL Assistance: Needs assistance (dtr helps with bathing and dressing)  Ambulation Assistance: Independent (Netta with rollator normally but has needed some help lately with feeling weak)  Transfer Assistance: Independent (indep typically but has needed some help lately with feeling weak)  Active : No  Patient's  Info: dtr  Mode of Transportation: Car    Examination of body systems (includes body structures/functions, activity/participation limitations):  Observation:  Semi fowlers in bed upon arrival. Cooperative with therapy. Daughter present   Vision:  WFL  Hearing:  WFL  Cardiopulmonary:  Stable vitals on 2L O2     Musculoskeletal  ROM R/L:  WFL BLEs  Strength R/L:  BLES grossly

## 2024-08-31 ENCOUNTER — APPOINTMENT (OUTPATIENT)
Dept: GENERAL RADIOLOGY | Age: 84
End: 2024-08-31
Payer: MEDICARE

## 2024-08-31 LAB
ALBUMIN SERPL-MCNC: 3.3 GM/DL (ref 3.4–5)
ALP BLD-CCNC: 294 IU/L (ref 40–128)
ALT SERPL-CCNC: 25 U/L (ref 10–40)
ANION GAP SERPL CALCULATED.3IONS-SCNC: 12 MMOL/L (ref 7–16)
AST SERPL-CCNC: 31 IU/L (ref 15–37)
BACTERIA: NEGATIVE /HPF
BILIRUB SERPL-MCNC: 0.4 MG/DL (ref 0–1)
BILIRUBIN, URINE: NEGATIVE MG/DL
BLOOD, URINE: NEGATIVE
BUN SERPL-MCNC: 23 MG/DL (ref 6–23)
CALCIUM OXALATE CRYSTALS: NORMAL /HPF
CALCIUM SERPL-MCNC: 8.4 MG/DL (ref 8.3–10.6)
CHLORIDE BLD-SCNC: 99 MMOL/L (ref 99–110)
CLARITY, UA: CLEAR
CO2: 29 MMOL/L (ref 21–32)
COLOR, UA: YELLOW
CREAT SERPL-MCNC: 1.2 MG/DL (ref 0.6–1.1)
GFR, ESTIMATED: 45 ML/MIN/1.73M2
GLUCOSE SERPL-MCNC: 80 MG/DL (ref 70–99)
GLUCOSE URINE: 100 MG/DL
KETONES, URINE: NEGATIVE MG/DL
LEUKOCYTE ESTERASE, URINE: NEGATIVE
NITRITE URINE, QUANTITATIVE: NEGATIVE
PH, URINE: 6 (ref 5–8)
POTASSIUM SERPL-SCNC: 4 MMOL/L (ref 3.5–5.1)
PROTEIN UA: 30 MG/DL
RBC URINE: 1 /HPF (ref 0–6)
SODIUM BLD-SCNC: 140 MMOL/L (ref 135–145)
SPECIFIC GRAVITY UA: 1.02 (ref 1–1.03)
SQUAMOUS EPITHELIAL: 1 /HPF
TOTAL PROTEIN: 5.4 GM/DL (ref 6.4–8.2)
TRICHOMONAS: NORMAL /HPF
UROBILINOGEN, URINE: 1 MG/DL (ref 0.2–1)
WBC UA: <1 /HPF (ref 0–5)

## 2024-08-31 PROCEDURE — 94761 N-INVAS EAR/PLS OXIMETRY MLT: CPT

## 2024-08-31 PROCEDURE — 6370000000 HC RX 637 (ALT 250 FOR IP): Performed by: STUDENT IN AN ORGANIZED HEALTH CARE EDUCATION/TRAINING PROGRAM

## 2024-08-31 PROCEDURE — 6370000000 HC RX 637 (ALT 250 FOR IP): Performed by: NURSE PRACTITIONER

## 2024-08-31 PROCEDURE — 94664 DEMO&/EVAL PT USE INHALER: CPT

## 2024-08-31 PROCEDURE — 2140000000 HC CCU INTERMEDIATE R&B

## 2024-08-31 PROCEDURE — 99232 SBSQ HOSP IP/OBS MODERATE 35: CPT | Performed by: INTERNAL MEDICINE

## 2024-08-31 PROCEDURE — 71045 X-RAY EXAM CHEST 1 VIEW: CPT

## 2024-08-31 PROCEDURE — 81001 URINALYSIS AUTO W/SCOPE: CPT

## 2024-08-31 PROCEDURE — 97535 SELF CARE MNGMENT TRAINING: CPT

## 2024-08-31 PROCEDURE — 97530 THERAPEUTIC ACTIVITIES: CPT

## 2024-08-31 PROCEDURE — 94640 AIRWAY INHALATION TREATMENT: CPT

## 2024-08-31 PROCEDURE — 80053 COMPREHEN METABOLIC PANEL: CPT

## 2024-08-31 PROCEDURE — 6360000002 HC RX W HCPCS: Performed by: STUDENT IN AN ORGANIZED HEALTH CARE EDUCATION/TRAINING PROGRAM

## 2024-08-31 PROCEDURE — 2700000000 HC OXYGEN THERAPY PER DAY

## 2024-08-31 PROCEDURE — 36415 COLL VENOUS BLD VENIPUNCTURE: CPT

## 2024-08-31 RX ORDER — HYDROXYZINE HYDROCHLORIDE 25 MG/1
25 TABLET, FILM COATED ORAL 3 TIMES DAILY PRN
Status: DISCONTINUED | OUTPATIENT
Start: 2024-08-31 | End: 2024-09-06 | Stop reason: HOSPADM

## 2024-08-31 RX ORDER — OXYCODONE HYDROCHLORIDE 5 MG/1
2.5 TABLET ORAL EVERY 6 HOURS PRN
Status: DISCONTINUED | OUTPATIENT
Start: 2024-08-31 | End: 2024-09-05

## 2024-08-31 RX ORDER — CALCIUM CARBONATE 500 MG/1
500 TABLET, CHEWABLE ORAL 3 TIMES DAILY PRN
Status: DISCONTINUED | OUTPATIENT
Start: 2024-08-31 | End: 2024-09-06 | Stop reason: HOSPADM

## 2024-08-31 RX ADMIN — HYDROXYZINE HYDROCHLORIDE 25 MG: 25 TABLET ORAL at 18:49

## 2024-08-31 RX ADMIN — SENNOSIDES AND DOCUSATE SODIUM 1 TABLET: 50; 8.6 TABLET ORAL at 10:21

## 2024-08-31 RX ADMIN — AMLODIPINE BESYLATE 10 MG: 10 TABLET ORAL at 10:21

## 2024-08-31 RX ADMIN — IPRATROPIUM BROMIDE AND ALBUTEROL SULFATE 1 DOSE: 2.5; .5 SOLUTION RESPIRATORY (INHALATION) at 17:47

## 2024-08-31 RX ADMIN — BUDESONIDE AND FORMOTEROL FUMARATE DIHYDRATE 2 PUFF: 160; 4.5 AEROSOL RESPIRATORY (INHALATION) at 09:16

## 2024-08-31 RX ADMIN — HYDROXYZINE HYDROCHLORIDE 25 MG: 25 TABLET ORAL at 00:53

## 2024-08-31 RX ADMIN — LACTULOSE 20 G: 10 SOLUTION ORAL at 20:28

## 2024-08-31 RX ADMIN — BUDESONIDE AND FORMOTEROL FUMARATE DIHYDRATE 2 PUFF: 160; 4.5 AEROSOL RESPIRATORY (INHALATION) at 21:04

## 2024-08-31 RX ADMIN — CALCIUM CARBONATE 500 MG: 500 TABLET, CHEWABLE ORAL at 23:06

## 2024-08-31 RX ADMIN — LACTULOSE 20 G: 10 SOLUTION ORAL at 10:21

## 2024-08-31 RX ADMIN — LACTULOSE 20 G: 10 SOLUTION ORAL at 14:57

## 2024-08-31 RX ADMIN — OXYCODONE HYDROCHLORIDE 2.5 MG: 5 TABLET ORAL at 08:50

## 2024-08-31 RX ADMIN — HEPARIN SODIUM 5000 UNITS: 5000 INJECTION INTRAVENOUS; SUBCUTANEOUS at 14:56

## 2024-08-31 RX ADMIN — TIOTROPIUM BROMIDE INHALATION SPRAY 2 PUFF: 3.12 SPRAY, METERED RESPIRATORY (INHALATION) at 09:16

## 2024-08-31 RX ADMIN — AMIODARONE HYDROCHLORIDE 200 MG: 200 TABLET ORAL at 10:20

## 2024-08-31 RX ADMIN — ASPIRIN 81 MG: 81 TABLET, CHEWABLE ORAL at 10:21

## 2024-08-31 ASSESSMENT — PAIN DESCRIPTION - LOCATION
LOCATION: BACK
LOCATION: ABDOMEN
LOCATION: CHEST
LOCATION: BACK

## 2024-08-31 ASSESSMENT — PAIN SCALES - GENERAL
PAINLEVEL_OUTOF10: 7
PAINLEVEL_OUTOF10: 8
PAINLEVEL_OUTOF10: 6
PAINLEVEL_OUTOF10: 5

## 2024-08-31 ASSESSMENT — PAIN DESCRIPTION - DESCRIPTORS
DESCRIPTORS: THROBBING
DESCRIPTORS: ACHING
DESCRIPTORS: STABBING

## 2024-08-31 ASSESSMENT — PAIN DESCRIPTION - ORIENTATION: ORIENTATION: MID

## 2024-08-31 ASSESSMENT — PAIN SCALES - WONG BAKER: WONGBAKER_NUMERICALRESPONSE: HURTS EVEN MORE

## 2024-08-31 ASSESSMENT — PAIN - FUNCTIONAL ASSESSMENT: PAIN_FUNCTIONAL_ASSESSMENT: PREVENTS OR INTERFERES SOME ACTIVE ACTIVITIES AND ADLS

## 2024-08-31 ASSESSMENT — PAIN DESCRIPTION - DIRECTION: RADIATING_TOWARDS: UPPER BACK AND SHOULDERS

## 2024-08-31 NOTE — PROGRESS NOTES
Occupational Therapy    Occupational Therapy Treatment Note    Name: Luz Ordoñez MRN: [id number] :   1940   Date:  2024   Admission Date: 2024 Room:  [facility]     Primary Problem:  Acute on chronic respiratory failure    Restrictions/Precautions:          General Precautions, Fall Risk    Communication with other providers: Nursing handoff     Subjective:  Patient states:  \"I think I gave it my all today\"  Pain:   Location, Type, Intensity (0/10 to 10/10):  4/10 pain in chest, constant, nurse aware    Objective:    Observation: Pt received supine in bed, agreeable to therapy   Objective Measures:  2L of 02; 02 saturation WFL throughout session, vitals stable    Treatment, including education:  Therapeutic Activity Training:   Therapeutic activity training was instructed today.  Cues were given for safety, sequence, UE/LE placement, awareness, and balance.    Activities performed today included bed mobility training, sup-sit, sit-stand, functional mobility, stand to sit    Self Care Training:   Cues were given for safety, sequence, UE/LE placement, visual cues, and balance.    Activities performed today included grooming, LB bathing/dressing, toileting    Supine to sit Supervision, sitting EOB Supervision. Grooming washing face/hands w/ warm washcloth Supervision, STS from EOB up to RW CGA, in stand to doff depends modA, LB bathing washing allan area/buttocks maxA, Stand to sit CGA. donning fresh depends in sit up to knees. STS from EOB up to RW CGA, in stand to thread over buttocks maxA.    Functional mobility from room w/ chair follow ~30 feet, stand to sit from RW to reclining chair CGA w/ Vcs for proper hand placement for good eccentric control.    Pt sitting upright in chair, chair alarm on, call lght at side, nursing notified           Assessment / Impression:    Patient's tolerance of treatment: Well  Adverse Reaction: None  Significant change in status and impact: Improved from

## 2024-08-31 NOTE — PROGRESS NOTES
Hematology Oncology Inpatient Progress Note    Patient Name:  Luz Ordoñez  Patient :  1940  Patient MRN:  [id number]  Room: [address] [place], [place] [address]A   Admitted: 2024 10:44 PM   Hospital Attending Provider: Zack Culver MD    PCP:  Kayla Redding PA     Date of Service: 24       Reason for Consult: cholangiocarcinoma     Chief Complaint:    Chief Complaint   Patient presents with    Shortness of Breath    Cough     Pt having Shortness of Breath, cough is on hospice. Pt called them said to come to ER. Pt has antibiotics called in unable to get them.     Principal Problem:    Acute on chronic respiratory failure (HCC)  Active Problems:    Pleural effusion  Resolved Problems:    * No resolved hospital problems. *      HPI:   Luz Ordoñez is a 84 y.o. female with a history of COPD on 2L oxygen at baseline who presented to [facility] complaining of SOB. She was diagnosed with  stage IV intrahepatic cholangiocarcinoma in May 2024 with diffuse osseous metastatic disease at time of diagnosis. Received palliative radiation therapy to the right liver mass 3750 cGy in 15 fractions completed 2024. Dr Franco at [facility] discussed systemic treatment with her and family and her concerns about tolerance given poor performance status and recommended hospice evaluation. It does not appear any NGS was requested. She enrolled in hospice and has since moved to [place] to live with her daughter. She had worsening shortness of breath that came on within a few days and hospice nurse was unable to provide adequate symptom relief and so presented to the ED.  She was found to have large L pleural effusion, small R pleural effusion. She is planned to have thoracentesis today, fluid studies and cytology are ordered. She is requiring 6L oxygen currently.      On exam she is alert and oriented, in no acute distress. States she is SOB worse with any exertion or taking a deep breath. She has                       History reviewed. No pertinent family history.                                                                                            No Known Allergies    No current facility-administered medications on file prior to encounter.     Current Outpatient Medications on File Prior to Encounter   Medication Sig Dispense Refill    acetaminophen (TYLENOL) 500 MG tablet Take 1 tablet by mouth every 6 hours as needed for Pain      lidocaine 4 % external patch Place 1 patch onto the skin daily      meclizine (ANTIVERT) 25 MG CHEW Take 1 tablet by mouth 3 times daily as needed      amiodarone (CORDARONE) 200 MG tablet Take 1 tablet by mouth daily      ondansetron (ZOFRAN) 4 MG tablet Take 2 tablets by mouth every 8 hours as needed for Nausea or Vomiting      sennosides-docusate sodium (SENOKOT-S) 8.6-50 MG tablet Take 1 tablet by mouth daily      lactulose (CHRONULAC) 10 GM/15ML solution Take 30 mLs by mouth 3 times daily      traMADol (ULTRAM) 50 MG tablet Take 1 tablet by mouth every 6 hours as needed for Pain.      atorvastatin (LIPITOR) 40 MG tablet Take 1 tablet by mouth nightly 30 tablet 0    aspirin 81 MG EC tablet Take 1 tablet by mouth daily      fluticasone-umeclidin-vilant (TRELEGY ELLIPTA) 100-62.5-25 MCG/INH AEPB Inhale 1 puff into the lungs in the morning. 1 each 5    montelukast (SINGULAIR) 10 MG tablet Take 1 tablet by mouth in the morning. 30 tablet 3    ipratropium-albuterol (DUONEB) 0.5-2.5 (3) MG/3ML SOLN nebulizer solution USE 1 VIAL IN [facility] 4 TIMES DAILY 360 mL 1    albuterol sulfate HFA (PROVENTIL HFA) 108 (90 Base) MCG/ACT inhaler Inhale 2 puffs into the lungs every 6 hours as needed for Wheezing or Shortness of Breath 18 g 0    budesonide-formoterol (SYMBICORT) 160-4.5 MCG/ACT AERO Inhale 2 puffs into the lungs 2 times daily 1 Inhaler 5    HYDROcodone-acetaminophen (NORCO) 5-325 MG per tablet Take 1 tablet by mouth every 6 hours as needed for Pain. Max Daily Amount: 4 tablets

## 2024-08-31 NOTE — PROGRESS NOTES
V2.0    [facility] [place] Note      Name:  Luz Ordoñez /Age/Sex: 1940  (84 y.o. female)   MRN & CSN:  [id number] & [id number] Encounter Date/Time: 2024 7:56 AM EDT   Location:  [facility] PCP: Kayla Redding PA     Attending:Zack Culver MD       Hospital Day: 4    Assessment and Recommendations   Luz Ordoñez is a 84 y.o. female with pmh of metastatic cholangiocarcinoma, COPD/chronic respiratory failure 2 L NC O2, paroxysmal atrial fibrillation, hypertension, hyperlipidemia, peripheral artery disease  who presents with Acute on chronic respiratory failure (HCC)      # Acute on chronic respiratory failure with hypoxia  2/2  bilateral pleural effusions, ?malignant effusion: Presented with worsening shortness of breath,  Chest x-ray personally reviewed bilateral pleural effusions L>R  Supplemental O2 to maintain saturation above 88%, thoracentesis done on , pleural fluid analysis with cytology, heme-onc following.  Repeat chest x-ray to monitor.     # JUANCARLOS suspected prerenal: Urinalysis urine electrolytes bladder scan, IV NS infusion due to poor oral intake, resolved     # Metastatic cholangiocarcinoma  Cancer-related pain  CT abdomen and pelvis from 2024 reviewed trace bilateral pleural effusions ill-defined multifocal mass occupying right lobe of the liver, satellite lesions are present, right portal vein occlusion is noted.  Optimize pain regimen  Scheduled to see Dr. Car hence will consult inpatient  Given extent of disease and poor performance status candidate for hospice evaluation     # Chronic thrombocytopenia  Probably related to underlying malignancy  Monitor CBC     # COPD  Continue Symbicort and Spiriva as needed DuoNeb     # Paroxysmal A-fib  Continue home amiodarone    Comment: Please note this report has been produced using speech recognition software and may contain errors related to that system including errors in grammar, punctuation, and spelling, as    Component Value Date/Time    TROPONINT <0.010 09/01/2021 12:53 PM    TROPONINT <0.010 09/02/2019 02:50 AM     Lactic Acid: No results for input(s): \"LACTA\" in the last 72 hours.  BNP:   Recent Labs     08/28/24  2345   PROBNP 804.4*     UA:  Lab Results   Component Value Date/Time    NITRU NEGATIVE 08/31/2024 01:00 AM    COLORU YELLOW 08/31/2024 01:00 AM    PHUR 6.0 08/31/2024 01:00 AM    WBCUA <1 08/31/2024 01:00 AM    RBCUA 1 08/31/2024 01:00 AM    MUCUS FEW 03/01/2022 06:33 PM    TRICHOMONAS NONE SEEN 08/31/2024 01:00 AM    BACTERIA NEGATIVE 08/31/2024 01:00 AM    CLARITYU CLEAR 08/31/2024 01:00 AM    LEUKOCYTESUR NEGATIVE 08/31/2024 01:00 AM    UROBILINOGEN 1.0 08/31/2024 01:00 AM    BILIRUBINUR NEGATIVE 08/31/2024 01:00 AM    BLOODU NEGATIVE 08/31/2024 01:00 AM    GLUCOSEU 100 08/31/2024 01:00 AM    KETUA NEGATIVE 08/31/2024 01:00 AM     Urine Cultures: No results found for: \"LABURIN\"  Blood Cultures: No results found for: \"BC\"  No results found for: \"BLOODCULT2\"  Organism: No results found for: \"ORG\"      Electronically signed by Zack Culver MD on 8/31/2024 at 7:50 AM

## 2024-09-01 LAB
ALBUMIN SERPL-MCNC: 3.3 GM/DL (ref 3.4–5)
ALP BLD-CCNC: 291 IU/L (ref 40–128)
ALT SERPL-CCNC: 22 U/L (ref 10–40)
ANION GAP SERPL CALCULATED.3IONS-SCNC: 12 MMOL/L (ref 7–16)
AST SERPL-CCNC: 22 IU/L (ref 15–37)
BILIRUB SERPL-MCNC: 0.6 MG/DL (ref 0–1)
BUN SERPL-MCNC: 16 MG/DL (ref 6–23)
CALCIUM SERPL-MCNC: 8.4 MG/DL (ref 8.3–10.6)
CHLORIDE BLD-SCNC: 99 MMOL/L (ref 99–110)
CO2: 27 MMOL/L (ref 21–32)
CREAT SERPL-MCNC: 1 MG/DL (ref 0.6–1.1)
CULTURE: ABNORMAL
GFR, ESTIMATED: 56 ML/MIN/1.73M2
GLUCOSE SERPL-MCNC: 97 MG/DL (ref 70–99)
GRAM SMEAR: ABNORMAL
Lab: ABNORMAL
POTASSIUM SERPL-SCNC: 4.3 MMOL/L (ref 3.5–5.1)
SODIUM BLD-SCNC: 138 MMOL/L (ref 135–145)
SPECIMEN: ABNORMAL
TOTAL PROTEIN: 5.4 GM/DL (ref 6.4–8.2)

## 2024-09-01 PROCEDURE — 6370000000 HC RX 637 (ALT 250 FOR IP): Performed by: NURSE PRACTITIONER

## 2024-09-01 PROCEDURE — 94761 N-INVAS EAR/PLS OXIMETRY MLT: CPT

## 2024-09-01 PROCEDURE — 6370000000 HC RX 637 (ALT 250 FOR IP): Performed by: STUDENT IN AN ORGANIZED HEALTH CARE EDUCATION/TRAINING PROGRAM

## 2024-09-01 PROCEDURE — 80053 COMPREHEN METABOLIC PANEL: CPT

## 2024-09-01 PROCEDURE — 94640 AIRWAY INHALATION TREATMENT: CPT

## 2024-09-01 PROCEDURE — 2140000000 HC CCU INTERMEDIATE R&B

## 2024-09-01 PROCEDURE — 36415 COLL VENOUS BLD VENIPUNCTURE: CPT

## 2024-09-01 RX ORDER — LIDOCAINE 4 G/G
1 PATCH TOPICAL DAILY
Status: DISCONTINUED | OUTPATIENT
Start: 2024-09-01 | End: 2024-09-06 | Stop reason: HOSPADM

## 2024-09-01 RX ORDER — POLYETHYLENE GLYCOL 3350 17 G/17G
17 POWDER, FOR SOLUTION ORAL DAILY
Status: DISCONTINUED | OUTPATIENT
Start: 2024-09-01 | End: 2024-09-06 | Stop reason: HOSPADM

## 2024-09-01 RX ADMIN — ACETAMINOPHEN 650 MG: 325 TABLET ORAL at 04:05

## 2024-09-01 RX ADMIN — SENNOSIDES AND DOCUSATE SODIUM 1 TABLET: 50; 8.6 TABLET ORAL at 08:12

## 2024-09-01 RX ADMIN — HYDROXYZINE HYDROCHLORIDE 25 MG: 25 TABLET ORAL at 14:44

## 2024-09-01 RX ADMIN — OXYCODONE HYDROCHLORIDE 2.5 MG: 5 TABLET ORAL at 23:44

## 2024-09-01 RX ADMIN — ASPIRIN 81 MG: 81 TABLET, CHEWABLE ORAL at 08:12

## 2024-09-01 RX ADMIN — OXYCODONE HYDROCHLORIDE 2.5 MG: 5 TABLET ORAL at 05:55

## 2024-09-01 RX ADMIN — POLYETHYLENE GLYCOL (3350) 17 G: 17 POWDER, FOR SOLUTION ORAL at 08:12

## 2024-09-01 RX ADMIN — MAJOR MAGNESIUM CITRATE ORAL SOLUTION - LEMON 296 ML: 1.75 LIQUID ORAL at 04:23

## 2024-09-01 RX ADMIN — ACETAMINOPHEN 650 MG: 325 TABLET ORAL at 21:06

## 2024-09-01 RX ADMIN — LACTULOSE 20 G: 10 SOLUTION ORAL at 08:12

## 2024-09-01 RX ADMIN — BUDESONIDE AND FORMOTEROL FUMARATE DIHYDRATE 2 PUFF: 160; 4.5 AEROSOL RESPIRATORY (INHALATION) at 10:40

## 2024-09-01 RX ADMIN — AMIODARONE HYDROCHLORIDE 200 MG: 200 TABLET ORAL at 08:12

## 2024-09-01 RX ADMIN — AMLODIPINE BESYLATE 10 MG: 10 TABLET ORAL at 08:12

## 2024-09-01 RX ADMIN — TIOTROPIUM BROMIDE INHALATION SPRAY 2 PUFF: 3.12 SPRAY, METERED RESPIRATORY (INHALATION) at 10:40

## 2024-09-01 ASSESSMENT — PAIN SCALES - GENERAL
PAINLEVEL_OUTOF10: 7
PAINLEVEL_OUTOF10: 7
PAINLEVEL_OUTOF10: 0
PAINLEVEL_OUTOF10: 3
PAINLEVEL_OUTOF10: 3
PAINLEVEL_OUTOF10: 0
PAINLEVEL_OUTOF10: 0

## 2024-09-01 ASSESSMENT — PAIN - FUNCTIONAL ASSESSMENT
PAIN_FUNCTIONAL_ASSESSMENT: ACTIVITIES ARE NOT PREVENTED
PAIN_FUNCTIONAL_ASSESSMENT: ACTIVITIES ARE NOT PREVENTED

## 2024-09-01 ASSESSMENT — PAIN DESCRIPTION - DESCRIPTORS
DESCRIPTORS: ACHING
DESCRIPTORS: ACHING

## 2024-09-01 ASSESSMENT — PAIN SCALES - WONG BAKER: WONGBAKER_NUMERICALRESPONSE: NO HURT

## 2024-09-01 ASSESSMENT — PAIN DESCRIPTION - LOCATION
LOCATION: BACK
LOCATION: BACK

## 2024-09-01 NOTE — CARE COORDINATION
Spoke with pt and dgt Estella in pt's room. Plan remains going home with [facility]. They do not want to go to SNF.      NOTIFY [facility] [place] [facility] HOSPICE -[phone] AND FAX THE HOSPICE ORDER TO EVAL & TREAT, H&P, IMAGING, AND AVS TO ATTN: OLGA [phone] WHEN PT IS DISCHARGED

## 2024-09-01 NOTE — PROGRESS NOTES
V2.0    [facility] Progress Note      Name:  Luz Ordoñez /Age/Sex: 1940  (84 y.o. female)   MRN & CSN:  [id number] & [id number] Encounter Date/Time: 2024 7:56 AM EDT   Location:  [facility]A PCP: Kayla Redding PA     Attending:Zack Culver MD       Hospital Day: 5    Assessment and Recommendations   Luz Ordoñez is a 84 y.o. female with pmh of metastatic cholangiocarcinoma, COPD/chronic respiratory failure 2 L NC O2, paroxysmal atrial fibrillation, hypertension, hyperlipidemia, peripheral artery disease  who presents with Acute on chronic respiratory failure (HCC)      # Acute on chronic respiratory failure with hypoxia  2/2  bilateral pleural effusions, ?malignant effusion: Presented with worsening shortness of breath,  Chest x-ray personally reviewed bilateral pleural effusions L>R  Supplemental O2 to maintain saturation above 88%, thoracentesis done on , pleural fluid analysis with cytology, heme-onc following.  Repeat chest x-ray slightly worsening moderate left pleural effusion.  Continue to monitor     # JUANCARLOS suspected prerenal: Urinalysis urine electrolytes bladder scan, IV NS infusion due to poor oral intake, resolved     # Metastatic cholangiocarcinoma  Cancer-related pain  CT abdomen and pelvis from 2024 reviewed trace bilateral pleural effusions ill-defined multifocal mass occupying right lobe of the liver, satellite lesions are present, right portal vein occlusion is noted.  Optimize pain regimen  Scheduled to see Dr. Car hence will consult inpatient  Given extent of disease and poor performance status candidate for hospice evaluation     # Chronic thrombocytopenia  Probably related to underlying malignancy  Monitor CBC     # COPD  Continue Symbicort and Spiriva as needed DuoNeb     # Paroxysmal A-fib  Continue home amiodarone    Comment: Please note this report has been produced using speech recognition software and may contain errors related to that system

## 2024-09-02 PROCEDURE — 6370000000 HC RX 637 (ALT 250 FOR IP): Performed by: NURSE PRACTITIONER

## 2024-09-02 PROCEDURE — 94640 AIRWAY INHALATION TREATMENT: CPT

## 2024-09-02 PROCEDURE — 6370000000 HC RX 637 (ALT 250 FOR IP): Performed by: STUDENT IN AN ORGANIZED HEALTH CARE EDUCATION/TRAINING PROGRAM

## 2024-09-02 PROCEDURE — 6360000002 HC RX W HCPCS: Performed by: STUDENT IN AN ORGANIZED HEALTH CARE EDUCATION/TRAINING PROGRAM

## 2024-09-02 PROCEDURE — 99232 SBSQ HOSP IP/OBS MODERATE 35: CPT | Performed by: INTERNAL MEDICINE

## 2024-09-02 PROCEDURE — 2140000000 HC CCU INTERMEDIATE R&B

## 2024-09-02 RX ORDER — AMLODIPINE BESYLATE 5 MG/1
5 TABLET ORAL DAILY
Status: DISCONTINUED | OUTPATIENT
Start: 2024-09-03 | End: 2024-09-06 | Stop reason: HOSPADM

## 2024-09-02 RX ADMIN — ASPIRIN 81 MG: 81 TABLET, CHEWABLE ORAL at 10:34

## 2024-09-02 RX ADMIN — LACTULOSE 20 G: 10 SOLUTION ORAL at 20:51

## 2024-09-02 RX ADMIN — LACTULOSE 20 G: 10 SOLUTION ORAL at 15:50

## 2024-09-02 RX ADMIN — OXYCODONE HYDROCHLORIDE 2.5 MG: 5 TABLET ORAL at 22:31

## 2024-09-02 RX ADMIN — SENNOSIDES AND DOCUSATE SODIUM 1 TABLET: 50; 8.6 TABLET ORAL at 10:34

## 2024-09-02 RX ADMIN — OXYCODONE HYDROCHLORIDE 2.5 MG: 5 TABLET ORAL at 06:11

## 2024-09-02 RX ADMIN — POLYETHYLENE GLYCOL (3350) 17 G: 17 POWDER, FOR SOLUTION ORAL at 10:33

## 2024-09-02 RX ADMIN — HYDROXYZINE HYDROCHLORIDE 25 MG: 25 TABLET ORAL at 20:51

## 2024-09-02 RX ADMIN — AMIODARONE HYDROCHLORIDE 200 MG: 200 TABLET ORAL at 10:34

## 2024-09-02 RX ADMIN — LACTULOSE 20 G: 10 SOLUTION ORAL at 10:33

## 2024-09-02 RX ADMIN — IPRATROPIUM BROMIDE AND ALBUTEROL SULFATE 1 DOSE: 2.5; .5 SOLUTION RESPIRATORY (INHALATION) at 11:00

## 2024-09-02 RX ADMIN — HEPARIN SODIUM 5000 UNITS: 5000 INJECTION INTRAVENOUS; SUBCUTANEOUS at 15:50

## 2024-09-02 ASSESSMENT — PAIN DESCRIPTION - DESCRIPTORS: DESCRIPTORS: ACHING

## 2024-09-02 ASSESSMENT — PAIN SCALES - GENERAL
PAINLEVEL_OUTOF10: 0
PAINLEVEL_OUTOF10: 7
PAINLEVEL_OUTOF10: 0
PAINLEVEL_OUTOF10: 6
PAINLEVEL_OUTOF10: 0

## 2024-09-02 ASSESSMENT — PAIN - FUNCTIONAL ASSESSMENT: PAIN_FUNCTIONAL_ASSESSMENT: ACTIVITIES ARE NOT PREVENTED

## 2024-09-02 ASSESSMENT — PAIN DESCRIPTION - LOCATION: LOCATION: SHOULDER

## 2024-09-02 NOTE — PLAN OF CARE
Problem: Discharge Planning  Goal: Discharge to home or other facility with appropriate resources  Outcome: Progressing  Flowsheets (Taken 9/1/2024 2006)  Discharge to home or other facility with appropriate resources:   Identify barriers to discharge with patient and caregiver   Arrange for needed discharge resources and transportation as appropriate   Identify discharge learning needs (meds, wound care, etc)     Problem: Safety - Adult  Goal: Free from fall injury  Outcome: Progressing     Problem: Pain  Goal: Verbalizes/displays adequate comfort level or baseline comfort level  Outcome: Progressing  Flowsheets (Taken 9/1/2024 2006)  Verbalizes/displays adequate comfort level or baseline comfort level:   Encourage patient to monitor pain and request assistance   Administer analgesics based on type and severity of pain and evaluate response   Assess pain using appropriate pain scale   Implement non-pharmacological measures as appropriate and evaluate response   Consider cultural and social influences on pain and pain management   Notify Licensed Independent Practitioner if interventions unsuccessful or patient reports new pain     Problem: Skin/Tissue Integrity  Goal: Absence of new skin breakdown  Description: 1.  Monitor for areas of redness and/or skin breakdown  2.  Assess vascular access sites hourly  3.  Every 4-6 hours minimum:  Change oxygen saturation probe site  4.  Every 4-6 hours:  If on nasal continuous positive airway pressure, respiratory therapy assess nares and determine need for appliance change or resting period.  Outcome: Progressing

## 2024-09-02 NOTE — PROGRESS NOTES
Hematology Oncology Inpatient Progress Note    Patient Name:  Luz Ordoñez  Patient :  1940  Patient MRN:  [id number]  Room: [address] [place], [place] [address]A   Admitted: 2024 10:44 PM   Hospital Attending Provider: Zack Culver MD    PCP:  Kayla Redding PA     Date of Service: 24       Reason for Consult: cholangiocarcinoma     Chief Complaint:    Chief Complaint   Patient presents with    Shortness of Breath    Cough     Pt having Shortness of Breath, cough is on hospice. Pt called them said to come to ER. Pt has antibiotics called in unable to get them.     Principal Problem:    Acute on chronic respiratory failure (HCC)  Active Problems:    Pleural effusion  Resolved Problems:    * No resolved hospital problems. *      HPI:   Luz Ordoñez is a 84 y.o. female with a history of COPD on 2L oxygen at baseline who presented to [facility] complaining of SOB. She was diagnosed with  stage IV intrahepatic cholangiocarcinoma in May 2024 with diffuse osseous metastatic disease at time of diagnosis. Received palliative radiation therapy to the right liver mass 3750 cGy in 15 fractions completed 2024. Dr Franco at [facility] discussed systemic treatment with her and family and her concerns about tolerance given poor performance status and recommended hospice evaluation. It does not appear any NGS was requested. She enrolled in hospice and has since moved to [place] to live with her daughter. She had worsening shortness of breath that came on within a few days and hospice nurse was unable to provide adequate symptom relief and so presented to the ED.  She was found to have large L pleural effusion, small R pleural effusion. She is planned to have thoracentesis today, fluid studies and cytology are ordered. She is requiring 6L oxygen currently.      On exam she is alert and oriented, in no acute distress. States she is SOB worse with any exertion or taking a deep breath. She has  291    Past Medical History:   Diagnosis Date    COPD (chronic obstructive pulmonary disease) (HCC)        History reviewed. No pertinent surgical history.                                                                             Social History     Socioeconomic History    Marital status:      Spouse name: Not on file    Number of children: Not on file    Years of education: Not on file    Highest education level: Not on file   Occupational History    Not on file   Tobacco Use    Smoking status: Some Days     Current packs/day: 0.50     Average packs/day: 0.5 packs/day for 40.0 years (20.0 ttl pk-yrs)     Types: Cigarettes     Start date: 8/30/1984    Smokeless tobacco: Never   Substance and Sexual Activity    Alcohol use: Never    Drug use: Never    Sexual activity: Not on file   Other Topics Concern    Not on file   Social History Narrative    Not on file     Social Determinants of Health     Financial Resource Strain: Low Risk  (5/10/2024)    Received from [facility]    Financial Resource Strain     : 0   Food Insecurity: No Food Insecurity (8/29/2024)    Hunger Vital Sign     Worried About Running Out of Food in the Last Year: Never true     Ran Out of Food in the Last Year: Never true   Transportation Needs: No Transportation Needs (8/29/2024)    PRAPARE - Transportation     Lack of Transportation (Medical): No     Lack of Transportation (Non-Medical): No   Physical Activity: Inactive (5/22/2024)    Received from [facility]    Physical Activity     Number of minutes of exercise per week : 0   Stress: No Stress Concern Present (5/10/2024)    Received from [facility]    Stress     : 1   Social Connections: Low Risk  (5/10/2024)    Received from [facility]    Family and Community Support     : 0     : 0   Intimate Partner Violence: Unknown (10/10/2023)    [facility]    Abuse Screen

## 2024-09-02 NOTE — CARE COORDINATION
Chart reviewed. CM was updated by doctor that the patient has changed her mind and wants to go to a facility.     CM met with the patient and family at bedside. The patient says she does not want to go home. She does not want to die at home and she says she has be come dependent in all care and she does not want her daughters to have to do all the care. Her daughters do not think they can take care of the patient if she is dependent.     CM talked about different options for the patient. She wants to apply for medicaid and go to a medicaid pending facility with hospice. Cm will need to call the public health benefits department in the morning to assist.    The patient does not have a preference on which facility she goes to. She said as long as they take medicaid pending. She said she gets social security (around $1,600 a month)but she does not have any other money other then a car. She says that she does not care if the facility takes her money.    Cm will follow.

## 2024-09-02 NOTE — PROGRESS NOTES
V2.0    [facility] [place]      Name:  Luz Ordoñez /Age/Sex: 1940  (84 y.o. female)   MRN & CSN:  [id number] & [id number] Encounter Date/Time: 2024 7:56 AM EDT   Location:  [facility]-A PCP: Kayla Redding PA     Attending:Zack Culver MD       Hospital Day: 6    Assessment and Recommendations   Luz Ordoñez is a 84 y.o. female with pmh of metastatic cholangiocarcinoma, COPD/chronic respiratory failure 2 L NC O2, paroxysmal atrial fibrillation, hypertension, hyperlipidemia, peripheral artery disease  who presents with Acute on chronic respiratory failure (HCC)      # Acute on chronic respiratory failure with hypoxia  2/2  bilateral pleural effusions, ?malignant effusion: Presented with worsening shortness of breath,  Chest x-ray personally reviewed bilateral pleural effusions L>R  Supplemental O2 to maintain saturation above 88%, thoracentesis done on , pleural fluid analysis with cytology, heme-onc following.  Repeat chest x-ray slightly worsening moderate left pleural effusion.  If worsens to obtain thoracentesis, or Pleurx catheter continue to monitor     # JUANCARLOS suspected prerenal: Urinalysis urine electrolytes bladder scan, IV NS infusion due to poor oral intake, resolved     # Metastatic cholangiocarcinoma  Cancer-related pain  CT abdomen and pelvis from 2024 reviewed trace bilateral pleural effusions ill-defined multifocal mass occupying right lobe of the liver, satellite lesions are present, right portal vein occlusion is noted.  Optimize pain regimen  Scheduled to see Dr. Car hence will consult inpatient  Given extent of disease and poor performance status candidate for hospice evaluation     # Chronic thrombocytopenia  Probably related to underlying malignancy  Monitor CBC     # COPD  Continue Symbicort and Spiriva as needed DuoNeb     # Paroxysmal A-fib  Continue home amiodarone    Comment: Please note this report has been produced using speech recognition      Hemoglobin A1C:   Lab Results   Component Value Date/Time    LABA1C 5.9 12/23/2023 04:57 PM     TSH: No results found for: \"TSH\"  Troponin:   Lab Results   Component Value Date/Time    TROPONINT <0.010 09/01/2021 12:53 PM    TROPONINT <0.010 09/02/2019 02:50 AM     Lactic Acid: No results for input(s): \"LACTA\" in the last 72 hours.  BNP:   No results for input(s): \"PROBNP\" in the last 72 hours.    UA:  Lab Results   Component Value Date/Time    NITRU NEGATIVE 08/31/2024 01:00 AM    COLORU YELLOW 08/31/2024 01:00 AM    PHUR 6.0 08/31/2024 01:00 AM    WBCUA <1 08/31/2024 01:00 AM    RBCUA 1 08/31/2024 01:00 AM    MUCUS FEW 03/01/2022 06:33 PM    TRICHOMONAS NONE SEEN 08/31/2024 01:00 AM    BACTERIA NEGATIVE 08/31/2024 01:00 AM    CLARITYU CLEAR 08/31/2024 01:00 AM    LEUKOCYTESUR NEGATIVE 08/31/2024 01:00 AM    UROBILINOGEN 1.0 08/31/2024 01:00 AM    BILIRUBINUR NEGATIVE 08/31/2024 01:00 AM    BLOODU NEGATIVE 08/31/2024 01:00 AM    GLUCOSEU 100 08/31/2024 01:00 AM    KETUA NEGATIVE 08/31/2024 01:00 AM     Urine Cultures: No results found for: \"LABURIN\"  Blood Cultures: No results found for: \"BC\"  No results found for: \"BLOODCULT2\"  Organism: No results found for: \"ORG\"      Electronically signed by Zack Culver MD on 9/2/2024 at 8:03 AM

## 2024-09-03 ENCOUNTER — APPOINTMENT (OUTPATIENT)
Dept: GENERAL RADIOLOGY | Age: 84
End: 2024-09-03
Payer: MEDICARE

## 2024-09-03 ENCOUNTER — APPOINTMENT (OUTPATIENT)
Dept: CT IMAGING | Age: 84
End: 2024-09-03
Payer: MEDICARE

## 2024-09-03 ENCOUNTER — APPOINTMENT (OUTPATIENT)
Dept: INTERVENTIONAL RADIOLOGY/VASCULAR | Age: 84
End: 2024-09-03
Payer: MEDICARE

## 2024-09-03 LAB
FLUID TYPE: ABNORMAL INDEX
GLUCOSE, FLUID: 121 MG/DL
LACTATE DEHYDROGENASE, FLUID: 231 IU/L
LYMPHOCYTES, BODY FLUID: 39 %
MESOTHELIAL FLUID: 3 /100 WBC
MONOCYTE, FLUID: ABNORMAL %
NEUTROPHIL, FLUID: 26 %
OTHER CELLS FLUID: ABNORMAL
PROTEIN FLUID: 3.2 G/DL
RBC FLUID: ABNORMAL /CU MM
WBC FLUID: 412 /CU MM

## 2024-09-03 PROCEDURE — 83615 LACTATE (LD) (LDH) ENZYME: CPT

## 2024-09-03 PROCEDURE — 6370000000 HC RX 637 (ALT 250 FOR IP): Performed by: NURSE PRACTITIONER

## 2024-09-03 PROCEDURE — 97530 THERAPEUTIC ACTIVITIES: CPT

## 2024-09-03 PROCEDURE — 94761 N-INVAS EAR/PLS OXIMETRY MLT: CPT

## 2024-09-03 PROCEDURE — 88305 TISSUE EXAM BY PATHOLOGIST: CPT | Performed by: PATHOLOGY

## 2024-09-03 PROCEDURE — 88108 CYTOPATH CONCENTRATE TECH: CPT | Performed by: PATHOLOGY

## 2024-09-03 PROCEDURE — 71045 X-RAY EXAM CHEST 1 VIEW: CPT

## 2024-09-03 PROCEDURE — 2709999900 CT THORACENTESIS W TUBE

## 2024-09-03 PROCEDURE — 84157 ASSAY OF PROTEIN OTHER: CPT

## 2024-09-03 PROCEDURE — 0W9B30Z DRAINAGE OF LEFT PLEURAL CAVITY WITH DRAINAGE DEVICE, PERCUTANEOUS APPROACH: ICD-10-PCS | Performed by: RADIOLOGY

## 2024-09-03 PROCEDURE — 6370000000 HC RX 637 (ALT 250 FOR IP): Performed by: STUDENT IN AN ORGANIZED HEALTH CARE EDUCATION/TRAINING PROGRAM

## 2024-09-03 PROCEDURE — 89051 BODY FLUID CELL COUNT: CPT

## 2024-09-03 PROCEDURE — 2140000000 HC CCU INTERMEDIATE R&B

## 2024-09-03 PROCEDURE — 2700000000 HC OXYGEN THERAPY PER DAY

## 2024-09-03 PROCEDURE — 2500000003 HC RX 250 WO HCPCS: Performed by: RADIOLOGY

## 2024-09-03 PROCEDURE — 82945 GLUCOSE OTHER FLUID: CPT

## 2024-09-03 PROCEDURE — 94640 AIRWAY INHALATION TREATMENT: CPT

## 2024-09-03 PROCEDURE — 99231 SBSQ HOSP IP/OBS SF/LOW 25: CPT | Performed by: PHYSICIAN ASSISTANT

## 2024-09-03 PROCEDURE — 97116 GAIT TRAINING THERAPY: CPT

## 2024-09-03 RX ORDER — LIDOCAINE HYDROCHLORIDE 10 MG/ML
INJECTION, SOLUTION EPIDURAL; INFILTRATION; INTRACAUDAL; PERINEURAL PRN
Status: COMPLETED | OUTPATIENT
Start: 2024-09-03 | End: 2024-09-03

## 2024-09-03 RX ADMIN — LIDOCAINE HYDROCHLORIDE 13 ML: 10 INJECTION, SOLUTION EPIDURAL; INFILTRATION; INTRACAUDAL; PERINEURAL at 13:12

## 2024-09-03 RX ADMIN — CALCIUM CARBONATE 500 MG: 500 TABLET, CHEWABLE ORAL at 09:00

## 2024-09-03 RX ADMIN — OXYCODONE HYDROCHLORIDE 2.5 MG: 5 TABLET ORAL at 18:24

## 2024-09-03 RX ADMIN — ACETAMINOPHEN 650 MG: 325 TABLET ORAL at 15:17

## 2024-09-03 RX ADMIN — AMIODARONE HYDROCHLORIDE 200 MG: 200 TABLET ORAL at 09:00

## 2024-09-03 ASSESSMENT — PAIN SCALES - WONG BAKER
WONGBAKER_NUMERICALRESPONSE: NO HURT

## 2024-09-03 ASSESSMENT — PAIN DESCRIPTION - PAIN TYPE: TYPE: ACUTE PAIN

## 2024-09-03 ASSESSMENT — PAIN DESCRIPTION - ORIENTATION
ORIENTATION: LEFT
ORIENTATION: MID
ORIENTATION: LEFT

## 2024-09-03 ASSESSMENT — PAIN SCALES - GENERAL
PAINLEVEL_OUTOF10: 0
PAINLEVEL_OUTOF10: 0
PAINLEVEL_OUTOF10: 2
PAINLEVEL_OUTOF10: 7
PAINLEVEL_OUTOF10: 3
PAINLEVEL_OUTOF10: 3

## 2024-09-03 ASSESSMENT — PAIN - FUNCTIONAL ASSESSMENT
PAIN_FUNCTIONAL_ASSESSMENT: ACTIVITIES ARE NOT PREVENTED

## 2024-09-03 ASSESSMENT — PAIN DESCRIPTION - LOCATION
LOCATION: CHEST
LOCATION: CHEST
LOCATION: ABDOMEN

## 2024-09-03 ASSESSMENT — PAIN DESCRIPTION - DESCRIPTORS
DESCRIPTORS: ACHING;THROBBING
DESCRIPTORS: ACHING
DESCRIPTORS: ACHING

## 2024-09-03 NOTE — PROGRESS NOTES
09/03/24 1713   Encounter Summary   Encounter Overview/Reason Initial Encounter   Service Provided For Patient   Referral/Consult From [facility]   [facility]   Last Encounter  09/03/24  (patient did not express any needs/concerns besides prayer support. Patient's daughter Estella was present in the room at the time of visit. Patient appreciated the visit.)   Complexity of Encounter Low   Begin Time 1645   End Time  1700   Total Time Calculated 15 min   Spiritual/Emotional needs   Type Spiritual Support   Grief, Loss, and Adjustments   Type Adjustment to illness   Assessment/Intervention/Outcome   Assessment Coping;Powerlessness   Intervention Active listening;Empowerment;Nurtured Hope;Sustaining Presence/Ministry of presence   Outcome Encouraged;Expressed Gratitude;Receptive   Plan and Referrals   Plan/Referrals Continue Support (comment)

## 2024-09-03 NOTE — PLAN OF CARE
Problem: Discharge Planning  Goal: Discharge to home or other facility with appropriate resources  9/3/2024 0906 by Marly Ledbetter RN  Outcome: Progressing  9/2/2024 2124 by Elma Brizuela RN  Outcome: Progressing     Problem: Safety - Adult  Goal: Free from fall injury  9/3/2024 0906 by Marly Ledbetter RN  Outcome: Progressing  9/2/2024 2124 by Elma Brizuela RN  Outcome: Progressing     Problem: Pain  Goal: Verbalizes/displays adequate comfort level or baseline comfort level  9/3/2024 0906 by Marly Ledbetter RN  Outcome: Progressing  9/2/2024 2124 by Elma Brizuela RN  Outcome: Progressing     Problem: Skin/Tissue Integrity  Goal: Absence of new skin breakdown  Description: 1.  Monitor for areas of redness and/or skin breakdown  2.  Assess vascular access sites hourly  3.  Every 4-6 hours minimum:  Change oxygen saturation probe site  4.  Every 4-6 hours:  If on nasal continuous positive airway pressure, respiratory therapy assess nares and determine need for appliance change or resting period.  9/3/2024 0906 by Marly Ledbetter, RN  Outcome: Progressing  9/2/2024 2124 by Elma Brizuela, RN  Outcome: Progressing

## 2024-09-03 NOTE — PROGRESS NOTES
Hematology Oncology Inpatient Progress Note    Patient Name:  Luz Ordoñez  Patient :  1940  Patient MRN:  [id number]  Room: [address] [place], [place] [address]A   Admitted: 2024 10:44 PM   Hospital Attending Provider: Zack Culver MD    PCP:  Kayla Redding PA     Date of Service: 24       Reason for Consult: cholangiocarcinoma     Chief Complaint:    Chief Complaint   Patient presents with    Shortness of Breath    Cough     Pt having Shortness of Breath, cough is on hospice. Pt called them said to come to ER. Pt has antibiotics called in unable to get them.     Principal Problem:    Acute on chronic respiratory failure (HCC)  Active Problems:    Pleural effusion  Resolved Problems:    * No resolved hospital problems. *      HPI:   Luz Ordoñez is a 84 y.o. female with a history of COPD on 2L oxygen at baseline who presented to [facility] complaining of SOB. She was diagnosed with  stage IV intrahepatic cholangiocarcinoma in May 2024 with diffuse osseous metastatic disease at time of diagnosis. Received palliative radiation therapy to the right liver mass 3750 cGy in 15 fractions completed 2024. Dr Franco at [facility] discussed systemic treatment with her and family and her concerns about tolerance given poor performance status and recommended hospice evaluation. It does not appear any NGS was requested. She enrolled in hospice and has since moved to [place] to live with her daughter. She had worsening shortness of breath that came on within a few days and hospice nurse was unable to provide adequate symptom relief and so presented to the ED.  She was found to have large L pleural effusion, small R pleural effusion. She is planned to have thoracentesis today, fluid studies and cytology are ordered. She is requiring 6L oxygen currently.      On exam she is alert and oriented, in no acute distress. States she is SOB worse with any exertion or taking a deep breath. She has

## 2024-09-03 NOTE — DISCHARGE INSTR - COC
Continuity of Care Form    Patient Name: Luz Ordoñez   :  1940  MRN:  [id number]    Admit date:  2024  Discharge date:  ***    Code Status Order: Full Code   Advance Directives:   Advance Care Flowsheet Documentation             Admitting Physician:  No admitting provider for patient encounter.  PCP: Kayla Redding PA    Discharging Nurse: ***  Discharging Hospital Unit/Room#: 3112/3112-A  Discharging Unit Phone Number: ***    Emergency Contact:   Extended Emergency Contact Information  Primary Emergency Contact: Estella Simmons  Home Phone: [phone]  Mobile Phone: [phone]  Relation: Child    Past Surgical History:  History reviewed. No pertinent surgical history.    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    Acute on chronic respiratory failure ([facility]) J96.20    Pleural effusion J90       Isolation/Infection:   Isolation            No Isolation          Patient Infection Status       None to display                     Nurse Assessment:  Last Vital Signs: BP (!) 125/50   Pulse 91   Temp 97.9 °F (36.6 °C) (Oral)   Resp 25   Ht 1.575 m (5' 2\")   Wt 74 kg (163 lb 2.3 oz)   SpO2 96%   BMI 29.84 kg/m²     Last documented pain score (0-10 scale): Pain Level: 0  Last Weight:   Wt Readings from Last 1 Encounters:   24 74 kg (163 lb 2.3 oz)     Mental Status:  {IP PT MENTAL STATUS:}    IV Access:  {[facility] IV ACCESS:712804710}    Nursing Mobility/ADLs:  Walking   {[facility] ADLs:084317785}  Transfer  {[facility] DME  ADLs:133703464}  Bathing  {[facility] DME ADLs:517449290}  Dressing  {[facility] DME ADLs:834770933}  Toileting  {[facility] DME ADLs:839413913}  Feeding  {[facility] DME ADLs:632833022}  Med Admin  {[facility] DME ADLs:753575608}  Med Delivery   { [facility] MED Delivery:928971568}    Wound Care Documentation and Therapy:  Wound 08/29/24 Buttocks Red, blanchable (Active)   Dressing Status Clean;Dry;Intact 09/03/24 0849   Wound Cleansed Soap and water 09/02/24 1030   Dressing/Treatment Moisture barrier 09/03/24 0849   Dressing Change Due 09/02/24 09/01/24 1540   Wound Assessment Pink/red 09/02/24 1600   Drainage Amount None (dry) 09/02/24 1600   Odor None 09/02/24 1600   Phoebe-wound Assessment Blanchable erythema 09/02/24 1600   Number of days: 5        Elimination:  Continence:   Bowel: {YES / NO:19727}  Bladder: {YES / NO:19727}  Urinary Catheter: {Urinary Catheter:149816708}   Colostomy/Ileostomy/Ileal Conduit: {YES / NO:19727}       Date of Last BM: ***    Intake/Output Summary (Last 24 hours) at 9/3/2024 1354  Last data filed at 9/3/2024 0550  Gross per 24 hour   Intake --   Output 600 ml   Net -600 ml     I/O last 3 completed shifts:  In: 360 [P.O.:360]  Out: 600 [Urine:600]    Safety Concerns:     { GUTIERREZ Safety Concerns:070549446}    Impairments/Disabilities:      { GUTIERREZ Impairments/Disabilities:477615848}      Patient's personal belongings (please select all that are sent with patient):  {[facility] DME Belongings:374599745}    RN SIGNATURE:  {Esignature:304696430}    CASE MANAGEMENT/SOCIAL WORK SECTION    Inpatient Status Date: ***    Readmission Risk Assessment Score:  Readmission Risk              Risk of Unplanned Readmission:  12           Discharging to Facility/ Agency   Name: [facility]  Address:  Phone: [phone]  Fax:    Dialysis Facility (if applicable)   Name:  Address:  Dialysis Schedule:  Phone:  Fax:      PHYSICIAN SECTION    Nutrition Therapy:  Current Nutrition Therapy:   { GUTIERREZ Diet List:967609735}    Routes of Feeding: {[facility] DME

## 2024-09-03 NOTE — PROCEDURES
PROCEDURE NOTE  Date: 9/3/2024   Name: Luz Ordoñez  YOB: 1940    Procedures Pleur Ex  catheter placement performed into the left pleural space and secured in place under CT guidance. Total of 800 cc of bloody fluid removed and sent for cytology.   No complications.  Post procedure imaging showed complete removal of left pleural effusion.

## 2024-09-03 NOTE — H&P
Consult Interventional Radiology    Date:9/3/2024  Name:Luz Ordoñez   :1940   MR#:[id number]    SEX:female     Planned procedure:  pleur ex catheter  Indication: left effusion    H&P Status: H&P was reviewed, the patient was examined and no change has occurred in patient's condition since H&P was completed.    Past Medical History:  Past Medical History:   Diagnosis Date    COPD (chronic obstructive pulmonary disease) (HCC)         Past Surgical History:  History reviewed. No pertinent surgical history.    Social History:  Social History     Socioeconomic History    Marital status:      Spouse name: Not on file    Number of children: Not on file    Years of education: Not on file    Highest education level: Not on file   Occupational History    Not on file   Tobacco Use    Smoking status: Some Days     Current packs/day: 0.50     Average packs/day: 0.5 packs/day for 40.0 years (20.0 ttl pk-yrs)     Types: Cigarettes     Start date: 1984    Smokeless tobacco: Never   Substance and Sexual Activity    Alcohol use: Never    Drug use: Never    Sexual activity: Not on file   Other Topics Concern    Not on file   Social History Narrative    Not on file     Social Determinants of Health     Financial Resource Strain: Low Risk  (5/10/2024)    Received from [facility]    Financial Resource Strain     : 0   Food Insecurity: No Food Insecurity (2024)    Hunger Vital Sign     Worried About Running Out of Food in the Last Year: Never true     Ran Out of Food in the Last Year: Never true   Transportation Needs: No Transportation Needs (2024)    PRAPARE - Transportation     Lack of Transportation (Medical): No     Lack of Transportation (Non-Medical): No   Physical Activity: Inactive (2024)    Received from [facility]    Physical Activity     Number of minutes of exercise per week : 0   Stress: No Stress Concern Present (5/10/2024)    Received from  [facility]    Stress     : 1   Social Connections: Low Risk  (5/10/2024)    Received from [facility]    Family and Community Support     : 0     : 0   Intimate Partner Violence: Unknown (10/10/2023)    [facility]    Abuse Screen     Unsafe at Home or Work/School: Not on file     Feels Threatened by Someone?: Not on file     Does Anyone Keep You from Contacting Others or Doint Things Outside the Home?: Not on file     Physical Sign of Abuse Present: Not on file   Housing Stability: Low Risk  (8/29/2024)    Housing Stability Vital Sign     Unable to Pay for Housing in the Last Year: No     Number of Times Moved in the Last Year: 1     Homeless in the Last Year: No       Family History:  History reviewed. No pertinent family history.    Allergies:  No Known Allergies    Medications:  No current facility-administered medications on file prior to encounter.     Current Outpatient Medications on File Prior to Encounter   Medication Sig Dispense Refill    acetaminophen (TYLENOL) 500 MG tablet Take 1 tablet by mouth every 6 hours as needed for Pain      lidocaine 4 % external patch Place 1 patch onto the skin daily      meclizine (ANTIVERT) 25 MG CHEW Take 1 tablet by mouth 3 times daily as needed      amiodarone (CORDARONE) 200 MG tablet Take 1 tablet by mouth daily      ondansetron (ZOFRAN) 4 MG tablet Take 2 tablets by mouth every 8 hours as needed for Nausea or Vomiting      sennosides-docusate sodium (SENOKOT-S) 8.6-50 MG tablet Take 1 tablet by mouth daily      lactulose (CHRONULAC) 10 GM/15ML solution Take 30 mLs by mouth 3 times daily      traMADol (ULTRAM) 50 MG tablet Take 1 tablet by mouth every 6 hours as needed for Pain.      atorvastatin (LIPITOR) 40 MG tablet Take 1 tablet by mouth nightly 30 tablet 0    aspirin 81 MG EC tablet Take 1 tablet by mouth daily      fluticasone-umeclidin-vilant (TRELEGY ELLIPTA) 100-62.5-25

## 2024-09-03 NOTE — OR NURSING
15.5 Maltese Pleur X catheter placed left side.  850cc of sanguinous fluid drained and sent to lab.    Please phone in prescription to pharmacy. Thanks.

## 2024-09-03 NOTE — PROGRESS NOTES
Physical Therapy Treatment Note  Name: Luz Ordoñez MRN: [id number] :   1940   Date:  9/3/2024   Admission Date: 2024 Room:  [address] [place] [address]   Restrictions/Precautions:  General precautions, falls   Communication with other providers:  Pt okay to see for therapy per RN   Subjective:  Patient states:  \"I'm worried my lungs are just filling up with fluid again\"   Pain:   Location, Type, Intensity (0/10 to 10/10):  Endorses 8/10 abdominal pain at rest, 10/10 abdominal pain with movement.   Objective:    Observation:  Pt supine in bed upon therapy arrival.   Objective Measures:  Tele, O2 94% upon PTA arrival, 97% sitting up in recliner.   Treatment, including education/measures:    Therapeutic Activity Training:   Therapeutic activity training was instructed today.  Cues were given for safety, sequence, UE/LE placement, awareness, and balance. Activities performed today included bed mobility training, sup-sit, sit-stand, SPT.    Mobility:  Sup > sit: Min A at trunk to right, pt is able to fully advance LEs to EOB.   Scooting: CGA  STS: CGA from EOB x 2 reps with cues for hand placement. Extended rest break in between STS for recovery.   SPT: CGA from EOB > recliner with RW. Cues for hand placement and AD management.     Safety:   Pt returned safely to recliner with chair alarm activated, call light in reach, all needs met.   Assessment / Impression:    Pt tolerated OOB activities well this date but does present with significant endurance deficits, limiting therapy progression.   Patient's tolerance of treatment:  Good   Adverse Reaction: none  Significant change in status and impact:  none  Barriers to improvement:  Endurance   Plan for Next Session:    Plan to continue OOB activities.  Time in:  1028  Time out:  1053  Timed treatment minutes:  25  Total treatment time:  25    Previously filed items:  Social/Functional History  Lives With: Daughter  Type of Home: Mobile home  Home Layout: One level  Home  Access: Stairs to enter with rails  Entrance Stairs - Number of Steps: 4  Bathroom Shower/Tub: Tub/Shower unit  Bathroom Toilet: Handicap height  Bathroom Equipment: Shower chair  Bathroom Accessibility: Accessible  Home Equipment: Oxygen, Hospital bed, Rollator (O2 CONCENTRATOR)  Receives Help From: Family  ADL Assistance: Needs assistance (dtr helps with bathing and dressing)  Ambulation Assistance: Independent (Netta with rollator normally but has needed some help lately with feeling weak)  Transfer Assistance: Independent (indep typically but has needed some help lately with feeling weak)  Active : No  Patient's  Info: dtr  Mode of Transportation: Car        Short Term Goals  Time Frame for Short Term Goals: 2 weeks  Short Term Goal 1: Pt will perform sit><supine SBA  Short Term Goal 2: Pt will transfer to all surfaces SBA  Short Term Goal 3: Pt will ambulate 100ft with RW SBA  Short Term Goal 4: Pt will ascend/descend 4 steps, 1-2 rails CGA  Short Term Goal 5: Pt will perform standing light dynamic activity x 2 mintues, single UE support if needed SBA    Electronically signed by:    Kasia Sarah, PTA  9/3/2024, 11:29 AM

## 2024-09-03 NOTE — PROGRESS NOTES
Spoke to Dr Car regarding pt code status change and to verify if she still wants the pleurex catheter placed. Dr Black spoke to Dr Car post ct scan and is placing pleurex catheter.

## 2024-09-03 NOTE — CARE COORDINATION
Noted that pt wants to go to a SNF for LTC on Medicaid pending.  Pt does not have a SNF preference.  Pt is having a Pleurx cath placed today.  Noted that pt has a [place] address.  CM will attempt to find a SNF that can accommodate her pleurx drainage and that has a Medicaid pending bed.  [facility] and [facility] do not have any Medicaid beds.   left for [facility], [facility] and [facility] to notify this CM if they have any Medicaid beds or can take care of a pleurx drainage system. [facility] states that they do have Medicaid beds available and are able to accept pt with the Pleurx Cath.  He states that pt would have to go to skilled first and then they could transition her into LTC once they start the Medicaid application.  Pt will not be able to have Hospice until she has Medicaid. This CM updated daughter Estella and Lucinda at bedside (Lucinda was on speaker phone) pt was out of room at this time.  Daughters are agreeable for referral to be made to [facility].  CM will make referral.     TE    [facility] does not have any LTC beds per Brian.  TE

## 2024-09-03 NOTE — CARE COORDINATION
Referral made to [facility] for skilled with plan start Medicaid process and transition to LTC. He will notify CM of decision to accept or not.  TE

## 2024-09-03 NOTE — PLAN OF CARE
Problem: Discharge Planning  Goal: Discharge to home or other facility with appropriate resources  9/3/2024 0906 by Marly Ledbetter RN  Outcome: Progressing  9/3/2024 0906 by Marly Ledbetter RN  Outcome: Progressing  9/2/2024 2124 by Elma Brizuela RN  Outcome: Progressing     Problem: Safety - Adult  Goal: Free from fall injury  9/3/2024 0906 by Marly Ledbetter RN  Outcome: Progressing  9/3/2024 0906 by Marly Ledbetter RN  Outcome: Progressing  9/2/2024 2124 by Elma Brizuela RN  Outcome: Progressing     Problem: Pain  Goal: Verbalizes/displays adequate comfort level or baseline comfort level  9/3/2024 0906 by Marly Ledbetter RN  Outcome: Progressing  9/3/2024 0906 by Marly Ledbetter RN  Outcome: Progressing  9/2/2024 2124 by Elma Brizuela RN  Outcome: Progressing     Problem: Skin/Tissue Integrity  Goal: Absence of new skin breakdown  Description: 1.  Monitor for areas of redness and/or skin breakdown  2.  Assess vascular access sites hourly  3.  Every 4-6 hours minimum:  Change oxygen saturation probe site  4.  Every 4-6 hours:  If on nasal continuous positive airway pressure, respiratory therapy assess nares and determine need for appliance change or resting period.  9/3/2024 0906 by Marly Ledbetter RN  Outcome: Progressing  9/3/2024 0906 by Marly Ledbetter RN  Outcome: Progressing  9/2/2024 2124 by Elma Brizuela RN  Outcome: Progressing

## 2024-09-03 NOTE — CARE COORDINATION
Chart reviewed.  Noted that pt wants to apply for Medicaid and wants to go to a SNF w/Hospice that accepts Medicaid pending per CM note from 09/02.  Referral to Financial Counselor/Tess via confidential VM.  TE

## 2024-09-03 NOTE — PROGRESS NOTES
TRANSFER - OUT REPORT:    Verbal report given to Marly on Luz Ordoñez being transferred to  for routine post-op       Report consisted of patient's Situation, Background, Assessment and   Recommendations(SBAR).     Information from the following report(s) Nurse Handoff Report was reviewed with the receiving nurse.    Opportunity for questions and clarification was provided.      Patient transported with:   Registered Nurse

## 2024-09-03 NOTE — PROGRESS NOTES
V2.0    [facility] Progress Note      Name:  Luz Ordoñez /Age/Sex: 1940  (84 y.o. female)   MRN & CSN:  [id number] & [id number] Encounter Date/Time: 9/3/2024 8:13 AM EDT   Location:  -A PCP: Kayla Redding PA     Attending:Pau Ewing*       Hospital Day: 7    Assessment and Recommendations   Luz Ordoñez is a 84 y.o. female with pmh of metastatic cholangiocarcinoma, COPD/chronic respiratory failure 2 L NC O2, paroxysmal atrial fibrillation, hypertension, hyperlipidemia, peripheral artery disease  who presents with Acute on chronic respiratory failure (HCC)      Plan:   Acute on chronic respiratory failure with hypoxia  2/2 bilateral pleural effusions, ?malignant effusion  Presented with worsening shortness of breath,  --Chest x-ray showed bilateral pleural effusions L>R  --Supplemental O2 to maintain saturation above 88%, thoracentesis done on , pleural fluid analysis with cytology sent to path  --Repeat chest x-rays worsening moderate left pleural effusion and lung airspace disease.   --IR to place a Pleurx catheter today  --HemOnc on board, appreciate recs     JUANCARLOS suspected prerenal - resolved    Metastatic cholangiocarcinoma  Cancer-related pain  --CT abdomen and pelvis from 2024 reviewed trace bilateral pleural effusions ill-defined multifocal mass occupying right lobe of the liver, satellite lesions are present, right portal vein occlusion is noted.  --Optimize pain regimen  --HemOnc on board  --Given extent of disease and poor performance status candidate for hospice evaluation, CM working to place her as skilled to nursing facility with follow up on hospice as OP     Chronic thrombocytopenia  Probably related to underlying malignancy  --Monitor CBC     COPD  Not in acute exacerbation  --Continue Symbicort and Spiriva as needed DuoNeb     Paroxysmal A-fib  Continue home amiodarone, not on AC    Patient's daughter raised concern about patient's code status  COMPARISON: Portable chest 8/28/2024 TECHNIQUE: Informed consent was obtained from the patient prior to the procedure.. The patient's left thorax was prepped and draped in the usual sterile fashion. Ultrasound imaging reveals a large left pleural effusion. A puncture site was thus chosen. The puncture site was prepped and draped in usual sterile fashion. 1% lidocaine was used to locally anesthetize skin and soft tissues. A 5 Guamanian one-step sheath needle was advanced under real-time sonographic guidance into the effusion. Thoracentesis was then performed. The patient tolerated the entire procedure well without immediate complications. . RADIATION DOSE: None CONTRAST: None FINDINGS: 1 sonographic image reveals a large left pleural effusion with the catheter tip within the left pleural effusion. 1500 cc of clear yellow fluid was removed. The fluid was sent to the lab for analysis      Successful ultrasound-guided left thoracentesis. Electronically signed by Alexia Shepherd    XR CHEST PORTABLE    Result Date: 8/29/2024  INDICATION: s/p left thoracentesis. Follow-up pleural effusion. COMPARISON: August 28, 2024 PROCEDURE: Portable chest xray FINDINGS: The heart is within normal limits for size. Marked decrease left pleural effusion with mild residual. Mild right and mild to moderate left lower lobe atelectasis versus infiltrate, likely atelectasis. There is no evidence of pneumothorax.     1. Marked decreased left pleural effusion status post thoracentesis. 2. Mild right and mild to moderate left lower lobe atelectasis versus infiltrate. Likely atelectasis. Electronically signed by Tyson Elias MD    XR CHEST PORTABLE    Result Date: 8/28/2024  Chest X-ray INDICATION: Chest pain COMPARISON: 3/1/2022 TECHNIQUE: AP/PA view of the chest was obtained. FINDINGS: Large left pleural effusion and small right pleural effusion with bibasilar atelectasis..  The heart size is normal.  The bony thorax is intact.      Large left pleural

## 2024-09-04 LAB
ANION GAP SERPL CALCULATED.3IONS-SCNC: 8 MMOL/L (ref 7–16)
BUN SERPL-MCNC: 17 MG/DL (ref 6–23)
CALCIUM SERPL-MCNC: 8.4 MG/DL (ref 8.3–10.6)
CHLORIDE BLD-SCNC: 98 MMOL/L (ref 99–110)
CO2: 30 MMOL/L (ref 21–32)
CREAT SERPL-MCNC: 1 MG/DL (ref 0.6–1.1)
GFR, ESTIMATED: 56 ML/MIN/1.73M2
GLUCOSE SERPL-MCNC: 120 MG/DL (ref 70–99)
HCT VFR BLD CALC: 43.8 % (ref 37–47)
HCT VFR BLD CALC: 44 % (ref 37–47)
HEMOGLOBIN: 13.9 GM/DL (ref 12.5–16)
HEMOGLOBIN: 13.9 GM/DL (ref 12.5–16)
MCH RBC QN AUTO: 30.6 PG (ref 27–31)
MCH RBC QN AUTO: 30.9 PG (ref 27–31)
MCHC RBC AUTO-ENTMCNC: 31.6 % (ref 32–36)
MCHC RBC AUTO-ENTMCNC: 31.7 % (ref 32–36)
MCV RBC AUTO: 96.9 FL (ref 78–100)
MCV RBC AUTO: 97.3 FL (ref 78–100)
PDW BLD-RTO: 14.2 % (ref 11.7–14.9)
PDW BLD-RTO: 14.2 % (ref 11.7–14.9)
PLATELET # BLD: 102 K/CU MM (ref 140–440)
PLATELET # BLD: 108 K/CU MM (ref 140–440)
PMV BLD AUTO: 9.7 FL (ref 7.5–11.1)
PMV BLD AUTO: 9.9 FL (ref 7.5–11.1)
POTASSIUM SERPL-SCNC: 4.6 MMOL/L (ref 3.5–5.1)
RBC # BLD: 4.5 M/CU MM (ref 4.2–5.4)
RBC # BLD: 4.54 M/CU MM (ref 4.2–5.4)
SODIUM BLD-SCNC: 136 MMOL/L (ref 135–145)
WBC # BLD: 18.6 K/CU MM (ref 4–10.5)
WBC # BLD: 20.6 K/CU MM (ref 4–10.5)

## 2024-09-04 PROCEDURE — 6370000000 HC RX 637 (ALT 250 FOR IP): Performed by: STUDENT IN AN ORGANIZED HEALTH CARE EDUCATION/TRAINING PROGRAM

## 2024-09-04 PROCEDURE — 2140000000 HC CCU INTERMEDIATE R&B

## 2024-09-04 PROCEDURE — 94640 AIRWAY INHALATION TREATMENT: CPT

## 2024-09-04 PROCEDURE — 6370000000 HC RX 637 (ALT 250 FOR IP): Performed by: NURSE PRACTITIONER

## 2024-09-04 PROCEDURE — 2700000000 HC OXYGEN THERAPY PER DAY

## 2024-09-04 PROCEDURE — 94761 N-INVAS EAR/PLS OXIMETRY MLT: CPT

## 2024-09-04 PROCEDURE — 80048 BASIC METABOLIC PNL TOTAL CA: CPT

## 2024-09-04 PROCEDURE — 99232 SBSQ HOSP IP/OBS MODERATE 35: CPT | Performed by: PHYSICIAN ASSISTANT

## 2024-09-04 PROCEDURE — 36415 COLL VENOUS BLD VENIPUNCTURE: CPT

## 2024-09-04 PROCEDURE — 51798 US URINE CAPACITY MEASURE: CPT

## 2024-09-04 PROCEDURE — 85027 COMPLETE CBC AUTOMATED: CPT

## 2024-09-04 PROCEDURE — 2580000003 HC RX 258: Performed by: STUDENT IN AN ORGANIZED HEALTH CARE EDUCATION/TRAINING PROGRAM

## 2024-09-04 RX ORDER — SODIUM CHLORIDE 9 MG/ML
INJECTION, SOLUTION INTRAVENOUS CONTINUOUS
Status: DISCONTINUED | OUTPATIENT
Start: 2024-09-04 | End: 2024-09-06 | Stop reason: HOSPADM

## 2024-09-04 RX ADMIN — CALCIUM CARBONATE 500 MG: 500 TABLET, CHEWABLE ORAL at 21:40

## 2024-09-04 RX ADMIN — OXYCODONE HYDROCHLORIDE 2.5 MG: 5 TABLET ORAL at 00:55

## 2024-09-04 RX ADMIN — HYDROXYZINE HYDROCHLORIDE 25 MG: 25 TABLET ORAL at 11:44

## 2024-09-04 RX ADMIN — AMLODIPINE BESYLATE 5 MG: 5 TABLET ORAL at 09:01

## 2024-09-04 RX ADMIN — OXYCODONE HYDROCHLORIDE 2.5 MG: 5 TABLET ORAL at 09:01

## 2024-09-04 RX ADMIN — ASPIRIN 81 MG: 81 TABLET, CHEWABLE ORAL at 09:01

## 2024-09-04 RX ADMIN — IPRATROPIUM BROMIDE AND ALBUTEROL SULFATE 1 DOSE: 2.5; .5 SOLUTION RESPIRATORY (INHALATION) at 15:33

## 2024-09-04 RX ADMIN — LACTULOSE 20 G: 10 SOLUTION ORAL at 09:02

## 2024-09-04 RX ADMIN — AMIODARONE HYDROCHLORIDE 200 MG: 200 TABLET ORAL at 09:01

## 2024-09-04 RX ADMIN — SENNOSIDES AND DOCUSATE SODIUM 1 TABLET: 50; 8.6 TABLET ORAL at 09:01

## 2024-09-04 RX ADMIN — POLYETHYLENE GLYCOL (3350) 17 G: 17 POWDER, FOR SOLUTION ORAL at 09:01

## 2024-09-04 RX ADMIN — LACTULOSE 20 G: 10 SOLUTION ORAL at 21:40

## 2024-09-04 RX ADMIN — OXYCODONE HYDROCHLORIDE 2.5 MG: 5 TABLET ORAL at 21:40

## 2024-09-04 RX ADMIN — IPRATROPIUM BROMIDE AND ALBUTEROL SULFATE 1 DOSE: 2.5; .5 SOLUTION RESPIRATORY (INHALATION) at 19:44

## 2024-09-04 RX ADMIN — LACTULOSE 20 G: 10 SOLUTION ORAL at 16:25

## 2024-09-04 RX ADMIN — SODIUM CHLORIDE: 9 INJECTION, SOLUTION INTRAVENOUS at 16:25

## 2024-09-04 ASSESSMENT — PAIN DESCRIPTION - ONSET
ONSET: ON-GOING

## 2024-09-04 ASSESSMENT — PAIN DESCRIPTION - DESCRIPTORS
DESCRIPTORS: ACHING;HYPERSENSITIVITY
DESCRIPTORS: ACHING
DESCRIPTORS: ACHING;CRAMPING
DESCRIPTORS: CRAMPING;DISCOMFORT;ACHING

## 2024-09-04 ASSESSMENT — PAIN DESCRIPTION - PAIN TYPE
TYPE: CHRONIC PAIN
TYPE: ACUTE PAIN;SURGICAL PAIN

## 2024-09-04 ASSESSMENT — PAIN SCALES - GENERAL
PAINLEVEL_OUTOF10: 7
PAINLEVEL_OUTOF10: 9
PAINLEVEL_OUTOF10: 7
PAINLEVEL_OUTOF10: 0
PAINLEVEL_OUTOF10: 6
PAINLEVEL_OUTOF10: 7

## 2024-09-04 ASSESSMENT — PAIN DESCRIPTION - ORIENTATION
ORIENTATION: UPPER
ORIENTATION: LEFT

## 2024-09-04 ASSESSMENT — PAIN DESCRIPTION - LOCATION
LOCATION: ABDOMEN
LOCATION: INCISION;RIB CAGE

## 2024-09-04 ASSESSMENT — PAIN DESCRIPTION - FREQUENCY
FREQUENCY: INTERMITTENT
FREQUENCY: INTERMITTENT

## 2024-09-04 ASSESSMENT — PAIN - FUNCTIONAL ASSESSMENT: PAIN_FUNCTIONAL_ASSESSMENT: ACTIVITIES ARE NOT PREVENTED

## 2024-09-04 NOTE — CARE COORDINATION
[facility] tomorrow to skilled if medically ready.  No pre-cert required.  Notified Dr Ewing via PS. Updated pt and daughter/Estella at bedside.  D/C INSTRUCTIONS ARE ON FRONT OF PACKET LOCATED WITH THE SOFT CHART.  Requested 3 drainage container from [facility].  TE    WILL NEED TO SEND 3 PLEURX DRAINAGE CONTAINERS WITH PT.      .Discharging Nurse; Upon discharge pt will be going to [facility], report can be called to [phone], AVS can be faxed to [phone], set up transport via [facility] [phone]  3-Pleurx drainage containers placed in pt's room to go with pt when discharged.  TE

## 2024-09-04 NOTE — PROGRESS NOTES
Hematology Oncology Inpatient Progress Note    Patient Name:  Luz Ordoñez  Patient :  1940  Patient MRN:  [id number]  Room: [address] [place], [place] [address]   Admitted: 2024 10:44 PM   Hospital Attending Provider: Zack Culver MD    PCP:  Kayla Redding PA     Date of Service: 24       Reason for Consult: cholangiocarcinoma     Chief Complaint:    Chief Complaint   Patient presents with    Shortness of Breath    Cough     Pt having Shortness of Breath, cough is on hospice. Pt called them said to come to ER. Pt has antibiotics called in unable to get them.     Principal Problem:    Acute on chronic respiratory failure (HCC)  Active Problems:    Pleural effusion  Resolved Problems:    * No resolved hospital problems. *      HPI:   Luz Ordoñez is a 84 y.o. female with a history of COPD on 2L oxygen at baseline who presented to [facility] complaining of SOB. She was diagnosed with  stage IV intrahepatic cholangiocarcinoma in May 2024 with diffuse osseous metastatic disease at time of diagnosis. Received palliative radiation therapy to the right liver mass 3750 cGy in 15 fractions completed 2024. Dr Franco at [facility] discussed systemic treatment with her and family and her concerns about tolerance given poor performance status and recommended hospice evaluation. It does not appear any NGS was requested. She enrolled in hospice and has since moved to [place] to live with her daughter. She had worsening shortness of breath that came on within a few days and hospice nurse was unable to provide adequate symptom relief and so presented to the ED.  She was found to have large L pleural effusion, small R pleural effusion. She is planned to have thoracentesis today, fluid studies and cytology are ordered. She is requiring 6L oxygen currently.      On exam she is alert and oriented, in no acute distress. States she is SOB worse with any exertion or taking a deep breath. She has

## 2024-09-04 NOTE — PLAN OF CARE
Problem: Discharge Planning  Goal: Discharge to home or other facility with appropriate resources  9/3/2024 2101 by Elma Brizuela RN  Outcome: Progressing  9/3/2024 0906 by Marly Ledbetter RN  Outcome: Progressing  9/3/2024 0906 by Marly Ledbetter RN  Outcome: Progressing     Problem: Safety - Adult  Goal: Free from fall injury  9/3/2024 2101 by Elma Brizuela RN  Outcome: Progressing  9/3/2024 0906 by Marly Ledbetter RN  Outcome: Progressing  9/3/2024 0906 by Marly Ledbetter RN  Outcome: Progressing     Problem: Pain  Goal: Verbalizes/displays adequate comfort level or baseline comfort level  9/3/2024 2101 by Elma Brizuela RN  Outcome: Progressing  9/3/2024 0906 by Marly Ledbetter RN  Outcome: Progressing  9/3/2024 0906 by Marly Ledbetter RN  Outcome: Progressing     Problem: Skin/Tissue Integrity  Goal: Absence of new skin breakdown  Description: 1.  Monitor for areas of redness and/or skin breakdown  2.  Assess vascular access sites hourly  3.  Every 4-6 hours minimum:  Change oxygen saturation probe site  4.  Every 4-6 hours:  If on nasal continuous positive airway pressure, respiratory therapy assess nares and determine need for appliance change or resting period.  9/3/2024 2101 by Elma Brizuela RN  Outcome: Progressing  9/3/2024 0906 by Marly Ledbetter RN  Outcome: Progressing  9/3/2024 0906 by Marly Ledbetter RN  Outcome: Progressing

## 2024-09-04 NOTE — PROGRESS NOTES
V2.0    [facility] Progress Note      Name:  Luz Ordoñez /Age/Sex: 1940  (84 y.o. female)   MRN & CSN:  [id number] & [id number] Encounter Date/Time: 2024 8:13 AM EDT   Location:  [facility]-A PCP: Kayla Redding PA     Attending:Pau Ewing*       Hospital Day: 8    Assessment and Recommendations   Luz Ordoñez is a 84 y.o. female with pmh of metastatic cholangiocarcinoma, COPD/chronic respiratory failure 2 L NC O2, paroxysmal atrial fibrillation, hypertension, hyperlipidemia, peripheral artery disease  who presents with Acute on chronic respiratory failure (HCC)      Plan:   Acute on chronic respiratory failure with hypoxia  2/2 bilateral pleural effusions, ?malignant effusion  Presented with worsening shortness of breath,  --Chest x-ray showed bilateral pleural effusions L>R  --Supplemental O2 to maintain saturation above 88%, thoracentesis done on , pleural fluid analysis with cytology sent to path  --Repeat chest x-rays worsening moderate left pleural effusion and lung airspace disease.   --IR placed Pleurx catheter 9/3/24 and 800cc of bloody fluid was drained and sent for cytology. Management of drain given by aramis SANDERS to nursing staff.  --HemOnc on board, appreciate recs    Leukocytosis  Likely reactive in the setting of newly placed pleurx cath.   --Vitals otherwise stable  --Continue to monitor     JUANCARLOS suspected prerenal - resolved    Metastatic cholangiocarcinoma  Cancer-related pain  --CT abdomen and pelvis from 2024 reviewed trace bilateral pleural effusions ill-defined multifocal mass occupying right lobe of the liver, satellite lesions are present, right portal vein occlusion is noted.  --Optimize pain regimen  --HemOnc on board  --Given extent of disease and poor performance status candidate for hospice evaluation, CM working to place her as skilled to nursing facility with follow up on hospice as OP. Plan to Dc once medically cleared     Chronic  \"BC\"  No results found for: \"BLOODCULT2\"  Organism: No results found for: \"ORG\"      Electronically signed by Pau Ewing MD on 9/4/2024 at 2:46 PM

## 2024-09-04 NOTE — PLAN OF CARE
Pt has family at bedside that is helping with feeding. Pt states they are in too much pain currently to get up to chair, but wants to try some time soon.

## 2024-09-04 NOTE — PROGRESS NOTES
Occupational Therapy  OT attempted to see pt this AM. Pt semi fowlers in bed and deferring therapy at this time, stating she is too fatigued and that \"I just took that pain med\". Provided education to pt on the importance of therapy for functional wellbeing and discharge planning, pt did not respond. Pt left in care of family and nursing student. Discussed w/ CM Ayesha. Will reattempt as able.     Ana Williamson OTR/L [id number]  9/4/2024     11:42 AM

## 2024-09-04 NOTE — CARE COORDINATION
[facility] cannot accept pt d/t the Pleurx cath per Lindsey/ANGELES.  VM left for [facility] to notify CM of decision to accept or not.  TE

## 2024-09-04 NOTE — CARE COORDINATION
The LTC application for pt has been submitted and the conf# is  [id number] per Financial Counselor/Tess.  Notified [facility] via confidential VM.  Pt is going to [facility] and will transition to LTC with Hospice once Medicaid approved.  KENROY

## 2024-09-05 ENCOUNTER — APPOINTMENT (OUTPATIENT)
Dept: GENERAL RADIOLOGY | Age: 84
End: 2024-09-05
Payer: MEDICARE

## 2024-09-05 ENCOUNTER — APPOINTMENT (OUTPATIENT)
Dept: GENERAL RADIOLOGY | Age: 84
End: 2024-09-05
Attending: STUDENT IN AN ORGANIZED HEALTH CARE EDUCATION/TRAINING PROGRAM
Payer: MEDICARE

## 2024-09-05 LAB
HCT VFR BLD CALC: 41.7 % (ref 37–47)
HEMOGLOBIN: 13.2 GM/DL (ref 12.5–16)
LACTATE: 1 MMOL/L (ref 0.5–1.9)
MCH RBC QN AUTO: 31 PG (ref 27–31)
MCHC RBC AUTO-ENTMCNC: 31.7 % (ref 32–36)
MCV RBC AUTO: 97.9 FL (ref 78–100)
PDW BLD-RTO: 14.2 % (ref 11.7–14.9)
PLATELET # BLD: 109 K/CU MM (ref 140–440)
PMV BLD AUTO: 10.2 FL (ref 7.5–11.1)
RBC # BLD: 4.26 M/CU MM (ref 4.2–5.4)
WBC # BLD: 23.1 K/CU MM (ref 4–10.5)

## 2024-09-05 PROCEDURE — 6370000000 HC RX 637 (ALT 250 FOR IP): Performed by: STUDENT IN AN ORGANIZED HEALTH CARE EDUCATION/TRAINING PROGRAM

## 2024-09-05 PROCEDURE — 85027 COMPLETE CBC AUTOMATED: CPT

## 2024-09-05 PROCEDURE — 6370000000 HC RX 637 (ALT 250 FOR IP): Performed by: NURSE PRACTITIONER

## 2024-09-05 PROCEDURE — 99223 1ST HOSP IP/OBS HIGH 75: CPT | Performed by: INTERNAL MEDICINE

## 2024-09-05 PROCEDURE — 99232 SBSQ HOSP IP/OBS MODERATE 35: CPT | Performed by: INTERNAL MEDICINE

## 2024-09-05 PROCEDURE — 74018 RADEX ABDOMEN 1 VIEW: CPT

## 2024-09-05 PROCEDURE — 94761 N-INVAS EAR/PLS OXIMETRY MLT: CPT

## 2024-09-05 PROCEDURE — 87150 DNA/RNA AMPLIFIED PROBE: CPT

## 2024-09-05 PROCEDURE — 71045 X-RAY EXAM CHEST 1 VIEW: CPT

## 2024-09-05 PROCEDURE — 2700000000 HC OXYGEN THERAPY PER DAY

## 2024-09-05 PROCEDURE — 2140000000 HC CCU INTERMEDIATE R&B

## 2024-09-05 PROCEDURE — 83605 ASSAY OF LACTIC ACID: CPT

## 2024-09-05 PROCEDURE — 6360000002 HC RX W HCPCS: Performed by: STUDENT IN AN ORGANIZED HEALTH CARE EDUCATION/TRAINING PROGRAM

## 2024-09-05 PROCEDURE — 87040 BLOOD CULTURE FOR BACTERIA: CPT

## 2024-09-05 PROCEDURE — 94640 AIRWAY INHALATION TREATMENT: CPT

## 2024-09-05 PROCEDURE — 36415 COLL VENOUS BLD VENIPUNCTURE: CPT

## 2024-09-05 RX ORDER — OXYCODONE HYDROCHLORIDE 5 MG/1
5 TABLET ORAL EVERY 6 HOURS PRN
Status: DISCONTINUED | OUTPATIENT
Start: 2024-09-05 | End: 2024-09-06 | Stop reason: HOSPADM

## 2024-09-05 RX ORDER — ALBUTEROL SULFATE 0.83 MG/ML
2.5 SOLUTION RESPIRATORY (INHALATION)
Status: DISCONTINUED | OUTPATIENT
Start: 2024-09-05 | End: 2024-09-06 | Stop reason: HOSPADM

## 2024-09-05 RX ADMIN — AMIODARONE HYDROCHLORIDE 200 MG: 200 TABLET ORAL at 08:32

## 2024-09-05 RX ADMIN — HYDROXYZINE HYDROCHLORIDE 25 MG: 25 TABLET ORAL at 21:31

## 2024-09-05 RX ADMIN — IPRATROPIUM BROMIDE AND ALBUTEROL SULFATE 1 DOSE: 2.5; .5 SOLUTION RESPIRATORY (INHALATION) at 08:23

## 2024-09-05 RX ADMIN — ASPIRIN 81 MG: 81 TABLET, CHEWABLE ORAL at 08:32

## 2024-09-05 RX ADMIN — LACTULOSE 20 G: 10 SOLUTION ORAL at 15:08

## 2024-09-05 RX ADMIN — IPRATROPIUM BROMIDE AND ALBUTEROL SULFATE 1 DOSE: 2.5; .5 SOLUTION RESPIRATORY (INHALATION) at 17:48

## 2024-09-05 RX ADMIN — ALBUTEROL SULFATE 2.5 MG: 2.5 SOLUTION RESPIRATORY (INHALATION) at 20:06

## 2024-09-05 RX ADMIN — POLYETHYLENE GLYCOL (3350) 17 G: 17 POWDER, FOR SOLUTION ORAL at 08:33

## 2024-09-05 RX ADMIN — OXYCODONE HYDROCHLORIDE 5 MG: 5 TABLET ORAL at 04:41

## 2024-09-05 RX ADMIN — AMLODIPINE BESYLATE 5 MG: 5 TABLET ORAL at 08:32

## 2024-09-05 RX ADMIN — LACTULOSE 20 G: 10 SOLUTION ORAL at 08:32

## 2024-09-05 RX ADMIN — HYDROXYZINE HYDROCHLORIDE 25 MG: 25 TABLET ORAL at 01:06

## 2024-09-05 RX ADMIN — SENNOSIDES AND DOCUSATE SODIUM 1 TABLET: 50; 8.6 TABLET ORAL at 08:33

## 2024-09-05 ASSESSMENT — PAIN SCALES - GENERAL
PAINLEVEL_OUTOF10: 9
PAINLEVEL_OUTOF10: 0

## 2024-09-05 ASSESSMENT — PAIN - FUNCTIONAL ASSESSMENT: PAIN_FUNCTIONAL_ASSESSMENT: ACTIVITIES ARE NOT PREVENTED

## 2024-09-05 ASSESSMENT — PAIN DESCRIPTION - DESCRIPTORS: DESCRIPTORS: ACHING

## 2024-09-05 ASSESSMENT — PAIN SCALES - WONG BAKER: WONGBAKER_NUMERICALRESPONSE: NO HURT

## 2024-09-05 ASSESSMENT — PAIN DESCRIPTION - LOCATION: LOCATION: ABDOMEN

## 2024-09-05 NOTE — CONSULTS
Infectious Disease Consult Note  2024   Patient Name: Luz Ordoñez : 1940     Assessment  Leukocytosis  Stage IV intrahepatic cholangiocarcinoma with osseous metastasis admitted for admitted for abdominal pain and shortness of breath.  Diagnosed with bilateral pleural effusion, greater on the left than on the right.  Thoracentesis has been performed twice and the fluid is exudative. Left Pleurx catheter is in place.   Noted to have rising leukocytosis. It could be non-infectious: cancer or infectious: pneumonia. Tests will be ordered.   Comorbid conditions: Metastatic cholangiocarcinoma, COPD, chronic hypoxic respiratory failure (baseline 2 L/min of oxygen), paroxysmal atrial fibrillation, hypertension, hyperlipidemia, peripheral arterial disease    Plan  Therapeutic:  Hold off on abx  Diagnostic:  Trend CRP and pct  UA  F/u:  Blood cx  Respiratory cx  Other:     Thank you for allowing me to consult in the care of this patient.  ------------------------  REASON FOR CONSULT: \"Concern for occult infection\"  Requested by: Pau Ewing MD  HPI:Patient is a 84 y.o. female living with metastatic cholangiocarcinoma, COPD, chronic hypoxic respiratory failure, paroxysmal atrial fibrillation who was admitted 2024 for further evaluation and management of shortness of breath.  She was initially seen at ER in [place].  She was diagnosed with stage IV intrahepatic cholangiocarcinoma with diffuse osseous metastasis in May 2024.  She received 15 fractions of radiation therapy that was completed on 2024.  Systemic treatment was discussed but her poor performance status and probable intolerance to chemotherapy was a concern. Hence, she opted for hospice care.  She moved from [place] to [place] to be with her daughter.  She was in the hospice program here in [place], and had called them because of her symptoms of pain and shortness of breath.  Medications he provided did not relieve her symptoms and  she elected to come to the hospital.  Chest x-ray showed bilateral pleural effusion with the left larger than the right.  She underwent a CT-guided thoracentesis on 9/3/2024.  800 cc of bloody fluid was removed.  White blood cell count 412, neutrophil 26%, RBC 87,000, , glucose 121.  The fluid is exudative.  CBC has shown an increase in her WBC.  There is no fever. She endorsed LLQ pain but neies dysuria.    ?  Infectious diseases service was consulted to evaluate the pt, and recommend further investigative and therapeutic measures.  Review and summary of old records:  ROS: Other systems reviewed Including eyes, ENT, respiratory, cardiovascular, GI, , dermatologic, neurologic, psych, hem/lymphatic, musculoskeletal and endocrine were negative other than what is mentioned above.   Patient Active Problem List    Diagnosis Date Noted    Acute on chronic respiratory failure (HCC) 08/29/2024    Pleural effusion 08/29/2024    Subclavian steal syndrome 12/25/2023    TIA (transient ischemic attack) 12/23/2023    COPD exacerbation ([facility]) 09/02/2019    PVD (peripheral vascular disease) with claudication ([facility]) 04/24/2019     Past Medical History:   Diagnosis Date    COPD (chronic obstructive pulmonary disease) ([facility])       History reviewed. No pertinent surgical history.   History reviewed. No pertinent family history.   Infectious disease related family history - not contibutory.   SOCIAL HISTORY  Social History     Tobacco Use    Smoking status: Some Days     Current packs/day: 0.50     Average packs/day: 0.5 packs/day for 40.0 years (20.0 ttl pk-yrs)     Types: Cigarettes     Start date: 8/30/1984    Smokeless tobacco: Never   Substance Use Topics    Alcohol use: Never      Ancestry: White   Born: [place]  Lived: [place], [place]  Occupation:  No recent travel of significance.  No recent unusual exposures.  NO pets    ?  ALLERGIES  No Known Allergies   MEDICATIONS  Reviewed and are

## 2024-09-05 NOTE — PROGRESS NOTES
09/05/24 1356   Encounter Summary   Encounter Overview/Reason Follow-up   Service Provided For Patient and family together   Referral/Consult From [facility]   Last Encounter  09/05/24  (Follow up visit. Patient was in good spirit during the visit. Patient did not express any needs/concerns at this time.)   Complexity of Encounter Low   Begin Time 1345   End Time  1400   Total Time Calculated 15 min   Spiritual/Emotional needs   Type Spiritual Support   Grief, Loss, and Adjustments   Type Anticipatory Grief

## 2024-09-05 NOTE — PROGRESS NOTES
V2.0    [facility] Progress Note      Name:  Luz Ordoñez /Age/Sex: 1940  (84 y.o. female)   MRN & CSN:  [id number] & [id number] Encounter Date/Time: 2024 8:13 AM EDT   Location:  [facility]12-A PCP: Kayla Redding PA     Attending:Pau Ewing*       Hospital Day: 9    Assessment and Recommendations   Luz Ordoñez is a 84 y.o. female with pmh of metastatic cholangiocarcinoma, COPD/chronic respiratory failure 2 L NC O2, paroxysmal atrial fibrillation, hypertension, hyperlipidemia, peripheral artery disease  who presents with Acute on chronic respiratory failure (HCC)      Plan:   Acute on chronic respiratory failure with hypoxia  2/2 bilateral pleural effusions, ?malignant effusion  Presented with worsening shortness of breath,  --Chest x-ray showed bilateral pleural effusions L>R  --Supplemental O2 to maintain saturation above 88%, thoracentesis done on , pleural fluid analysis with cytology sent to path  --Repeat chest x-rays worsening moderate left pleural effusion and lung airspace disease.   --IR placed Pleurx catheter 9/3/24 and 800cc of bloody fluid was drained and sent for cytology. Management of drain given by aramis SANDERS to nursing staff.  --HemOnc on board, appreciate recs    SIRS  Patient with increasing leukocytosis, tachycardia and tachypnea  --LA 1, f/u Bcx, UA with reflex, resp culture, strep pna and legionella urinary antigens  --CXR pending  --Continue to monitor     JUANCARLOS suspected prerenal - resolved    Metastatic cholangiocarcinoma  Cancer-related pain  --CT abdomen and pelvis from 2024 reviewed trace bilateral pleural effusions ill-defined multifocal mass occupying right lobe of the liver, satellite lesions are present, right portal vein occlusion is noted.  --Optimize pain regimen  --HemOnc on board  --Given extent of disease and poor performance status candidate for hospice evaluation, CM was planning to have her placed at SNF with longterm goal being  Date: 8/28/2024  Chest X-ray INDICATION: Chest pain COMPARISON: 3/1/2022 TECHNIQUE: AP/PA view of the chest was obtained. FINDINGS: Large left pleural effusion and small right pleural effusion with bibasilar atelectasis..  The heart size is normal.  The bony thorax is intact.      Large left pleural effusion and small right pleural effusion with bibasilar atelectasis. Electronically signed by Wicho Rico      CBC:   Recent Labs     09/04/24  0353 09/04/24  1622 09/05/24  0210   WBC 18.6* 20.6* 23.1*   HGB 13.9 13.9 13.2   * 108* 109*     BMP:    Recent Labs     09/04/24  0353      K 4.6   CL 98*   CO2 30   BUN 17   CREATININE 1.0   GLUCOSE 120*     Hepatic:   No results for input(s): \"AST\", \"ALT\", \"BILITOT\", \"ALKPHOS\" in the last 72 hours.    Invalid input(s): \"ALB\"    Lipids:   Lab Results   Component Value Date/Time    CHOL 163 12/23/2023 04:57 PM    HDL 53 12/23/2023 04:57 PM    TRIG 99 12/23/2023 04:57 PM     Hemoglobin A1C:   Lab Results   Component Value Date/Time    LABA1C 5.9 12/23/2023 04:57 PM     TSH: No results found for: \"TSH\"  Troponin:   Lab Results   Component Value Date/Time    TROPONINT <0.010 09/01/2021 12:53 PM    TROPONINT <0.010 09/02/2019 02:50 AM     Lactic Acid: No results for input(s): \"LACTA\" in the last 72 hours.  BNP: No results for input(s): \"PROBNP\" in the last 72 hours.  UA:  Lab Results   Component Value Date/Time    NITRU NEGATIVE 08/31/2024 01:00 AM    COLORU YELLOW 08/31/2024 01:00 AM    PHUR 6.0 08/31/2024 01:00 AM    WBCUA <1 08/31/2024 01:00 AM    RBCUA 1 08/31/2024 01:00 AM    MUCUS FEW 03/01/2022 06:33 PM    TRICHOMONAS NONE SEEN 08/31/2024 01:00 AM    BACTERIA NEGATIVE 08/31/2024 01:00 AM    CLARITYU CLEAR 08/31/2024 01:00 AM    LEUKOCYTESUR NEGATIVE 08/31/2024 01:00 AM    UROBILINOGEN 1.0 08/31/2024 01:00 AM    BILIRUBINUR NEGATIVE 08/31/2024 01:00 AM    BLOODU NEGATIVE 08/31/2024 01:00 AM    GLUCOSEU 100 08/31/2024 01:00 AM    KETUA NEGATIVE 08/31/2024

## 2024-09-05 NOTE — PROGRESS NOTES
Hematology Oncology Inpatient Progress Note    Patient Name:  Luz Ordoñez  Patient :  1940  Patient MRN:  [id number]  Room: [address] [place], [place] [address]   Admitted: 2024 10:44 PM   Hospital Attending Provider: Zack Culver MD    PCP:  Kayla Redding PA     Date of Service: 24       Reason for Consult: cholangiocarcinoma     Chief Complaint:    Chief Complaint   Patient presents with    Shortness of Breath    Cough     Pt having Shortness of Breath, cough is on hospice. Pt called them said to come to ER. Pt has antibiotics called in unable to get them.     Principal Problem:    Acute on chronic respiratory failure (HCC)  Active Problems:    Pleural effusion  Resolved Problems:    * No resolved hospital problems. *      HPI:   Luz Ordoñez is a 84 y.o. female with a history of COPD on 2L oxygen at baseline who presented to [facility] complaining of SOB. She was diagnosed with  stage IV intrahepatic cholangiocarcinoma in May 2024 with diffuse osseous metastatic disease at time of diagnosis. Received palliative radiation therapy to the right liver mass 3750 cGy in 15 fractions completed 2024. Dr Franco at [facility] discussed systemic treatment with her and family and her concerns about tolerance given poor performance status and recommended hospice evaluation. It does not appear any NGS was requested. She enrolled in hospice and has since moved to [place] to live with her daughter. She had worsening shortness of breath that came on within a few days and hospice nurse was unable to provide adequate symptom relief and so presented to the ED.  She was found to have large L pleural effusion, small R pleural effusion. She is planned to have thoracentesis today, fluid studies and cytology are ordered. She is requiring 6L oxygen currently.      On exam she is alert and oriented, in no acute distress. States she is SOB worse with any exertion or taking a deep breath. She has  09/05/2024    MCHC 31.7 (L) 09/05/2024    RDW 14.2 09/05/2024           Lab Results   Component Value Date    INR 0.9 12/23/2023    PROTIME 13.1 12/23/2023     Lab Results   Component Value Date     09/04/2024    K 4.6 09/04/2024    CL 98 (L) 09/04/2024    CO2 30 09/04/2024    BUN 17 09/04/2024    CREATININE 1.0 09/04/2024    GLUCOSE 120 (H) 09/04/2024    CALCIUM 8.4 09/04/2024    BILITOT 0.6 09/01/2024    ALKPHOS 291 (H) 09/01/2024    AST 22 09/01/2024    ALT 22 09/01/2024    LABGLOM 56 (L) 09/04/2024    GFRAA >60 03/01/2022    POCGLU 114 (H) 08/29/2024     Lab Results   Component Value Date    ALKPHOS 291 (H) 09/01/2024    ALT 22 09/01/2024    AST 22 09/01/2024    BILITOT 0.6 09/01/2024     No results found for: \"URICACID\"  No results found for: \"LDH\"  No results found for: \"IRON\", \"TIBC\", \"FERRITIN\"    Imaging:  XR ABDOMEN (KUB) (SINGLE AP VIEW)   Final Result   1. As above.      Electronically signed by Viviana Lauren      CT THORACENTESIS W TUBE   Final Result   Successful placement of Pleurx cath on the left side as described   above.         Electronically signed by Jade Black      XR CHEST PORTABLE   Final Result   1. Increased bilateral lung airspace disease and pleural effusion      Electronically signed by Viviana Lauren      XR CHEST PORTABLE   Final Result   Findings/impression: Slightly worsening moderate left pleural effusion with   adjacent airspace disease and patchy multifocal airspace opacities in the   bibasilar lung zones. Additional minimal opacities are seen in the biapical lung   fields. No pneumothorax. Cardiomediastinal silhouette is suboptimally evaluated.      Correlate for worsening edema pattern. Superimposed infectious process must be   excluded clinically.      Electronically signed by Earl Vela      XR CHEST PORTABLE   Final Result   1. Marked decreased left pleural effusion status post thoracentesis.   2. Mild right and mild to moderate left lower lobe atelectasis

## 2024-09-05 NOTE — CARE COORDINATION
Spoke with Wagner/CANDICE to see if pt could have Hospice before they have Medicaid approval.  She informed CM that she will have to wait until the Medicaid is approved, which could take a week or two before pt will be able to have Hospice.  CM discussed possible inpt Hospice House in [place] or [place] if pt meets criteria with pt and daughter/Estella at bedside.  CM informed them that [facility] is the only Hospice Agency that has inpt facilities and that they are affiliated with the [facility].  Pt and daughter are agreeable for referral to be made to [facility] to see if pt meets criteria for inpt Hospice House. Notified Dr Ewing of need for new Hospice order.  Referral made to [place]/[facility] and informed her of need for Hospice House if pt meets criteria.  She will reach out to daughter/Estella to arrange a meeting time.  TE

## 2024-09-05 NOTE — PROGRESS NOTES
Occupational Therapy  OT/PT attempted to see pt this PM for follow up. Pt resting in bed with lights off, deferring therapy at this time citing fatigue and recent pain med administration impacting her clarity. Will reattempt to see pt as able.     Ana Williamson OTR/L [id number]  9/5/2024     1:42 PM

## 2024-09-05 NOTE — PROGRESS NOTES
Pleurex cath drained. 400cc of serosanguinous fluid drained from left side. Patient tolerated procedure well, VS stable

## 2024-09-06 VITALS
RESPIRATION RATE: 19 BRPM | BODY MASS INDEX: 29.78 KG/M2 | SYSTOLIC BLOOD PRESSURE: 108 MMHG | OXYGEN SATURATION: 95 % | DIASTOLIC BLOOD PRESSURE: 54 MMHG | WEIGHT: 161.82 LBS | TEMPERATURE: 98.3 F | HEIGHT: 62 IN | HEART RATE: 93 BPM

## 2024-09-06 LAB
ANION GAP SERPL CALCULATED.3IONS-SCNC: 6 MMOL/L (ref 7–16)
BUN SERPL-MCNC: 19 MG/DL (ref 6–23)
CALCIUM SERPL-MCNC: 7.8 MG/DL (ref 8.3–10.6)
CHLORIDE BLD-SCNC: 99 MMOL/L (ref 99–110)
CO2: 31 MMOL/L (ref 21–32)
CREAT SERPL-MCNC: 1.1 MG/DL (ref 0.6–1.1)
GFR, ESTIMATED: 50 ML/MIN/1.73M2
GLUCOSE SERPL-MCNC: 124 MG/DL (ref 70–99)
HCT VFR BLD CALC: 39.3 % (ref 37–47)
HEMOGLOBIN: 12.3 GM/DL (ref 12.5–16)
MCH RBC QN AUTO: 30.9 PG (ref 27–31)
MCHC RBC AUTO-ENTMCNC: 31.3 % (ref 32–36)
MCV RBC AUTO: 98.7 FL (ref 78–100)
PDW BLD-RTO: 14.1 % (ref 11.7–14.9)
PLATELET # BLD: 110 K/CU MM (ref 140–440)
PMV BLD AUTO: 9.6 FL (ref 7.5–11.1)
POTASSIUM SERPL-SCNC: 4.3 MMOL/L (ref 3.5–5.1)
RBC # BLD: 3.98 M/CU MM (ref 4.2–5.4)
SODIUM BLD-SCNC: 136 MMOL/L (ref 135–145)
WBC # BLD: 17.1 K/CU MM (ref 4–10.5)

## 2024-09-06 PROCEDURE — 80048 BASIC METABOLIC PNL TOTAL CA: CPT

## 2024-09-06 PROCEDURE — 6360000002 HC RX W HCPCS: Performed by: INTERNAL MEDICINE

## 2024-09-06 PROCEDURE — 6370000000 HC RX 637 (ALT 250 FOR IP): Performed by: STUDENT IN AN ORGANIZED HEALTH CARE EDUCATION/TRAINING PROGRAM

## 2024-09-06 PROCEDURE — 99231 SBSQ HOSP IP/OBS SF/LOW 25: CPT | Performed by: INTERNAL MEDICINE

## 2024-09-06 PROCEDURE — 85027 COMPLETE CBC AUTOMATED: CPT

## 2024-09-06 PROCEDURE — 6360000002 HC RX W HCPCS: Performed by: STUDENT IN AN ORGANIZED HEALTH CARE EDUCATION/TRAINING PROGRAM

## 2024-09-06 PROCEDURE — 6370000000 HC RX 637 (ALT 250 FOR IP): Performed by: NURSE PRACTITIONER

## 2024-09-06 PROCEDURE — 94640 AIRWAY INHALATION TREATMENT: CPT

## 2024-09-06 PROCEDURE — 2580000003 HC RX 258: Performed by: INTERNAL MEDICINE

## 2024-09-06 PROCEDURE — 36415 COLL VENOUS BLD VENIPUNCTURE: CPT

## 2024-09-06 PROCEDURE — 99232 SBSQ HOSP IP/OBS MODERATE 35: CPT | Performed by: INTERNAL MEDICINE

## 2024-09-06 RX ADMIN — ALBUTEROL SULFATE 2.5 MG: 2.5 SOLUTION RESPIRATORY (INHALATION) at 08:23

## 2024-09-06 RX ADMIN — POLYETHYLENE GLYCOL (3350) 17 G: 17 POWDER, FOR SOLUTION ORAL at 09:03

## 2024-09-06 RX ADMIN — LACTULOSE 20 G: 10 SOLUTION ORAL at 09:07

## 2024-09-06 RX ADMIN — SENNOSIDES AND DOCUSATE SODIUM 1 TABLET: 50; 8.6 TABLET ORAL at 09:03

## 2024-09-06 RX ADMIN — VANCOMYCIN HYDROCHLORIDE 2000 MG: 5 INJECTION, POWDER, LYOPHILIZED, FOR SOLUTION INTRAVENOUS at 11:32

## 2024-09-06 RX ADMIN — OXYCODONE HYDROCHLORIDE 5 MG: 5 TABLET ORAL at 09:05

## 2024-09-06 RX ADMIN — ASPIRIN 81 MG: 81 TABLET, CHEWABLE ORAL at 09:03

## 2024-09-06 RX ADMIN — AMIODARONE HYDROCHLORIDE 200 MG: 200 TABLET ORAL at 09:03

## 2024-09-06 RX ADMIN — ALBUTEROL SULFATE 2.5 MG: 2.5 SOLUTION RESPIRATORY (INHALATION) at 11:56

## 2024-09-06 ASSESSMENT — PAIN SCALES - GENERAL
PAINLEVEL_OUTOF10: 0

## 2024-09-06 ASSESSMENT — PAIN SCALES - WONG BAKER: WONGBAKER_NUMERICALRESPONSE: NO HURT

## 2024-09-06 ASSESSMENT — PAIN DESCRIPTION - LOCATION: LOCATION: ABDOMEN

## 2024-09-06 NOTE — CARE COORDINATION
Chart reviewed. CM spoke with [facility] and the patient has been accepted to [facility] in [place]. CM does not have a time yet. The family, nurse and doctor were updated. CM is following.    1136  Patient is going to be transported by [facility] (hospice transportation) at 1:45pm today.

## 2024-09-06 NOTE — PROGRESS NOTES
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  Diagnosed with bilateral pleural effusion, greater on the left than on the right.  Thoracentesis has been performed twice and the fluid is exudative. Left Pleurx catheter is in place.   Blood cx: 1 of 4 Staphylococcus epidermidis  Typically would call this a contaminant but in the setting of advanced age, metastatic cancer, and left Pleurx tube, treatment is reasonable  Comorbid conditions: Metastatic cholangiocarcinoma, COPD, chronic hypoxic respiratory failure (baseline 2 L/min of oxygen), paroxysmal atrial fibrillation, hypertension, hyperlipidemia, peripheral arterial disease     Plan  Therapeutic:  IV vancomycin (-)  Diagnostic:  Trend CRP and pct  UA  F/u:  Blood cx  Respiratory cx  Other:     Reason for visit: F/u Leukocytosis  History:?Interval history noted  Denies n/v/d/f or untoward effects of antimicrobials  Reports no abdominal pain today  Physical Exam:  Vital Signs: BP (!) 119/55   Pulse 97   Temp 98.5 °F (36.9 °C) (Oral)   Resp 30   Ht 1.575 m (5' 2\")   Wt 73.4 kg (161 lb 13.1 oz)   SpO2 90%   BMI 29.60 kg/m²     Gen: alert and oriented X3, no distress  Skin: no stigmata of endocarditis  Wounds: C/D/I  HEMT: AT/NC Oropharynx pink, moist, and without lesions or exudates; dentition in good state of repair  Eyes: PERRLA, EOMI, conjunctiva pink, sclera anicteric.   Neck: Supple. Trachea midline. No LAD.  Chest: left Pleurx catheter, reduced air entry in lung bases  Heart: RRR and no MRG.   Abd: soft, non-distended, no tenderness, no hepatomegaly. Normoactive bowel sounds.  Ext: no clubbing, cyanosis, or edema  Catheter Site: without erythema or tenderness  LDA:  Neuro: Mental status intact. CN 2-12 intact  signed by Wilner Woodall MD on 9/6/2024 at 8:30 AM

## 2024-09-06 NOTE — PROGRESS NOTES
Nutrition Assessment     Type and Reason for Visit: Initial, RD Nutrition Re-Screen/LOS    Nutrition Recommendations/Plan:   Continue current diet  Start standard supplements     Nutrition Assessment:  Pt assessed due to a length of stay. She has been on a no salt added diet and has not been consumine more than 25% of her meals. Due to pt extensive cancer, pt and the family have agreed to have hospice started. Will order supplements and follow from afar.    Current Nutrition Therapies:    ADULT DIET; Regular; No Added Salt (3-4 gm)    Krista Marcos RD, LD  Contact: [phone]

## 2024-09-06 NOTE — PROGRESS NOTES
Hematology Oncology Inpatient Progress Note    Patient Name:  Luz Ordoñez  Patient :  1940  Patient MRN:  [id number]  Room: [address] [place], [place] [address]A   Admitted: 2024 10:44 PM   Hospital Attending Provider: Zack Culver MD    PCP:  Kayla Redding PA     Date of Service: 24       Reason for Consult: cholangiocarcinoma     Chief Complaint:    Chief Complaint   Patient presents with    Shortness of Breath    Cough     Pt having Shortness of Breath, cough is on hospice. Pt called them said to come to ER. Pt has antibiotics called in unable to get them.     Principal Problem:    Acute on chronic respiratory failure (HCC)  Active Problems:    Pleural effusion  Resolved Problems:    * No resolved hospital problems. *      HPI:   Luz Ordoñez is a 84 y.o. female with a history of COPD on 2L oxygen at baseline who presented to [facility] complaining of SOB. She was diagnosed with  stage IV intrahepatic cholangiocarcinoma in May 2024 with diffuse osseous metastatic disease at time of diagnosis. Received palliative radiation therapy to the right liver mass 3750 cGy in 15 fractions completed 2024. Dr Franco at [facility] discussed systemic treatment with her and family and her concerns about tolerance given poor performance status and recommended hospice evaluation. It does not appear any NGS was requested. She enrolled in hospice and has since moved to [place] to live with her daughter. She had worsening shortness of breath that came on within a few days and hospice nurse was unable to provide adequate symptom relief and so presented to the ED.  She was found to have large L pleural effusion, small R pleural effusion. She is planned to have thoracentesis today, fluid studies and cytology are ordered. She is requiring 6L oxygen currently.      On exam she is alert and oriented, in no acute distress. States she is SOB worse with any exertion or taking a deep breath. She has  Diagnosed in [place], has been in hospice d/t poor PS (ECOG 3) and moved to [place] to live with daughter.  Discussed the pleural fluid results.  Patient and family does not want to proceed with any further systemic treatment.  I agree with the decision and discussed hospice/best supportive care.  Primary team trying to get her to hospice facility    #Large L Pleural Effusion  Metastatic per cytology.  Status post Pleurx catheter placement.  Drain instructions provided    #Cancer Related Pain  Adequate pain management and bowel regimen.      Continue other medical care.    Will continue to follow while she is here    KAREN

## 2024-09-06 NOTE — DISCHARGE SUMMARY
prerenal - resolved     Metastatic cholangiocarcinoma  Cancer-related pain  --CT abdomen and pelvis from June 2024 reviewed trace bilateral pleural effusions ill-defined multifocal mass occupying right lobe of the liver, satellite lesions are present, right portal vein occlusion is noted.  --Optimize pain regimen  --HemOnc on board  --Given extent of disease and poor performance status [facility] and she is at this time set for transfer to their inpatient facility     Chronic thrombocytopenia - stable  Probably related to underlying malignancy  --Monitor CBC     COPD  Not in acute exacerbation  --Continue Symbicort and Spiriva as needed DuoNeb     Paroxysmal A-fib  Continue home amiodarone, not on AC     Constipation  Patient has been on bowel regimen including lactulose  --Will trial fleet enema    Patient was discharged this AM to hospice    The patient expressed appropriate understanding of, and agreement with the discharge recommendations, medications, and plan.     Consults this admission:  IP CONSULT TO SPIRITUAL SERVICES  IP CONSULT TO ONCOLOGY  IP CONSULT TO HOSPICE  IP CONSULT TO INTERVENTIONAL RADIOLOGY  IP CONSULT TO INFECTIOUS DISEASES  IP CONSULT TO HOSPICE  PHARMACY TO DOSE VANCOMYCIN    Discharge Diagnosis:   Acute on chronic respiratory failure (HCC)  Metastatic cholangiocarcinoma  Recurrent malignant effusions    Discharge Instruction:   Follow up appointments:   Primary care physician: Kayla Redding PA within 1 week  Diet: regular diet   Activity: activity as tolerated  Disposition: Discharged to:   []Home, []C, []SNF, []Acute Rehab, [x]Hospice   Condition on discharge: Stable  Labs and Tests to be Followed up as an outpatient by PCP or Specialist:     Discharge Medications:        Medication List        STOP taking these medications      acetaminophen 500 MG tablet  Commonly known as: TYLENOL     albuterol sulfate      NITRU NEGATIVE 08/31/2024 01:00 AM    COLORU YELLOW 08/31/2024 01:00 AM    PHUR 6.0 08/31/2024 01:00 AM    WBCUA <1 08/31/2024 01:00 AM    RBCUA 1 08/31/2024 01:00 AM    MUCUS FEW 03/01/2022 06:33 PM    TRICHOMONAS NONE SEEN 08/31/2024 01:00 AM    BACTERIA NEGATIVE 08/31/2024 01:00 AM    CLARITYU CLEAR 08/31/2024 01:00 AM    LEUKOCYTESUR NEGATIVE 08/31/2024 01:00 AM    UROBILINOGEN 1.0 08/31/2024 01:00 AM    BILIRUBINUR NEGATIVE 08/31/2024 01:00 AM    BLOODU NEGATIVE 08/31/2024 01:00 AM    GLUCOSEU 100 08/31/2024 01:00 AM    KETUA NEGATIVE 08/31/2024 01:00 AM     Urine Cultures: No results found for: \"LABURIN\"  Blood Cultures: No results found for: \"BC\"  No results found for: \"BLOODCULT2\"  Organism: No results found for: \"ORG\"    Time Spent Discharging patient 40 minutes    Electronically signed by Pau Ewing MD on 9/6/2024 at 2:58 PM

## 2024-09-08 LAB
CULTURE: ABNORMAL
CULTURE: ABNORMAL
CULTURE: NORMAL
Lab: ABNORMAL
Lab: NORMAL
SPECIMEN: ABNORMAL
SPECIMEN: NORMAL

## 2024-09-10 LAB
CULTURE: NORMAL
Lab: NORMAL
SPECIMEN: NORMAL

## 2025-04-01 NOTE — PROGRESS NOTES
PHARMACY VANCOMYCIN MONITORING SERVICE  Pharmacy consulted by Dr. Woodall for monitoring and adjustment.    Indication for treatment: Vancomycin indication: Bacteremia  Goal trough: Trough Goal: 10-15 mcg/mL  AUC/ANGELICA: <500    Risk Factors for MRSA Identified:   Hospitalization within the past 90 days, Received IV antibiotics within the past 90 days    Pertinent Laboratory Values:   Temp Readings from Last 3 Encounters:   09/06/24 98.5 °F (36.9 °C) (Oral)   12/27/23 98.3 °F (36.8 °C) (Oral)   09/13/22 97.3 °F (36.3 °C)     Recent Labs     09/04/24  1622 09/05/24  0210 09/05/24  1030 09/06/24  0300   WBC 20.6* 23.1*  --  17.1*   LACTATE  --   --  1.0  --      Recent Labs     09/04/24  0353 09/06/24  0300   BUN 17 19   CREATININE 1.0 1.1     Estimated Creatinine Clearance: 37 mL/min (based on SCr of 1.1 mg/dL).    Intake/Output Summary (Last 24 hours) at 9/6/2024 1044  Last data filed at 9/6/2024 0737  Gross per 24 hour   Intake 120 ml   Output 125 ml   Net -5 ml     Last Encounter Weight:  Wt Readings from Last 3 Encounters:   09/04/24 73.4 kg (161 lb 13.1 oz)   12/27/23 75.5 kg (166 lb 7.2 oz)   09/13/22 70.3 kg (155 lb)       Pertinent Cultures:   Date    Source    Results  9/5   Blood    Staph epi 1/4      Vancomycin level:   TROUGH:  No results for input(s): \"VANCOTROUGH\" in the last 72 hours.  RANDOM:  No results for input(s): \"VANCORANDOM\" in the last 72 hours.    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 Diagnosed with bilateral pleural effusion, greater on the left than on the right. Thoracentesis has been performed twice and the fluid is exudative. Left Pleurx catheter is in place.   SCr, BUN, and urine output: stable  Day(s) of therapy: 1  Vancomycin concentration: TBD    Plan:  Loading dose of vancomycin administered: 2000 mg. Intermittent dosing of vancomycin will be used due to renal function.    Pharmacy will continue to monitor patient and adjust therapy as indicated    VANCOMYCIN CONCENTRATION SCHEDULED FOR 9/7 @0600    Thank you for the consult.  Noe Diego RPH  9/6/2024 10:44 AM     No respiratory distress. No stridor, Lungs sounds clear with good aeration bilaterally.